# Patient Record
Sex: MALE | Race: WHITE | NOT HISPANIC OR LATINO | Employment: PART TIME | URBAN - METROPOLITAN AREA
[De-identification: names, ages, dates, MRNs, and addresses within clinical notes are randomized per-mention and may not be internally consistent; named-entity substitution may affect disease eponyms.]

---

## 2017-04-24 ENCOUNTER — TRANSCRIBE ORDERS (OUTPATIENT)
Dept: ADMINISTRATIVE | Facility: HOSPITAL | Age: 57
End: 2017-04-24

## 2017-04-24 ENCOUNTER — APPOINTMENT (OUTPATIENT)
Dept: LAB | Facility: HOSPITAL | Age: 57
End: 2017-04-24
Attending: FAMILY MEDICINE
Payer: COMMERCIAL

## 2017-04-24 DIAGNOSIS — F41.9 ANXIETY HYPERVENTILATION: Primary | ICD-10-CM

## 2017-04-24 DIAGNOSIS — F45.8 ANXIETY HYPERVENTILATION: ICD-10-CM

## 2017-04-24 DIAGNOSIS — I25.10 ATHEROSCLEROSIS OF NATIVE CORONARY ARTERY WITHOUT ANGINA PECTORIS, UNSPECIFIED WHETHER NATIVE OR TRANSPLANTED HEART: ICD-10-CM

## 2017-04-24 DIAGNOSIS — F41.9 ANXIETY HYPERVENTILATION: ICD-10-CM

## 2017-04-24 DIAGNOSIS — F45.8 ANXIETY HYPERVENTILATION: Primary | ICD-10-CM

## 2017-04-24 LAB
ALBUMIN SERPL BCP-MCNC: 3.8 G/DL (ref 3.5–5)
ALP SERPL-CCNC: 67 U/L (ref 46–116)
ALT SERPL W P-5'-P-CCNC: 27 U/L (ref 12–78)
ANION GAP SERPL CALCULATED.3IONS-SCNC: 6 MMOL/L (ref 4–13)
AST SERPL W P-5'-P-CCNC: 17 U/L (ref 5–45)
BILIRUB SERPL-MCNC: 0.5 MG/DL (ref 0.2–1)
BUN SERPL-MCNC: 16 MG/DL (ref 5–25)
CALCIUM SERPL-MCNC: 9 MG/DL (ref 8.3–10.1)
CHLORIDE SERPL-SCNC: 106 MMOL/L (ref 100–108)
CHOLEST SERPL-MCNC: 167 MG/DL (ref 50–200)
CO2 SERPL-SCNC: 29 MMOL/L (ref 21–32)
CREAT SERPL-MCNC: 1.01 MG/DL (ref 0.6–1.3)
EST. AVERAGE GLUCOSE BLD GHB EST-MCNC: 111 MG/DL
GFR SERPL CREATININE-BSD FRML MDRD: >60 ML/MIN/1.73SQ M
GLUCOSE P FAST SERPL-MCNC: 98 MG/DL (ref 65–99)
HBA1C MFR BLD: 5.5 % (ref 4.2–6.3)
HDLC SERPL-MCNC: 41 MG/DL (ref 40–60)
LDLC SERPL CALC-MCNC: 104 MG/DL (ref 0–100)
POTASSIUM SERPL-SCNC: 4 MMOL/L (ref 3.5–5.3)
PROT SERPL-MCNC: 6.8 G/DL (ref 6.4–8.2)
SODIUM SERPL-SCNC: 141 MMOL/L (ref 136–145)
TRIGL SERPL-MCNC: 109 MG/DL

## 2017-04-24 PROCEDURE — 80053 COMPREHEN METABOLIC PANEL: CPT

## 2017-04-24 PROCEDURE — 36415 COLL VENOUS BLD VENIPUNCTURE: CPT

## 2017-04-24 PROCEDURE — 80061 LIPID PANEL: CPT

## 2017-04-24 PROCEDURE — 83036 HEMOGLOBIN GLYCOSYLATED A1C: CPT

## 2017-04-25 ENCOUNTER — GENERIC CONVERSION - ENCOUNTER (OUTPATIENT)
Dept: OTHER | Facility: OTHER | Age: 57
End: 2017-04-25

## 2017-05-24 ENCOUNTER — ALLSCRIPTS OFFICE VISIT (OUTPATIENT)
Dept: OTHER | Facility: OTHER | Age: 57
End: 2017-05-24

## 2017-06-02 ENCOUNTER — TRANSCRIBE ORDERS (OUTPATIENT)
Dept: LAB | Facility: CLINIC | Age: 57
End: 2017-06-02

## 2017-06-02 ENCOUNTER — APPOINTMENT (OUTPATIENT)
Dept: LAB | Facility: CLINIC | Age: 57
End: 2017-06-02
Payer: COMMERCIAL

## 2017-06-02 DIAGNOSIS — R06.02 SHORTNESS OF BREATH: ICD-10-CM

## 2017-06-02 LAB
BASOPHILS # BLD AUTO: 0.05 THOUSANDS/ΜL (ref 0–0.1)
BASOPHILS NFR BLD AUTO: 1 % (ref 0–1)
EOSINOPHIL # BLD AUTO: 0.4 THOUSAND/ΜL (ref 0–0.61)
EOSINOPHIL NFR BLD AUTO: 6 % (ref 0–6)
ERYTHROCYTE [DISTWIDTH] IN BLOOD BY AUTOMATED COUNT: 13.2 % (ref 11.6–15.1)
HCT VFR BLD AUTO: 43.6 % (ref 36.5–49.3)
HGB BLD-MCNC: 14.9 G/DL (ref 12–17)
LYMPHOCYTES # BLD AUTO: 1.86 THOUSANDS/ΜL (ref 0.6–4.47)
LYMPHOCYTES NFR BLD AUTO: 26 % (ref 14–44)
MCH RBC QN AUTO: 31.4 PG (ref 26.8–34.3)
MCHC RBC AUTO-ENTMCNC: 34.2 G/DL (ref 31.4–37.4)
MCV RBC AUTO: 92 FL (ref 82–98)
MONOCYTES # BLD AUTO: 0.64 THOUSAND/ΜL (ref 0.17–1.22)
MONOCYTES NFR BLD AUTO: 9 % (ref 4–12)
NEUTROPHILS # BLD AUTO: 4.22 THOUSANDS/ΜL (ref 1.85–7.62)
NEUTS SEG NFR BLD AUTO: 58 % (ref 43–75)
NRBC BLD AUTO-RTO: 0 /100 WBCS
PLATELET # BLD AUTO: 241 THOUSANDS/UL (ref 149–390)
PMV BLD AUTO: 10.1 FL (ref 8.9–12.7)
RBC # BLD AUTO: 4.74 MILLION/UL (ref 3.88–5.62)
WBC # BLD AUTO: 7.2 THOUSAND/UL (ref 4.31–10.16)

## 2017-06-02 PROCEDURE — 36415 COLL VENOUS BLD VENIPUNCTURE: CPT

## 2017-06-02 PROCEDURE — 85025 COMPLETE CBC W/AUTO DIFF WBC: CPT

## 2017-06-04 ENCOUNTER — TELEPHONE (OUTPATIENT)
Dept: SURGERY | Facility: HOSPITAL | Age: 57
End: 2017-06-04

## 2017-06-05 ENCOUNTER — HOSPITAL ENCOUNTER (OUTPATIENT)
Dept: NON INVASIVE DIAGNOSTICS | Facility: HOSPITAL | Age: 57
Discharge: HOME | End: 2017-06-05
Attending: INTERNAL MEDICINE | Admitting: INTERNAL MEDICINE
Payer: COMMERCIAL

## 2017-06-05 VITALS
TEMPERATURE: 98.1 F | DIASTOLIC BLOOD PRESSURE: 81 MMHG | SYSTOLIC BLOOD PRESSURE: 132 MMHG | HEIGHT: 72 IN | HEART RATE: 74 BPM | WEIGHT: 284.39 LBS | RESPIRATION RATE: 18 BRPM | BODY MASS INDEX: 38.52 KG/M2 | OXYGEN SATURATION: 96 %

## 2017-06-05 DIAGNOSIS — I25.10 ARTERIOSCLEROTIC HEART DISEASE: ICD-10-CM

## 2017-06-05 LAB
ANION GAP SERPL CALCULATED.3IONS-SCNC: 5 MMOL/L (ref 4–13)
BUN SERPL-MCNC: 13 MG/DL (ref 5–25)
CALCIUM SERPL-MCNC: 8.7 MG/DL (ref 8.3–10.1)
CHLORIDE SERPL-SCNC: 107 MMOL/L (ref 100–108)
CO2 SERPL-SCNC: 30 MMOL/L (ref 21–32)
CREAT SERPL-MCNC: 1.09 MG/DL (ref 0.6–1.3)
ERYTHROCYTE [DISTWIDTH] IN BLOOD BY AUTOMATED COUNT: 13.4 % (ref 11.6–15.1)
GFR SERPL CREATININE-BSD FRML MDRD: >60 ML/MIN/1.73SQ M
GLUCOSE P FAST SERPL-MCNC: 100 MG/DL (ref 65–99)
GLUCOSE SERPL-MCNC: 100 MG/DL (ref 65–140)
HCT VFR BLD AUTO: 40.1 % (ref 36.5–49.3)
HGB BLD-MCNC: 14 G/DL (ref 12–17)
INR PPP: 1.06 (ref 0.86–1.16)
MCH RBC QN AUTO: 32.2 PG (ref 26.8–34.3)
MCHC RBC AUTO-ENTMCNC: 34.9 G/DL (ref 31.4–37.4)
MCV RBC AUTO: 92 FL (ref 82–98)
PLATELET # BLD AUTO: 202 THOUSANDS/UL (ref 149–390)
PMV BLD AUTO: 9.8 FL (ref 8.9–12.7)
POTASSIUM SERPL-SCNC: 4.1 MMOL/L (ref 3.5–5.3)
PROTHROMBIN TIME: 13.8 SECONDS (ref 12.1–14.4)
RBC # BLD AUTO: 4.35 MILLION/UL (ref 3.88–5.62)
SODIUM SERPL-SCNC: 142 MMOL/L (ref 136–145)
WBC # BLD AUTO: 5.51 THOUSAND/UL (ref 4.31–10.16)

## 2017-06-05 PROCEDURE — 85027 COMPLETE CBC AUTOMATED: CPT | Performed by: INTERNAL MEDICINE

## 2017-06-05 PROCEDURE — 93458 L HRT ARTERY/VENTRICLE ANGIO: CPT | Performed by: INTERNAL MEDICINE

## 2017-06-05 PROCEDURE — 85610 PROTHROMBIN TIME: CPT | Performed by: INTERNAL MEDICINE

## 2017-06-05 PROCEDURE — C1769 GUIDE WIRE: HCPCS | Performed by: INTERNAL MEDICINE

## 2017-06-05 PROCEDURE — 99152 MOD SED SAME PHYS/QHP 5/>YRS: CPT | Performed by: INTERNAL MEDICINE

## 2017-06-05 PROCEDURE — C1894 INTRO/SHEATH, NON-LASER: HCPCS | Performed by: INTERNAL MEDICINE

## 2017-06-05 PROCEDURE — 80048 BASIC METABOLIC PNL TOTAL CA: CPT | Performed by: INTERNAL MEDICINE

## 2017-06-05 PROCEDURE — 99153 MOD SED SAME PHYS/QHP EA: CPT | Performed by: INTERNAL MEDICINE

## 2017-06-05 RX ORDER — SODIUM CHLORIDE 9 MG/ML
100 INJECTION, SOLUTION INTRAVENOUS CONTINUOUS
Status: DISCONTINUED | OUTPATIENT
Start: 2017-06-05 | End: 2017-06-05

## 2017-06-05 RX ORDER — VERAPAMIL HYDROCHLORIDE 2.5 MG/ML
INJECTION, SOLUTION INTRAVENOUS CODE/TRAUMA/SEDATION MEDICATION
Status: COMPLETED | OUTPATIENT
Start: 2017-06-05 | End: 2017-06-05

## 2017-06-05 RX ORDER — ROSUVASTATIN CALCIUM 20 MG/1
20 TABLET, COATED ORAL
Qty: 30 TABLET | Refills: 0 | Status: SHIPPED | OUTPATIENT
Start: 2017-06-05 | End: 2018-04-12 | Stop reason: SDUPTHER

## 2017-06-05 RX ORDER — FENTANYL CITRATE 50 UG/ML
INJECTION, SOLUTION INTRAMUSCULAR; INTRAVENOUS CODE/TRAUMA/SEDATION MEDICATION
Status: COMPLETED | OUTPATIENT
Start: 2017-06-05 | End: 2017-06-05

## 2017-06-05 RX ORDER — MIDAZOLAM HYDROCHLORIDE 1 MG/ML
INJECTION INTRAMUSCULAR; INTRAVENOUS CODE/TRAUMA/SEDATION MEDICATION
Status: COMPLETED | OUTPATIENT
Start: 2017-06-05 | End: 2017-06-05

## 2017-06-05 RX ORDER — SODIUM CHLORIDE 9 MG/ML
125 INJECTION, SOLUTION INTRAVENOUS CONTINUOUS
Status: DISCONTINUED | OUTPATIENT
Start: 2017-06-05 | End: 2017-06-05 | Stop reason: HOSPADM

## 2017-06-05 RX ORDER — ROSUVASTATIN CALCIUM 10 MG/1
20 TABLET, COATED ORAL
Status: DISCONTINUED | OUTPATIENT
Start: 2017-06-05 | End: 2017-06-05 | Stop reason: HOSPADM

## 2017-06-05 RX ORDER — HEPARIN SODIUM 1000 [USP'U]/ML
INJECTION, SOLUTION INTRAVENOUS; SUBCUTANEOUS CODE/TRAUMA/SEDATION MEDICATION
Status: COMPLETED | OUTPATIENT
Start: 2017-06-05 | End: 2017-06-05

## 2017-06-05 RX ORDER — NITROGLYCERIN 20 MG/100ML
INJECTION INTRAVENOUS CODE/TRAUMA/SEDATION MEDICATION
Status: COMPLETED | OUTPATIENT
Start: 2017-06-05 | End: 2017-06-05

## 2017-06-05 RX ORDER — ASPIRIN 81 MG/1
TABLET, CHEWABLE ORAL CODE/TRAUMA/SEDATION MEDICATION
Status: COMPLETED | OUTPATIENT
Start: 2017-06-05 | End: 2017-06-05

## 2017-06-05 RX ORDER — CLOPIDOGREL BISULFATE 75 MG/1
TABLET ORAL CODE/TRAUMA/SEDATION MEDICATION
Status: COMPLETED | OUTPATIENT
Start: 2017-06-05 | End: 2017-06-05

## 2017-06-05 RX ADMIN — SODIUM CHLORIDE 100 ML/HR: 0.9 INJECTION, SOLUTION INTRAVENOUS at 07:59

## 2017-06-05 RX ADMIN — FENTANYL CITRATE 50 MCG: 50 INJECTION, SOLUTION INTRAMUSCULAR; INTRAVENOUS at 08:53

## 2017-06-05 RX ADMIN — CLOPIDOGREL 75 MG: 75 TABLET, FILM COATED ORAL at 07:53

## 2017-06-05 RX ADMIN — FENTANYL CITRATE 50 MCG: 50 INJECTION, SOLUTION INTRAMUSCULAR; INTRAVENOUS at 08:46

## 2017-06-05 RX ADMIN — MIDAZOLAM HYDROCHLORIDE 2 MG: 1 INJECTION, SOLUTION INTRAMUSCULAR; INTRAVENOUS at 08:46

## 2017-06-05 RX ADMIN — IOHEXOL 60 ML: 350 INJECTION, SOLUTION INTRAVENOUS at 09:07

## 2017-06-05 RX ADMIN — FENTANYL CITRATE 50 MCG: 50 INJECTION, SOLUTION INTRAMUSCULAR; INTRAVENOUS at 08:49

## 2017-06-05 RX ADMIN — MIDAZOLAM HYDROCHLORIDE 1 MG: 1 INJECTION, SOLUTION INTRAMUSCULAR; INTRAVENOUS at 08:49

## 2017-06-05 RX ADMIN — ASPIRIN 81 MG: 81 TABLET, CHEWABLE ORAL at 07:53

## 2017-06-05 RX ADMIN — HEPARIN SODIUM 4000 UNITS: 1000 INJECTION INTRAVENOUS; SUBCUTANEOUS at 08:50

## 2017-06-05 RX ADMIN — NITROGLYCERIN 200 MCG: 20 INJECTION INTRAVENOUS at 08:50

## 2017-06-05 RX ADMIN — VERAPAMIL HYDROCHLORIDE 2.5 MG: 2.5 INJECTION, SOLUTION INTRAVENOUS at 08:51

## 2017-06-05 RX ADMIN — MIDAZOLAM HYDROCHLORIDE 1 MG: 1 INJECTION, SOLUTION INTRAMUSCULAR; INTRAVENOUS at 08:54

## 2017-09-07 ENCOUNTER — APPOINTMENT (OUTPATIENT)
Dept: LAB | Facility: CLINIC | Age: 57
End: 2017-09-07
Payer: COMMERCIAL

## 2017-09-07 ENCOUNTER — ALLSCRIPTS OFFICE VISIT (OUTPATIENT)
Dept: OTHER | Facility: OTHER | Age: 57
End: 2017-09-07

## 2017-09-07 DIAGNOSIS — M10.9 GOUT: ICD-10-CM

## 2017-09-07 DIAGNOSIS — M19.90 OSTEOARTHRITIS: ICD-10-CM

## 2017-09-07 LAB — ERYTHROCYTE [SEDIMENTATION RATE] IN BLOOD: 26 MM/HOUR (ref 0–10)

## 2017-09-07 PROCEDURE — 86200 CCP ANTIBODY: CPT

## 2017-09-07 PROCEDURE — 86618 LYME DISEASE ANTIBODY: CPT

## 2017-09-07 PROCEDURE — 86430 RHEUMATOID FACTOR TEST QUAL: CPT

## 2017-09-07 PROCEDURE — 85652 RBC SED RATE AUTOMATED: CPT

## 2017-09-07 PROCEDURE — 36415 COLL VENOUS BLD VENIPUNCTURE: CPT

## 2017-09-08 ENCOUNTER — GENERIC CONVERSION - ENCOUNTER (OUTPATIENT)
Dept: OTHER | Facility: OTHER | Age: 57
End: 2017-09-08

## 2017-09-08 LAB
B BURGDOR IGG SER IA-ACNC: 0.25
B BURGDOR IGM SER IA-ACNC: 0.34
RHEUMATOID FACT SER QL LA: NEGATIVE

## 2017-09-09 LAB — CCP IGA+IGG SERPL IA-ACNC: 3 UNITS (ref 0–19)

## 2017-09-11 ENCOUNTER — GENERIC CONVERSION - ENCOUNTER (OUTPATIENT)
Dept: OTHER | Facility: OTHER | Age: 57
End: 2017-09-11

## 2017-09-18 ENCOUNTER — GENERIC CONVERSION - ENCOUNTER (OUTPATIENT)
Dept: OTHER | Facility: OTHER | Age: 57
End: 2017-09-18

## 2017-11-11 ENCOUNTER — ALLSCRIPTS OFFICE VISIT (OUTPATIENT)
Dept: OTHER | Facility: OTHER | Age: 57
End: 2017-11-11

## 2017-11-11 LAB — S PYO AG THROAT QL: NEGATIVE

## 2017-11-13 ENCOUNTER — HOSPITAL ENCOUNTER (EMERGENCY)
Facility: HOSPITAL | Age: 57
Discharge: HOME | End: 2017-11-13
Admitting: EMERGENCY MEDICINE
Payer: COMMERCIAL

## 2017-11-13 ENCOUNTER — APPOINTMENT (EMERGENCY)
Dept: CT IMAGING | Facility: HOSPITAL | Age: 57
End: 2017-11-13
Payer: COMMERCIAL

## 2017-11-13 VITALS
HEART RATE: 68 BPM | SYSTOLIC BLOOD PRESSURE: 132 MMHG | TEMPERATURE: 97.7 F | DIASTOLIC BLOOD PRESSURE: 80 MMHG | RESPIRATION RATE: 18 BRPM | OXYGEN SATURATION: 97 %

## 2017-11-13 DIAGNOSIS — J03.90 TONSILLITIS: Primary | ICD-10-CM

## 2017-11-13 LAB
ALBUMIN SERPL BCP-MCNC: 3.9 G/DL (ref 3.5–5)
ALP SERPL-CCNC: 85 U/L (ref 46–116)
ALT SERPL W P-5'-P-CCNC: 37 U/L (ref 12–78)
ANION GAP SERPL CALCULATED.3IONS-SCNC: 8 MMOL/L (ref 4–13)
APTT PPP: 33 SECONDS (ref 23–35)
AST SERPL W P-5'-P-CCNC: 18 U/L (ref 5–45)
BASOPHILS # BLD AUTO: 0.02 THOUSANDS/ΜL (ref 0–0.1)
BASOPHILS NFR BLD AUTO: 0 % (ref 0–1)
BILIRUB SERPL-MCNC: 0.5 MG/DL (ref 0.2–1)
BUN SERPL-MCNC: 15 MG/DL (ref 5–25)
CALCIUM SERPL-MCNC: 9.1 MG/DL (ref 8.3–10.1)
CHLORIDE SERPL-SCNC: 103 MMOL/L (ref 100–108)
CO2 SERPL-SCNC: 30 MMOL/L (ref 21–32)
CREAT SERPL-MCNC: 1.23 MG/DL (ref 0.6–1.3)
EOSINOPHIL # BLD AUTO: 0.27 THOUSAND/ΜL (ref 0–0.61)
EOSINOPHIL NFR BLD AUTO: 5 % (ref 0–6)
ERYTHROCYTE [DISTWIDTH] IN BLOOD BY AUTOMATED COUNT: 14.7 % (ref 11.6–15.1)
GFR SERPL CREATININE-BSD FRML MDRD: 66 ML/MIN/1.73SQ M
GLUCOSE SERPL-MCNC: 104 MG/DL (ref 65–140)
HCT VFR BLD AUTO: 42.8 % (ref 36.5–49.3)
HGB BLD-MCNC: 14.2 G/DL (ref 12–17)
INR PPP: 1 (ref 0.86–1.16)
LACTATE SERPL-SCNC: 0.8 MMOL/L (ref 0.5–2)
LYMPHOCYTES # BLD AUTO: 1.37 THOUSANDS/ΜL (ref 0.6–4.47)
LYMPHOCYTES NFR BLD AUTO: 26 % (ref 14–44)
MCH RBC QN AUTO: 29.6 PG (ref 26.8–34.3)
MCHC RBC AUTO-ENTMCNC: 33.2 G/DL (ref 31.4–37.4)
MCV RBC AUTO: 89 FL (ref 82–98)
MONOCYTES # BLD AUTO: 0.5 THOUSAND/ΜL (ref 0.17–1.22)
MONOCYTES NFR BLD AUTO: 10 % (ref 4–12)
NEUTROPHILS # BLD AUTO: 3.11 THOUSANDS/ΜL (ref 1.85–7.62)
NEUTS SEG NFR BLD AUTO: 59 % (ref 43–75)
PLATELET # BLD AUTO: 150 THOUSANDS/UL (ref 149–390)
PMV BLD AUTO: 9.6 FL (ref 8.9–12.7)
POTASSIUM SERPL-SCNC: 4 MMOL/L (ref 3.5–5.3)
PROT SERPL-MCNC: 7.4 G/DL (ref 6.4–8.2)
PROTHROMBIN TIME: 13.5 SECONDS (ref 12.1–14.4)
RBC # BLD AUTO: 4.79 MILLION/UL (ref 3.88–5.62)
SODIUM SERPL-SCNC: 141 MMOL/L (ref 136–145)
WBC # BLD AUTO: 5.27 THOUSAND/UL (ref 4.31–10.16)

## 2017-11-13 PROCEDURE — 85730 THROMBOPLASTIN TIME PARTIAL: CPT | Performed by: PHYSICIAN ASSISTANT

## 2017-11-13 PROCEDURE — 96361 HYDRATE IV INFUSION ADD-ON: CPT

## 2017-11-13 PROCEDURE — 70491 CT SOFT TISSUE NECK W/DYE: CPT

## 2017-11-13 PROCEDURE — 99284 EMERGENCY DEPT VISIT MOD MDM: CPT

## 2017-11-13 PROCEDURE — 96374 THER/PROPH/DIAG INJ IV PUSH: CPT

## 2017-11-13 PROCEDURE — 80053 COMPREHEN METABOLIC PANEL: CPT | Performed by: PHYSICIAN ASSISTANT

## 2017-11-13 PROCEDURE — 36415 COLL VENOUS BLD VENIPUNCTURE: CPT | Performed by: PHYSICIAN ASSISTANT

## 2017-11-13 PROCEDURE — 87040 BLOOD CULTURE FOR BACTERIA: CPT | Performed by: PHYSICIAN ASSISTANT

## 2017-11-13 PROCEDURE — 83605 ASSAY OF LACTIC ACID: CPT | Performed by: PHYSICIAN ASSISTANT

## 2017-11-13 PROCEDURE — 85025 COMPLETE CBC W/AUTO DIFF WBC: CPT | Performed by: PHYSICIAN ASSISTANT

## 2017-11-13 PROCEDURE — 85610 PROTHROMBIN TIME: CPT | Performed by: PHYSICIAN ASSISTANT

## 2017-11-13 RX ORDER — HYDROCODONE BITARTRATE AND ACETAMINOPHEN 5; 325 MG/1; MG/1
1 TABLET ORAL EVERY 6 HOURS PRN
Qty: 6 TABLET | Refills: 0 | Status: SHIPPED | OUTPATIENT
Start: 2017-11-13 | End: 2018-02-08

## 2017-11-13 RX ORDER — KETOROLAC TROMETHAMINE 30 MG/ML
15 INJECTION, SOLUTION INTRAMUSCULAR; INTRAVENOUS ONCE
Status: COMPLETED | OUTPATIENT
Start: 2017-11-13 | End: 2017-11-13

## 2017-11-13 RX ORDER — NAPROXEN 500 MG/1
500 TABLET ORAL 2 TIMES DAILY WITH MEALS
Qty: 10 TABLET | Refills: 0 | Status: SHIPPED | OUTPATIENT
Start: 2017-11-13 | End: 2018-02-08

## 2017-11-13 RX ADMIN — KETOROLAC TROMETHAMINE 15 MG: 30 INJECTION, SOLUTION INTRAMUSCULAR at 11:43

## 2017-11-13 RX ADMIN — SODIUM CHLORIDE 1000 ML: 0.9 INJECTION, SOLUTION INTRAVENOUS at 09:35

## 2017-11-13 RX ADMIN — IOHEXOL 85 ML: 350 INJECTION, SOLUTION INTRAVENOUS at 10:03

## 2017-11-13 NOTE — PROGRESS NOTES
Assessment    1  Acute pharyngitis (462) (J02 9)   2  BMI 37 0-37 9, adult (V85 37) (Z68 37)    Plan  Acute pharyngitis    · Azithromycin 250 MG Oral Tablet; TAKE 2 TABLETS ON DAY 1 THEN TAKE 1TABLET A DAY FOR 4 DAYS   · Follow Up if Not Better Evaluation and Treatment  Follow-up  Status: Complete  Done:11Nov2017   · Drink plenty of fluids ; Status:Complete;   Done: 56WUO9514   · Gargle with warm salt water for 5 minutes every 4 hours ; Status:Complete;   Done:11Nov2017   · Irrigate your nose twice a day ; Status:Complete;   Done: 64YOY3984   · Rapid StrepA- POC; Source:Throat; Status:Complete;   Done: 40LPC0144 09:28AM  BMI 37 0-37 9, adult    · Begin a limited exercise program ; Status:Complete;   Done: 99ZEC0606    Discussion/Summary    Strep negative  Salt water gargles, hot tea with honey and lemon, Ibuprofen as needed for discomfort  Take antibiotics as instructed  Follow up as needed for persistent or worsening symptoms  Possible side effects of new medications were reviewed with the patient/guardian today  The treatment plan was reviewed with the patient/guardian  The patient/guardian understands and agrees with the treatment plan      Chief Complaint  pt c/o sore throat  pt states most of the pain is at the back of his tongue  ac/cma      History of Present Illness  HPI: He had pain in the back of his tongue a few days ago and now his throat hurts  He has white spots in his throat  His has pressure and itching in his ears  Denies fevers, sinus congestion, cough, abdominal discomfort  Notes intermittent, mild headache  He took Advil which does help temporarily  Review of Systems   Constitutional: no fever-- and-- no chills  ENT: sore throat, but-- as noted in HPI  Cardiovascular: no chest pain  Respiratory: no shortness of breath,-- no cough-- and-- no wheezing  Gastrointestinal: no abdominal pain,-- no nausea,-- no vomiting-- and-- no diarrhea    Musculoskeletal: no arthralgias-- and-- no myalgias  Neurological: headache  Active Problems  1  Abdominal bruit (785 9) (R09 89)   2  Abdominal pain (789 00) (R10 9)   3  Abnormal CT scan, esophagus (793 4) (R93 3)   4  Abnormal CT scan, sigmoid colon (793 4) (R93 3)   5  Abnormal CT scan, stomach (793 4) (R93 3)   6  Anxiety (300 00) (F41 9)   7  Arteriosclerotic heart disease (414 00) (I25 10)   8  Arthritis (716 90) (M19 90)   9  BMI 37 0-37 9, adult (V85 37) (Z68 37)   10  Encounter for screening colonoscopy (V76 51) (Z12 11)   11  Erectile dysfunction (607 84) (N52 9)   12  Exertional shortness of breath (786 05) (R06 02)   13  Gout (274 9) (M10 9)   14  Major depression (296 20) (F32 9)   15  Obesity, Class II, BMI 35-39 9 (278 00) (E66 9)   16  Poor dentition (525 9) (K08 9)   17  Pruritus (698 9) (L29 9)   18  Right upper quadrant rebound abdominal tenderness (789 61) (R10 821)   19  S/P drug eluting coronary stent placement (V45 82) (Z95 5)   20  S/P right coronary artery (RCA) stent placement (V45 82) (Z95 5)   21  STEMI (ST elevation myocardial infarction) (410 90) (I21 3)   22  Well adult on routine health check (V70 0) (Z00 00)    Past Medical History    1  History of _ (781 1)   2  History of _ (272 1)   3  History of CAD (coronary artery disease) (414 00) (I25 10)   4  History of Cough (786 2) (R05)   5  History of Dermoid cyst of face (216 3) (D23 30)   6  History of acute bronchitis (V12 69) (Z87 09)   7  History of acute bronchitis (V12 69) (Z87 09)   8  History of fatigue (V13 89) (Z87 898)   9  History of heart attack (412) (I25 2)   10  History of Hospital discharge follow-up (V67 59) (Z09)   11  History of Otitis externa, right   12  History of Otitis media, left (382 9) (H66 92)   13  History of Presenile dementia, depressed type (290 13) (F03 90)   14  History of Puncture wound of foot, left (892 0) (S91 332A)   15  History of Tired (780 79) (R53 83)   16  History of Tooth pain (525 9) (K08 89)   17   History of Upper back pain on left side (724 5) (M54 9)  Active Problems And Past Medical History Reviewed: The active problems and past medical history were reviewed and updated today  Family History  Mother    1  Denied: Family history of Colon cancer   2  Denied: Family history of Crohn's disease without complication, unspecified gastrointestinal tract location   3  Family history of arthritis (V17 7) (Z82 61)   4  Family history of hypertension (V17 49) (Z82 49)   5  Denied: Family history of liver disease  Father    10  Family history of CAD (coronary artery disease)   7  Denied: Family history of Colon cancer   8  Denied: Family history of Crohn's disease without complication, unspecified gastrointestinal tract location   9  Family history of hypertension (V17 49) (Z82 49)   10  Denied: Family history of liver disease    Social History   · Alcohol use (V49 89) (Z78 9)   · Former smoker (V15 82) (R71 046)   · Lack of exercise (V69 0) (Z72 3)   ·    · Pets/Animals: Dog   · Sleeps 8 - 10 hours a day   · Uses marijuana (305 20) (F12 90)  The social history was reviewed and is unchanged  Surgical History    1  History of Appendectomy   2  History of Colonoscopy (Fiberoptic) Screening    Current Meds   1  Aspir-81 81 MG Oral Tablet Delayed Release; 1 every day Recorded   2  BuPROPion HCl ER (XL) 300 MG Oral Tablet Extended Release 24 Hour; TAKE 1 TABLET DAILY; Therapy: 46Wyd5459 to (Evaluate:48Uqc0491)  Requested for: 21Jun2017; Last OL:63ZJI8156 Ordered   3  Colchicine 0 6 MG Oral Tablet; TAKE TWO TABLETS BY MOUTH FOR 1 DOSE, THEN TAKE 1 TABLET 1 HOUR LATERAS DIRECTED; Therapy: 07Sep2017 to (Evaluate:05Oct2017)  Requested for: 07Sep2017; Last Rx:83Qht8871 Ordered   4  Ketorolac Tromethamine 10 MG Oral Tablet; TAKE 1 TABLET 3 TIMES DAILY AS NEEDED FOR PAIN; Therapy: 07Sep2017 to (Evaluate:10Sep2017)  Requested for: 07Sep2017; Last WZ:71TKZ9013 Ordered   5   Levitra 20 MG Oral Tablet; one pill 1 hr prior to intercourse; Therapy: 77Cod0255 to (Last Rx:37Ryi4488)  Requested for: 95Kdi1715 Ordered   6  Multivitamins TABS; TAKE 1 TABLET DAILY; Therapy: (Recorded:10May2016) to Recorded   7  Nitrostat 0 4 MG Sublingual Tablet Sublingual; TAKE AS DIRECTED; Therapy: (Recorded:10May2016) to Recorded   8  Rosuvastatin Calcium 20 MG Oral Tablet; Take 1 tablet daily; Therapy: 28EAY6617 to (Evaluate:66Ybx2690)  Requested for: 10Aug2017; Last Rx:10Aug2017 Ordered    The medication list was reviewed and updated today  Allergies  1  AMOXICILLIN    Vitals   Recorded: 42CHA5668 09:13AM   Temperature 95 F   Heart Rate 82   Respiration 16   Systolic 649   Diastolic 82   Height 6 ft    Weight 273 lb    BMI Calculated 37 03   BSA Calculated 2 43       Physical Exam   Constitutional  General appearance: No acute distress, well appearing and well nourished  Eyes  Conjunctiva and lids: No swelling, erythema, or discharge  Ears, Nose, Mouth, and Throat  Otoscopic examination: Tympanic membrance translucent with normal light reflex  Canals patent without erythema  Nasal mucosa, septum, and turbinates: Normal without edema or erythema  Oropharynx: Abnormal   The posterior pharynx was erythematous  There was erythema of the right tonsil exudate  Pulmonary  Respiratory effort: No increased work of breathing or signs of respiratory distress  Auscultation of lungs: Clear to auscultation, equal breath sounds bilaterally, no wheezes, no rales, no rhonci  Cardiovascular  Auscultation of heart: Normal rate and rhythm, normal S1 and S2, without murmurs  Examination of extremities for edema and/or varicosities: Normal    Lymphatic  Palpation of lymph nodes in neck: No lymphadenopathy     Psychiatric  Mood and affect: Normal          Results/Data  Rapid StrepA- POC 52NZX3270 09:28AM 7digital     Test Name Result Flag Reference   Rapid Strep Negative           Attending Note  Collaborating Physician Note: Collaborating Note: I agree with the Advanced Practitioner note        Signatures   Electronically signed by : Mikayla Arrington; Nov 11 2017  9:30AM EST                       (Author)    Electronically signed by : Ovidio Garcia DO; Nov 13 2017 12:14AM EST                       (Author)

## 2017-11-13 NOTE — DISCHARGE INSTRUCTIONS
Tonsillitis   WHAT YOU NEED TO KNOW:   Tonsillitis is inflammation of your tonsils  Tonsils are the lumps of tissue on both sides of the back of your throat  Tonsils are part of your immune system  They help you fight infections  Recurrent tonsillitis is when you have tonsillitis many times in 1 year  Chronic tonsillitis is when you have a sore throat that lasts 3 months or longer  DISCHARGE INSTRUCTIONS:   Medicines: You may need any of the following:  · Acetaminophen  decreases pain and fever  It is available without a doctor's order  Ask how much to take and how often to take it  Follow directions  Acetaminophen can cause liver damage if not taken correctly  · NSAIDs , such as ibuprofen, help decrease swelling, pain, and fever  This medicine is available with or without a doctor's order  NSAIDs can cause stomach bleeding or kidney problems in certain people  If you take blood thinner medicine, always ask your healthcare provider if NSAIDs are safe for you  Always read the medicine label and follow directions  · Antibiotics  help treat a bacterial infection  · Take your medicine as directed  Contact your healthcare provider if you think your medicine is not helping or if you have side effects  Tell him or her if you are allergic to any medicine  Keep a list of the medicines, vitamins, and herbs you take  Include the amounts, and when and why you take them  Bring the list or the pill bottles to follow-up visits  Carry your medicine list with you in case of an emergency  Call 911 for the following:   · You have trouble breathing because your tonsils are swollen  Contact your healthcare provider if:   · You have a fever  · Your pain gets worse or does not get better after you take pain medicine  · Your sore throat is not better after you have finished antibiotic treatment  · You have trouble sleeping and wake up trying to catch your breath      · You have questions or concerns about your condition or care  Rest  when you feel it is needed  Slowly start to do more each day  Return to your daily activities as directed  Drink liquids as directed: You may need to drink more liquid than usual to help prevent dehydration  Ask how much liquid to drink each day and which liquids are best for you  Gargle with warm salt water: This may help decrease throat pain  Mix 1 teaspoon of salt in 8 ounces of warm water  Ask how often you should do this  Prevent the spread of germs:  Wash your hands often  Do not share food or drinks with anyone  You may be able to return to work when you feel better and your fever is gone for at least 24 hours  Follow up with your healthcare provider as directed:  Write down your questions so you remember to ask them during your visits  © 2017 [employer]  Information is for End User's use only and may not be sold, redistributed or otherwise used for commercial purposes  All illustrations and images included in CareNotes® are the copyrighted property of [employer]  or [address]  The above information is an  only  It is not intended as medical advice for individual conditions or treatments  Talk to your doctor, nurse or pharmacist before following any medical regimen to see if it is safe and effective for you

## 2017-11-13 NOTE — ED NOTES
Pt states he only took 2doses of Guadlupe Clunes, he wasn't feeling any better so he stopped, he was looking for a quick fix and thought he should feel better already; his dr also told him the Guadlupe Clunes may not help him so he assumed it would not     Gaurav Montemayor RN  11/13/17 0162

## 2017-11-13 NOTE — ED PROVIDER NOTES
History  Chief Complaint   Patient presents with    Sore Throat     PT WAS SEEN ON SATURDAY AM FOR SORE THROAT  PT C/O MOUTH PAIN AND SORE THROAT, PAIN WITH SWALLOWING  PT WAS PRESCRIBED ZITHOMAX WITHOUT RELIEF  DENIES TROUBLE BREATHING       History provided by:  Patient  Sore Throat   Location:  Right  Quality:  Aching  Onset quality:  Gradual  Duration:  4 days  Timing:  Constant  Progression:  Worsening  Chronicity:  New  Relieved by:  Nothing  Worsened by:  Drinking, eating and swallowing  Ineffective treatments:  Acetaminophen  Associated symptoms: adenopathy    Associated symptoms: no abdominal pain, no chest pain, no chills, no cough, no drooling, no ear discharge, no ear pain, no epistaxis, no eye discharge, no fever, no headaches, no neck stiffness, no night sweats, no plugged ear sensation, no postnasal drip, no rash, no rhinorrhea, no shortness of breath, no sinus congestion, no stridor, no trouble swallowing and no voice change    Risk factors: no exposure to strep, no sick contacts, no recent dental procedure, no recent endoscopy and no recent ENT procedure        Prior to Admission Medications   Prescriptions Last Dose Informant Patient Reported?  Taking?   aspirin 81 mg chewable tablet  Spouse/Significant Other Yes No   Sig: Chew 81 mg daily   buPROPion (WELLBUTRIN XL) 300 mg 24 hr tablet   Yes No   Sig: Take 300 mg by mouth daily   clopidogrel (PLAVIX) 75 mg tablet  Spouse/Significant Other Yes No   Sig: Take 75 mg by mouth daily   rosuvastatin (CRESTOR) 20 MG tablet   No No   Sig: Take 1 tablet by mouth daily with dinner      Facility-Administered Medications: None       Past Medical History:   Diagnosis Date    Coronary artery disease     Depression     Hyperlipidemia     Myocardial infarction 04/15/2016    99% RCA block    Psychiatric disorder     depression       Past Surgical History:   Procedure Laterality Date    APPENDECTOMY      COLONOSCOPY N/A 12/14/2016    Procedure: COLONOSCOPY; Surgeon: Alfredo Sherman MD;  Location: [facility] GI LAB; Service:     CORONARY ANGIOPLASTY WITH STENT PLACEMENT Right 04/15/2016    ESOPHAGOGASTRODUODENOSCOPY N/A 12/14/2016    Procedure: ESOPHAGOGASTRODUODENOSCOPY (EGD); Surgeon: Alfredo Sherman MD;  Location: [facility] GI LAB; Service:        Family History   Problem Relation Age of Onset    Arthritis Mother     Hypertension Father     Heart disease Father     Hypertension Brother     Cancer Maternal Grandfather      I have reviewed and agree with the history as documented  Social History   Substance Use Topics    Smoking status: Former Smoker     Packs/day: 0 50     Years: 15 00     Types: Cigarettes     Quit date: 4/15/2016    Smokeless tobacco: Never Used    Alcohol use Yes      Comment: 2-3 beers every cpl days        Review of Systems   Constitutional: Positive for activity change, appetite change and fatigue  Negative for chills, diaphoresis, fever and night sweats  HENT: Positive for sore throat  Negative for congestion, drooling, ear discharge, ear pain, mouth sores, nosebleeds, postnasal drip, rhinorrhea, sinus pressure, trouble swallowing and voice change  Eyes: Negative for discharge, redness and itching  Respiratory: Negative for cough, chest tightness, shortness of breath and stridor  Cardiovascular: Negative for chest pain and palpitations  Gastrointestinal: Negative for abdominal pain, diarrhea, nausea and vomiting  Musculoskeletal: Negative for myalgias and neck stiffness  Skin: Negative for color change and rash  Neurological: Negative for headaches  Hematological: Positive for adenopathy  Psychiatric/Behavioral: Negative for confusion  All other systems reviewed and are negative        Physical Exam  ED Triage Vitals [11/13/17 0844]   Temperature Pulse Respirations Blood Pressure SpO2   97 7 °F (36 5 °C) 76 18 131/83 100 %      Temp Source Heart Rate Source Patient Position - Orthostatic VS BP Location FiO2 (%) Oral Monitor Sitting Right arm --      Pain Score       9           Orthostatic Vital Signs  Vitals:    11/13/17 0844 11/13/17 1122 11/13/17 1149   BP: 131/83 129/82 132/80   Pulse: 76 66 68   Patient Position - Orthostatic VS: Sitting Lying Sitting       Physical Exam   Constitutional: He is oriented to person, place, and time  He appears well-developed and well-nourished  No distress  HENT:   Head: Normocephalic  Right Ear: External ear normal    Left Ear: External ear normal    Nose: Nose normal    Positive hot potato voice, positive trismus  There is a erythematous area with exudates present on the right tonsil  The right tonsil is enlarged  I question a small abscess  There is mild uvular shift to the left  The airway is patent  Patient is able to handle his own secretions  Eyes: Pupils are equal, round, and reactive to light  Right eye exhibits no discharge  Left eye exhibits no discharge  Neck: Neck supple  Cardiovascular: Normal rate, regular rhythm and normal heart sounds  Exam reveals no friction rub  No murmur heard  Pulmonary/Chest: Effort normal and breath sounds normal  No respiratory distress  He has no wheezes  He has no rales  Abdominal: Soft  There is no tenderness  There is no rebound and no guarding  Musculoskeletal: He exhibits no edema  Lymphadenopathy:     He has cervical adenopathy  Neurological: He is alert and oriented to person, place, and time  Skin: Skin is warm  Capillary refill takes less than 2 seconds  He is not diaphoretic  Psychiatric: He has a normal mood and affect  His behavior is normal  Judgment and thought content normal    Nursing note and vitals reviewed        ED Medications  Medications   sodium chloride 0 9 % bolus 1,000 mL (0 mL Intravenous Stopped 11/13/17 1141)   iohexol (OMNIPAQUE) 350 MG/ML injection (MULTI-DOSE) 100 mL (85 mL Intravenous Given 11/13/17 1003)   ketorolac (TORADOL) 30 mg/mL injection 15 mg (15 mg Intravenous Given 11/13/17 1143)       Diagnostic Studies  Results Reviewed     Procedure Component Value Units Date/Time    Lactic acid, plasma [[id number]]  (Normal) Collected:  11/13/17 0918    Lab Status:  Final result Specimen:  Blood from Arm, Right Updated:  11/13/17 0958     LACTIC ACID 0 8 mmol/L     Narrative:         Result may be elevated if tourniquet was used during collection  Comprehensive metabolic panel [[id number]] Collected:  11/13/17 0918    Lab Status:  Final result Specimen:  Blood from Arm, Right Updated:  11/13/17 0947     Sodium 141 mmol/L      Potassium 4 0 mmol/L      Chloride 103 mmol/L      CO2 30 mmol/L      Anion Gap 8 mmol/L      BUN 15 mg/dL      Creatinine 1 23 mg/dL      Glucose 104 mg/dL      Calcium 9 1 mg/dL      AST 18 U/L      ALT 37 U/L      Alkaline Phosphatase 85 U/L      Total Protein 7 4 g/dL      Albumin 3 9 g/dL      Total Bilirubin 0 50 mg/dL      eGFR 66 ml/min/1 73sq m     Narrative:         National Kidney Disease Education Program recommendations are as follows:  GFR calculation is accurate only with a steady state creatinine  Chronic Kidney disease less than 60 ml/min/1 73 sq  meters  Kidney failure less than 15 ml/min/1 73 sq  meters  Neda Whittaker [[id number]]  (Normal) Collected:  11/13/17 0918    Lab Status:  Final result Specimen:  Blood from Arm, Right Updated:  11/13/17 0942     Protime 13 5 seconds      INR 1 00    APTT [[id number]]  (Normal) Collected:  11/13/17 0918    Lab Status:  Final result Specimen:  Blood from Arm, Right Updated:  11/13/17 0942     PTT 33 seconds     Narrative: Therapeutic Heparin Range = 60-90 seconds    Blood culture #1 [[id number]] Collected:  11/13/17 0938    Lab Status:   In process Specimen:  Blood from Arm, Left Updated:  11/13/17 0942    CBC and differential [[id number]]  (Normal) Collected:  11/13/17 0918    Lab Status:  Final result Specimen:  Blood from Arm, Right Updated:  11/13/17 0927     WBC 5 27 Thousand/uL      RBC 4 79 Million/uL Hemoglobin 14 2 g/dL      Hematocrit 42 8 %      MCV 89 fL      MCH 29 6 pg      MCHC 33 2 g/dL      RDW 14 7 %      MPV 9 6 fL      Platelets 296 Thousands/uL      Neutrophils Relative 59 %      Lymphocytes Relative 26 %      Monocytes Relative 10 %      Eosinophils Relative 5 %      Basophils Relative 0 %      Neutrophils Absolute 3 11 Thousands/µL      Lymphocytes Absolute 1 37 Thousands/µL      Monocytes Absolute 0 50 Thousand/µL      Eosinophils Absolute 0 27 Thousand/µL      Basophils Absolute 0 02 Thousands/µL     Blood culture #2 [[id number]] Collected:  11/13/17 0918    Lab Status: In process Specimen:  Blood from Arm, Right Updated:  11/13/17 0924                 CT soft tissue neck with contrast   ED Interpretation by Nadja Camarillo PA-C (11/13 1112)   Mild tonsillitis, pharyngitis and laryngitis  No evidence of peritonsillar abscess      Final Result by Junior Zavala DO (11/13 1024)   Mild tonsillitis, pharyngitis and laryngitis  No evidence of peritonsillar abscess  Workstation performed: [id number]                    Procedures  Procedures       Phone Contacts  ED Phone Contact    ED Course  ED Course                                MDM  Number of Diagnoses or Management Options  Tonsillitis: new and requires workup     Amount and/or Complexity of Data Reviewed  Clinical lab tests: ordered and reviewed  Tests in the radiology section of CPT®: ordered and reviewed  Tests in the medicine section of CPT®: ordered and reviewed    Risk of Complications, Morbidity, and/or Mortality  Presenting problems: high  Diagnostic procedures: high  Management options: high  General comments: Patient presented to the emergency room complaining of a sore throat  He had a PET hyper 2 voice and swell as trismus  Was imaged to rule out a peritonsillar abscess  There is no peritonsillar abscess identified  His labs were reviewed he was treated with Solu-Medrol  He had been taking Zithromax for 2 days  I have instructed him to continue to finish the Zithromax  He is to orally hydrate himself  He was follow up with his doctor for repeat evaluation in 2 days  If his symptoms worsen will see him back in the emergency room  Patient Progress  Patient progress: stable    CritCare Time    Disposition  Final diagnoses: Tonsillitis     Time reflects when diagnosis was documented in both MDM as applicable and the Disposition within this note     Time User Action Codes Description Comment    11/13/2017  5:32 PM Minor Neigh Add [J03 90] Tonsillitis       ED Disposition     ED Disposition Condition Comment    Discharge  Estefany Tovar II discharge to home/self care  Condition at discharge: Good        Follow-up Information     Follow up With Specialties Details Why [address], DO Family Medicine In 2 days  [facility]  [phone]          Discharge Medication List as of 11/13/2017 11:18 AM      START taking these medications    Details   HYDROcodone-acetaminophen (NORCO) 5-325 mg per tablet Take 1 tablet by mouth every 6 (six) hours as needed for pain for up to 6 doses Max Daily Amount: 4 tablets, Starting Mon 11/13/2017, Print      naproxen (NAPROSYN) 500 mg tablet Take 1 tablet by mouth 2 (two) times a day with meals for 10 days, Starting Mon 11/13/2017, Until Thu 11/23/2017, Normal         CONTINUE these medications which have NOT CHANGED    Details   aspirin 81 mg chewable tablet Chew 81 mg daily, Until Discontinued, Historical Med      buPROPion (WELLBUTRIN XL) 300 mg 24 hr tablet Take 300 mg by mouth daily, Until Discontinued, Historical Med      clopidogrel (PLAVIX) 75 mg tablet Take 75 mg by mouth daily, Until Discontinued, Historical Med      rosuvastatin (CRESTOR) 20 MG tablet Take 1 tablet by mouth daily with dinner, Starting 6/5/2017, Until Discontinued, Print           No discharge procedures on file      ED Provider  Electronically Signed by           Florentin Quinn PA-C  11/13/17 RIN Baumann  11/13/17 RIN Baumann  11/13/17 [address] [facility] RIN  11/13/17 1265

## 2017-11-18 LAB
BACTERIA BLD CULT: NORMAL
BACTERIA BLD CULT: NORMAL

## 2018-01-10 NOTE — MISCELLANEOUS
Provider Comments  Provider Comments:   LVM regarding no show appt on 7/25/2016      Signatures   Electronically signed by : Jenna Reddy DO; Jul 25 2016  1:18PM EST                       (Author)

## 2018-01-11 NOTE — RESULT NOTES
Verified Results  (1) CBC/PLT/DIFF [id number] 07:45AM Iza Noriega     Test Name Result Flag Reference   WBC COUNT 5 10 Thousand/uL  4 80-10 80   WBC ADJUSTED 5 10 Thousand/uL  4 80-10 80   RBC COUNT 4 60 Million/uL L 4 70-6 10   HEMOGLOBIN 14 8 g/dL  14 0-18 0   HEMATOCRIT 42 5 %  42 0-52 0   MCV 92 fL  82-98   MCH 32 1 pg H 27 0-31 0   MCHC 34 8 g/dL  31 4-37 4   RDW 13 8 %  11 6-15 1   MPV 7 6 fL L 8 9-12 7   PLATELET COUNT 824 Thousands/uL  130-400   nRBC AUTOMATED 0 /100 WBCs     NEUTROPHILS RELATIVE PERCENT 61 %  43-75   LYMPHOCYTES RELATIVE PERCENT 25 %  14-44   MONOCYTES RELATIVE PERCENT 9 %  4-12   EOSINOPHILS RELATIVE PERCENT 6 %  0-6   BASOPHILS RELATIVE PERCENT 1 %  0-1   NEUTROPHILS ABSOLUTE COUNT 3 10 Thousands/?L  1 85-7 62   LYMPHOCYTES ABSOLUTE COUNT 1 30 Thousands/?L  0 60-4 47   MONOCYTES ABSOLUTE COUNT 0 40 Thousand/?L  0 17-1 22   EOSINOPHILS ABSOLUTE COUNT 0 30 Thousand/?L  0 00-0 61   BASOPHILS ABSOLUTE COUNT 0 00 Thousands/?L  0 00-0 10     (1) COMPREHENSIVE METABOLIC PANEL [id number] 89:01ON Iza Noriega     Test Name Result Flag Reference   GLUCOSE,RANDM 118 mg/dL     If the patient is fasting, the ADA then defines impaired fasting glucose as > 100 mg/dL and diabetes as > or equal to 123 mg/dL     SODIUM 142 mmol/L  136-145   POTASSIUM 4 5 mmol/L  3 5-5 3   CHLORIDE 105 mmol/L  100-108   CARBON DIOXIDE 30 mmol/L  21-32   ANION GAP (CALC) 7 mmol/L  4-13   BLOOD UREA NITROGEN 9 mg/dL  5-25   CREATININE 1 02 mg/dL  0 60-1 30   Standardized to IDMS reference method   CALCIUM 8 6 mg/dL  8 3-10 1   BILI, TOTAL 0 40 mg/dL  0 20-1 00   ALK PHOSPHATAS 73 U/L     ALT (SGPT) 31 U/L  12-78   AST(SGOT) 10 U/L  5-45   ALBUMIN 3 5 g/dL  3 5-5 0   TOTAL PROTEIN 6 7 g/dL  6 4-8 2   eGFR Non-African American      >60 0 ml/min/1 73sq m   [employer] Disease Education Program recommendations are as follows:  GFR calculation is accurate only with a steady state creatinine  Chronic Kidney disease less than 60 ml/min/1 73 sq  meters  Kidney failure less than 15 ml/min/1 73 sq  meters  (1) LIPID PANEL, FASTING [id number] 07:45AM Comfort Slider     Test Name Result Flag Reference   CHOLESTEROL 152 mg/dL     HDL,DIRECT 33 mg/dL L 40-60   Specimen collection should occur prior to Metamizole administration due to the potential for falsely depressed results  LDL CHOLESTEROL CALCULATED 72 mg/dL  0-100   Triglyceride:         Normal              <150 mg/dl       Borderline High    150-199 mg/dl       High               200-499 mg/dl       Very High          >499 mg/dl  Cholesterol:         Desirable        <200 mg/dl      Borderline High  200-239 mg/dl      High             >239 mg/dl  HDL Cholesterol:        High    >59 mg/dL      Low     <41 mg/dL  LDL CALCULATED:    This screening LDL is a calculated result  It does not have the accuracy of the Direct Measured LDL in the monitoring of patients with hyperlipidemia and/or statin therapy  Direct Measure LDL (QGH453) must be ordered separately in these patients  TRIGLYCERIDES 233 mg/dL H <=150   Specimen collection should occur prior to N-Acetylcysteine or Metamizole administration due to the potential for falsely depressed results  (1) TSH [id number] 07:45AM Comfort Slider     Test Name Result Flag Reference   TSH 1 937 uIU/mL  0 358-3 740   Patients undergoing fluorescein dye angiography may retain small amounts of fluorescein in the body for 48-72 hours post procedure  Samples containing fluorescein can produce falsely depressed TSH values  If the patient had this procedure,a specimen should be resubmitted post fluorescein clearance         Discussion/Summary   Will discuss labs at follow up appt

## 2018-01-11 NOTE — RESULT NOTES
Discussion/Summary   labs acceptable     Verified Results  (1) COMPREHENSIVE METABOLIC PANEL [id number] 73:29DR Madai Sage     Test Name Result Flag Reference   SODIUM 141 mmol/L  136-145   POTASSIUM 4 0 mmol/L  3 5-5 3   CHLORIDE 106 mmol/L  100-108   CARBON DIOXIDE 29 mmol/L  21-32   ANION GAP (CALC) 6 mmol/L  4-13   BLOOD UREA NITROGEN 16 mg/dL  5-25   CREATININE 1 01 mg/dL  0 60-1 30   Standardized to IDMS reference method   CALCIUM 9 0 mg/dL  8 3-10 1   BILI, TOTAL 0 50 mg/dL  0 20-1 00   ALK PHOSPHATAS 67 U/L     ALT (SGPT) 27 U/L  12-78   AST(SGOT) 17 U/L  5-45   ALBUMIN 3 8 g/dL  3 5-5 0   TOTAL PROTEIN 6 8 g/dL  6 4-8 2   eGFR Non-African American      >60 0 ml/min/1 73sq m   [facility] [employer] Disease Education Program recommendations are as follows:  GFR calculation is accurate only with a steady state creatinine  Chronic Kidney disease less than 60 ml/min/1 73 sq  meters  Kidney failure less than 15 ml/min/1 73 sq  meters  GLUCOSE FASTING 98 mg/dL  65-99     (1) LIPID PANEL, FASTING 24Apr2017 10:23AM Madai Sage     Test Name Result Flag Reference   CHOLESTEROL 167 mg/dL     HDL,DIRECT 41 mg/dL  40-60   Specimen collection should occur prior to Metamizole administration due to the potential for falsely depressed results  LDL CHOLESTEROL CALCULATED 104 mg/dL H 0-100   This is a fasting blood test  Water,black tea or black  coffee only after 9:00pm the night before test  Drink 2 glasses of water the morning of test         Triglyceride:         Normal              <150 mg/dl       Borderline High    150-199 mg/dl       High               200-499 mg/dl       Very High          >499 mg/dl  Cholesterol:         Desirable        <200 mg/dl      Borderline High  200-239 mg/dl      High             >239 mg/dl  HDL Cholesterol:        High    >59 mg/dL      Low     <41 mg/dL  LDL CALCULATED:    This screening LDL is a calculated result    It does not have the accuracy of the Direct Measured LDL in the monitoring of patients with hyperlipidemia and/or statin therapy  Direct Measure LDL (JDU549) must be ordered separately in these patients  TRIGLYCERIDES 109 mg/dL  <=150   Specimen collection should occur prior to N-Acetylcysteine or Metamizole administration due to the potential for falsely depressed results  (1) HEMOGLOBIN A1C 24Apr2017 10:23AM Madai [place]     Test Name Result Flag Reference   HEMOGLOBIN A1C 5 5 %  4 2-6 3   EST  AVG   GLUCOSE 111 mg/dl

## 2018-01-12 VITALS
RESPIRATION RATE: 16 BRPM | WEIGHT: 273 LBS | HEART RATE: 72 BPM | TEMPERATURE: 96.1 F | BODY MASS INDEX: 36.98 KG/M2 | DIASTOLIC BLOOD PRESSURE: 60 MMHG | HEIGHT: 72 IN | SYSTOLIC BLOOD PRESSURE: 112 MMHG

## 2018-01-12 NOTE — RESULT NOTES
Discussion/Summary   discuss results with pt advised on sed rate and what that means  lyme and rheum factor negative     Verified Results  (1) RHEUMATOID FACTOR SCREEN 07Sep2017 11:17AM Bibiana Brain Order Number: [id number]     Test Name Result Flag Reference   RHEUMATOID FACTOR Negative  Negative     (1) SED RATE 07Sep2017 11:17AM LucasVitaldent Brain Order Number: [id number]     Test Name Result Flag Reference   SED RATE 26 mm/hour H 0-10     (1) LYME ANTIBODY PROFILE [facility] TO WESTERN BLOT 07Sep2017 11:17AM LucasVitaldent Brain Order Number: [id number]     Test Name Result Flag Reference   LYME IGG 0 25  0 00-0 79   NEGATIVE(0 00-0 79)-Absence of detectable Borrelia IgG Antibodies  A negative result does not exclude the possibility of Borrelia infection  If early Lyme disease is suspected,a second sample should be collected & tested 4 weeks after initial testing  LYME IGM 0 34  0 00-0 79   NEGATIVE (0 00-0 79)-Absence of detectable Borrelia IgM antibodies  A negative result does not exclude the possibility of Borrelia infection  If early lyme disease is suspected, a second sample should be collected & tested 4 weeks after initial testing

## 2018-01-13 VITALS
HEART RATE: 82 BPM | TEMPERATURE: 95 F | SYSTOLIC BLOOD PRESSURE: 132 MMHG | DIASTOLIC BLOOD PRESSURE: 82 MMHG | RESPIRATION RATE: 16 BRPM | BODY MASS INDEX: 36.98 KG/M2 | HEIGHT: 72 IN | WEIGHT: 273 LBS

## 2018-01-14 VITALS
BODY MASS INDEX: 37.55 KG/M2 | DIASTOLIC BLOOD PRESSURE: 80 MMHG | SYSTOLIC BLOOD PRESSURE: 138 MMHG | WEIGHT: 277.25 LBS | HEIGHT: 72 IN | OXYGEN SATURATION: 96 % | HEART RATE: 68 BPM

## 2018-01-14 NOTE — RESULT NOTES
Discussion/Summary   rheumatoid factors are all negative     Verified Results  (1) CYCLIC CITRULLINATED PEPTIDE [id number] 11:17AM Jose [place] Order Number: [id number]     Test Name Result Flag Reference   CYCLIC CITRULLINATED PEPTIDE 3 units  0 - 19   Negative               <20                            Weak positive      20 - 39                            Moderate positive  40 - 59                            Strong positive        >59    Performed at:  [address]  [id number]  : Caitie Zhou MD, Phone:  [phone]

## 2018-01-15 NOTE — RESULT NOTES
Verified Results  CARDIAC CATHETERIZATION 2016 03:32PM Mary Nip     Test Name Result Flag Reference   CARDIAC CATHETERIZATION (Report)     [address]   [phone]     [facility]     Invasive Cardiovascular Lab Complete Report     Patient: Magy Luu   MR number: [id number]   Account number: [de-identified]   Study date: 04/15/2016   Gender: Male   : 1960   Height: 72 in   Weight: 271 5 lb   BSA: 2 43 m squared     Allergies: AMOXICILLIN     Diagnostic Cardiologist: Damian Knox MD   Interventional Cardiologist: Damian Knox MD   Primary Physician: Enis Riddles Lombardi, DO SUMMARY     CORONARY CIRCULATION:   Left main: The vessel was normal sized  Angiography showed mild   atherosclerosis  LAD: The vessel was normal sized  Angiography showed mild atherosclerosis  Circumflex: The vessel was normal sized  Angiography showed mild   atherosclerosis  Ramus intermedius: The vessel was large sized  Angiography   showed no evidence of disease  RCA: The vessel was normal sized and dominant,   giving rise to the PDA and a posterolateral branch  There was a 99% stenosis in   mid vessel  This was the culprit for the patient's STEMI  CARDIAC STRUCTURES:   The inferior wall was severely hypokinetic  Global LV function was preserved  The EF was estimated at 55 %  (Left heart catheterization was performed after   PCI )     HEMODYNAMICS:   Hemodynamic assessment demonstrated mildly elevated LVEDP (12 mmHg, increasing   to 20 mmHg after atrial systole)  1ST LESION INTERVENTIONS:   Following pre-dilation and under GuideLiner support, a Xience Alpine Rx 3 5 x   23mm drug-eluting stent was placed across the 99% lesion in the mid RCA and   deployed at a maximum inflation pressure of 14 lili   At the completion of the   procedure, there was no residual stenosis, and distal runoff was normal      REPORT ELEMENT SELECTION: Right radial access was employed  INDICATIONS:   -- Possible CAD: myocardial infarction with ST elevation (STEMI)  PROCEDURES PERFORMED     -- Left heart catheterization with ventriculography  -- Left coronary angiography  -- Right coronary angiography  -- Acute Myocardial Infarct  -- Coronary Catheterization (w/ LHC)  -- AMI PCI (KETURAH, PTCRA, PTCA) Single  -- Intervention on mid RCA: balloon angioplasty  PROCEDURE: The risks and alternatives of the procedures and conscious sedation   were explained to the patient and informed consent was obtained  The patient   was brought to the cath lab and placed on the table  The planned puncture sites   were prepped and draped in the usual sterile fashion  -- Right radial artery access  After performing an Vitaliy's test to verify   adequate ulnar artery supply to the hand, the radial site was prepped  The   puncture site was infiltrated with local anesthetic  The vessel was accessed   using the modified Seldinger technique, a wire was advanced into the vessel,   and a sheath was advanced over the wire into the vessel  -- Left heart catheterization with ventriculography  A catheter was advanced   over a guidewire into the ascending aorta  After recording ascending aortic   pressure, the catheter was advanced across the aortic valve and left   ventricular pressure was recorded  Ventriculography was performed  The catheter   was pulled back across the aortic valve and into the ascending aorta and   pullback pressures were obtained  -- Left coronary artery angiography  A catheter was advanced over a guidewire   into the aorta and positioned in the left coronary artery ostium under   fluoroscopic guidance  Angiography was performed  -- Right coronary artery angiography  A catheter was advanced over a guidewire   into the aorta and positioned in the right coronary artery ostium under   fluoroscopic guidance  Angiography was performed       -- Acute Myocardial Infarct  -- Coronary Catheterization (/ [facility])  LESION INTERVENTION: A balloon angioplasty procedure was performed on the   lesion in the mid RCA  -- Vessel setup was performed  A 6Fr  Runway FR 4 0 guiding catheter was used   to cannulate the vessel  -- Vessel setup was performed  A Forte Flop J 300cm wire was used to cross the   lesion  -- Balloon angioplasty was performed, using a Trek Rx 2 5 x 15mm balloon, with   3 inflations and a maximum inflation pressure of 15 lili  -- Following pre-dilation and under GuideLiner support, a Xience Alpine Rx 3 5   x 23mm drug-eluting stent was placed across the 99% lesion in the mid RCA and   deployed at a maximum inflation pressure of 14 lili  At the completion of the   procedure, there was no residual stenosis, and distal runoff was normal      -- Balloon angioplasty was performed, using a NC Trek Rx 3 5 x 20mm balloon,   with 1 inflations and a maximum inflation pressure of 16 lili  INTERVENTIONS:   -- AMI PCI (KETURAH, PTCRA, PTCA) Single  PROCEDURE COMPLETION: The patient tolerated the procedure well and was   discharged from the cath lab  TIMING: Test started at 15:38  Test concluded at   16:26  HEMOSTASIS: The sheath was removed  The site was compressed with a   Hemoband device  Hemostasis was obtained  MEDICATIONS GIVEN: Verapamil   (Isoptin, Calan, Covera), 1 25 mg, IA, at 15:46  Fentanyl (1OOmcg/2 ml), 50   mcg, IV, at 15:40  Versed (2mg/2ml), 2 mg, IV, at 15:40  1% Lidocaine, 0 5 ml,   subcutaneously, at 15:44  Nitroglycerin (200mcg/ml), 200 mcg, at 15:46  Heparin   1000 units/ml, 4,000 units, IV, at 15:47  Angiomax Bolus(250mg/50ml), 19 ml,   IV, at 15:52  Fentanyl (1OOmcg/2 ml), 25 mcg, IV, at 15:53  Versed (2mg/2ml), 1   mg, IV, at 15:53  Angiomax Drip (250mg/50ml), infusion rate of 44 ml/hr, IV, at   16:16  CONTRAST GIVEN: 32 ml Omnipaque (350mg I/ml)  130 ml Omnipaque (350 mg I   /ml)   RADIATION EXPOSURE: Fluoroscopy time: 12 48 min  HEMODYNAMICS: Hemodynamic assessment demonstrated mildly elevated LVEDP (12   mmHg, increasing to 20 mmHg after atrial systole)  VENTRICLES:  -- The inferior wall was severely hypokinetic  Global LV   function was preserved  The EF was estimated at 55 %  (Left heart   catheterization was performed after PCI )     CORONARY VESSELS:  -- Left main: The vessel was normal sized  Angiography   showed mild atherosclerosis  -- LAD: The vessel was normal sized  Angiography showed mild atherosclerosis  -- Circumflex: The vessel was normal sized  Angiography showed mild   atherosclerosis  -- Ramus intermedius: The vessel was large sized  Angiography   showed no evidence of disease  -- RCA: The vessel was normal sized and   dominant, giving rise to the PDA and a posterolateral branch  There was a 99%   stenosis in mid vessel  This was the culprit for the patient's STEMI  -- Mid RCA:     NOTE: Right radial access was employed  Right radial access was employed  Prepared and signed by     Poncho Levin MD   Signed 04/15/2016 16:57:46     Study diagram     Intervention results   Native coronary lesions:   · balloon angioplasty of mid RCA  Stent: Xience Alpine Rx 3 5 x 23mm   drug-eluting       Hemodynamic tables     Pressures: NO PHASE   Pressures: - HR: 70   Pressures: - Rhythm:   Pressures: -- Aortic Pressure (S/D/M): 137/63/79   Pressures: -- Left Ventricle (s/edp): 135/22/--     Outputs: NO PHASE   Outputs: -- CALCULATIONS: [age]   Outputs: -- CALCULATIONS: Body Surface Area: 2 43   Outputs: -- CALCULATIONS: Height in cm: 183 00   Outputs: -- CALCULATIONS: Sex: Male   Outputs: -- CALCULATIONS: Weight in k 40     (1) TROPONIN I 2016 02:41PM EPIC, Provider   Test ordered by: Tano Leos   Troponin I values > 0 04 indicate myocardial necrosis and risk of adverse clinical cardiac event stratified according to [facility] as follows:    0 05-0 09 ng/ml Cardiac risk not established  0 1-0 6 ng/ml Increased risk for adverse clinical event within the next 48 hours to 14 day  0 6-1 5 ng/ml Possible AMI (consistant with WHO criteria for AMI)  > 1 5 ng/ml Probable AMI (consistant with WHO criteria for AMI)     Test Name Result Flag Reference   TROPONIN I <0 02 ng/mL  <0 04     (1) COMPREHENSIVE METABOLIC PANEL [id number] 39:43UI [facility], Provider   Test ordered by: Heidy Santos Kidney Disease Education Program recommendations are as follows:  GFR calculation is accurate only with a steady state creatinine  Chronic Kidney disease less than 60 ml/min/1 73 sq  meters  Kidney failure less than 15 ml/min/1 73 sq  meters  Test Name Result Flag Reference   GLUCOSE,RANDM 125 mg/dL     If the patient is fasting, the ADA then defines impaired fasting glucose as > 100 mg/dL and diabetes as > or equal to 123 mg/dL     SODIUM 142 mmol/L  136-145   POTASSIUM 3 6 mmol/L  3 5-5 3   CHLORIDE 103 mmol/L  100-108   CARBON DIOXIDE 28 mmol/L  21-32   ANION GAP (CALC) 11 mmol/L  4-13   BLOOD UREA NITROGEN 14 mg/dL  5-25   CREATININE 1 10 mg/dL  0 60-1 30   Standardized to IDMS reference method   CALCIUM 9 2 mg/dL  8 3-10 1   BILI, TOTAL 0 70 mg/dL  0 20-1 00   ALK PHOSPHATAS 84 U/L     ALT (SGPT) 34 U/L  12-78   AST(SGOT) 24 U/L  5-45   ALBUMIN 4 4 g/dL  3 5-5 0   TOTAL PROTEIN 7 4 g/dL  6 4-8 2   eGFR Non-African American      >60 0 ml/min/1 73sq m     (1) PT WITH INR [id number] 02:41PM [facility], Provider   Test ordered by: Zilliant   for patients on Warfarin therapy     Test Name Result Flag Reference   INR 0 99  0 86-1 16   PT 10 5 seconds  9 4-11 7     (1) APTT [id number] 02:41PM [facility], Provider   Test ordered by: Zilliant     Test Name Result Flag Reference   PARTIAL THROMBOPLASTIN TIME 24 seconds  24-33   Therapeutic Heparin Range =  60-90 seconds

## 2018-01-16 NOTE — RESULT NOTES
Message   Spoke to patient and his wife about the CT scan findings and to schedule for both diagnostic EGD and colonoscopy on Plavix  We should not stop the Plavix  Verified Results  * CT ABDOMEN PELVIS W CONTRAST [id number] 09:02AM Jennifer Doctor     Test Name Result Flag Reference   CT ABDOMEN PELVIS W CONTRAST (Report)     CT ABDOMEN AND PELVIS WITH IV CONTRAST     INDICATION: Right flank pain  Right upper quadrant pain  COMPARISON: None  TECHNIQUE: CT examination of the abdomen and pelvis was performed after the administration of intravenous contrast  This examination, like all CT scans performed in the [address] [place] [address], was performed utilizing techniques to minimize    radiation dose exposure, including the use of iterative reconstruction and automated exposure control  Axial, sagittal, and coronal reformatted images were submitted for interpretation  100 cc of intravenous Omnipaque 350 was administered for this    examination  Enteric contrast was given  FINDINGS:     ABDOMEN     LOWER CHEST: Mild thickening of the walls of the distal esophagus  LIVER/BILIARY TREE: Unremarkable  GALLBLADDER: No calcified gallstones  No pericholecystic inflammatory change  SPLEEN: Unremarkable  PANCREAS: Unremarkable  ADRENAL GLANDS: Unremarkable  KIDNEYS/URETERS: Unremarkable  No hydronephrosis  1 cm exophytic cyst arising from the posterior aspect of the upper pole of the left kidney  STOMACH AND BOWEL: Mild thickening of the walls of the stomach  This is likely related to underdistention  Extensive diverticulosis coli  Thickening of the walls of the upper sigmoid which could be related to chronic diverticular disease  APPENDIX: No findings to suggest appendicitis  ABDOMINOPELVIC CAVITY: No ascites or free intraperitoneal air  No lymphadenopathy  VESSELS: Unremarkable for patient's age  PELVIS     REPRODUCTIVE ORGANS: Unremarkable for patient's age  URINARY BLADDER: Unremarkable  ABDOMINAL WALL/INGUINAL REGIONS: Unremarkable  OSSEOUS STRUCTURES: No acute fracture or destructive osseous lesion  IMPRESSION:       1  Extensive diverticulosis coli  Thickening of the walls of the sigmoid colon could be related to chronic diverticular disease, colonoscopy suggested  2  Thickening of the walls of the stomach  This likely is related to underdistention  This can be further evaluated with an upper GI or upper endoscopy  3   Thickening of the walls of the distal esophagus concerning for esophagitis  Workstation performed: [id number]     Signed by:   Leilani Astorga MD   10/11/16       Plan  Abdominal pain, Abnormal CT scan, esophagus, Abnormal CT scan, stomach    · EGD; Status:Hold For - Scheduling; Requested for:13Oct2016; Abnormal CT scan, sigmoid colon    · Suprep Bowel Prep Oral Solution; USE AS DIRECTED   · COLONOSCOPY (GI, SURG);  Status:Hold For - Scheduling; Requested [id number];

## 2018-01-22 VITALS
DIASTOLIC BLOOD PRESSURE: 66 MMHG | TEMPERATURE: 96.5 F | SYSTOLIC BLOOD PRESSURE: 120 MMHG | RESPIRATION RATE: 16 BRPM | HEIGHT: 72 IN | WEIGHT: 272 LBS | BODY MASS INDEX: 36.84 KG/M2 | HEART RATE: 72 BPM

## 2018-01-23 NOTE — MISCELLANEOUS
Assessment   1  STEMI (ST elevation myocardial infarction) (410 90) (I21 3)1   2  S/P drug eluting coronary stent placement (V45 82) (Z95 5)1   3  S/P right coronary artery (RCA) stent placement (V45 82) (Z95 5)1   4  CAD (coronary artery disease) (414 00) (I25 10)1   5  Presenile dementia, depressed type (290 13) (F03 90)2   6  BMI 37 0-37 9, adult (V85 37) (Z68 37)3   7  Obesity, Class II, BMI 35-39 9 (278 01) (E66 01)3      1 Amended By: Cecilia Edwards ; Apr 19 2016 2:28 PM EST   2 Amended By: Cecilia Edwards ; Apr 19 2016 2:36 PM EST   3 Amended By: Cecilia Edwards ; Apr 19 2016 2:54 PM EST    Plan  CAD (coronary artery disease), Presenile dementia, depressed type, S/P drug eluting  coronary stent placement, S/P right coronary artery (RCA) stent placement, STEMI (ST  elevation myocardial infarction)    · (1) CBC/PLT/DIFF; Status:Active; Requested for:19May2016; 1   Perform:[facility]; [facility]; For:CAD (coronary artery   disease), Presenile dementia, depressed type, S/P drug eluting coronary   stent placement, S/P right coronary artery (RCA) stent placement, STEMI   (ST elevation myocardial infarction); Ordered By:Lombardi, Frank T1    · (1) COMPREHENSIVE METABOLIC PANEL; Status:Active; Requested for:19May2016; 1   Perform:[facility]; [facility]; For:CAD (coronary artery   disease), Presenile dementia, depressed type, S/P drug eluting coronary   stent placement, S/P right coronary artery (RCA) stent placement, STEMI   (ST elevation myocardial infarction); Ordered By:Lombardi, Frank T1    · (1) LIPID PANEL, FASTING; Status:Active; Requested for:19May2016; 1   Perform:[facility]; [facility]; For:CAD (coronary artery   disease), Presenile dementia, depressed type, S/P drug eluting coronary   stent placement, S/P right coronary artery (RCA) stent placement, STEMI   (ST elevation myocardial infarction);  Ordered By:Lombardi, Frank T1    · (1) TSH; Status:Active; Requested for:36Uqi5559; 1   Perform:[facility]; [facility]; For:CAD (coronary artery   disease), Presenile dementia, depressed type, S/P drug eluting coronary   stent placement, S/P right coronary artery (RCA) stent placement, STEMI   (ST elevation myocardial infarction); Ordered By:Lombardi, Frank T1    · Follow-up visit in 5 weeks Evaluation and Treatment  Follow-up  Status: Hold For -  Scheduling  Requested for: [id number]   Ordered; For: CAD (coronary artery disease), Presenile dementia, depressed type, S/P   drug eluting coronary stent placement, S/P right coronary artery (RCA) stent placement,   STEMI (ST elevation myocardial infarction); Ordered By: Ashley Montana    Performed:   Due: [id number]   CAD (coronary artery disease), S/P drug eluting coronary stent placement, S/P right  coronary artery (RCA) stent placement, STEMI (ST elevation myocardial infarction)    · *1 - [facility] Physician Referral  Consult  Status: Hold For -  Scheduling  Requested for: [id number]   Ordered; For: CAD (coronary artery disease), S/P drug eluting coronary stent placement,   S/P right coronary artery (RCA) stent placement, STEMI (ST elevation myocardial   infarction); Ordered By: Ashley Montana  Performed:   Due: [id number]   () Care Summary provided  : Vanesa Ambriz   Obesity, Class II, BMI 35-39 9    · Begin a limited exercise program ; Status:Complete;   Done: 35IFZ6947 02:55PM3   Ordered; For:Obesity, Class II, BMI 35-39 9; Ordered By:Lombardi, Lonita Smart T3    · Begin or continue regular aerobic exercise  Gradually work up to at least 3 sessions of  30 minutes of exercise a week ; Status:Complete;   Done: 51PUW2908 02:55PM3   Ordered; For:Obesity, Class II, BMI 35-39 9; Ordered By:Lombardi, Lonita Smart T3    · Eat a low fat and low cholesterol diet ; Status:Complete;   Done: 73PJT8891 02:55PM3   Ordered; For:Obesity, Class II, BMI 35-39 9;  Ordered By:Lombardi, Lonita Smart T3 · Keep a diary of when and what you eat ; Status:Complete;   Done: 90HNZ3040 02:55PM3   Ordered; For:Obesity, Class II, BMI 35-39 9; Ordered By:Lombardi, Benita Huger    · Shared Decision Making Aid given; Status:Complete;   Done: 28UHU7420 02:55PM3   Ordered; For:Obesity, Class II, BMI 35-39 9; Ordered By:Lombardi, Caron Savers T3    · Some eating tips that can help you lose weight ; Status:Complete;   Done: 15ZYA3907  02:55PM3   Ordered; For:Obesity, Class II, BMI 35-39 9; Ordered By:Lombardi, Caron Savers T3    · Stretch and warm up your muscles during the first 10 minutes , then cool down your  muscles for the last 10 minutes of exercise ; Status:Complete;   Done: 99RJH8939  02:55PM3   Ordered; For:Obesity, Class II, BMI 35-39 9; Ordered By:Lombardi, Caron Savebrooke T3    · There are many exercise options for seniors ; Status:Complete;   Done: 06VEG0519  02:55PM3   Ordered; For:Obesity, Class II, BMI 35-39 9; Ordered By:Lombardi, Caron Savers T3    · We encourage all of our patients to exercise regularly  30 minutes of exercise or physical  activity five or more days a week is recommended for children and adults ;  Status:Complete;   Done: 41FGT4546 02:55PM3   Ordered; For:Obesity, Class II, BMI 35-39 9; Ordered By:Lombardi, Caron Savers T3    · We recommend that you bring your body mass index down to 26 ; Status:Complete;    Done: 22CJE7108 02:55PM3   Ordered; For:Obesity, Class II, BMI 35-39 9; Ordered By:Lombardi, Caron Savers T3    · We recommend you modify your diet to achieve and maintain a healthy weight  Being  overweight may increase your risk for developing health problems such as diabetes,  heart disease, and cancer  Avoid high fat foods and eat a balanced diet rich  in fruits and vegetables  The combination of a reduced-calorie diet and increased  physical activity is recommended    Please let us know if you would like to  learn more about your nutrition and calorie needs, and additional options including  weight loss programs that can help you achieve your goals ; Status:Complete;   Done:  73VHK0726 02:55PM3   Ordered; For:Obesity, Class II, BMI 35-39 9; Ordered By:Lombardi, Kara Goldmann T3    · We want you to follow the Therapeutic Lifestyle Changes (TLC) diet ; Status:Complete;    Done: 36QCU4297 02:55PM3   Ordered; For:Obesity, Class II, BMI 35-39 9; Ordered By:Lombardi, Kara Goldmann T3    · Call [phone] if: You are considering suicide ; Status:Complete;   Done:  61AGC1949 02:55PM3   Ordered; For:Obesity, Class II, BMI 35-39 9; Ordered By:Lombardi, Kara Goldmann T3    · Call [phone] if: You are having difficulty sleeping (insomnia) ; Status:Complete;    Done: 25HWN0480 02:55PM3   Ordered; For:Obesity, Class II, BMI 35-39 9; Ordered By:Lombardi, Kara Goldmann T3    · Call [phone] if: You are urinating too frequently ; Status:Complete;   Done:  85LES0993 02:55PM3   Ordered; For:Obesity, Class II, BMI 35-39 9; Ordered By:Lombardi, Kara Goldmann T3    · Call [phone] if: You feel thirsty most of the time ; Status:Complete;   Done:  14Jqm0275 02:55PM3   Ordered; For:Obesity, Class II, BMI 35-39 9; Ordered By:Lombardi, Kara Goldmann T3    · Call [phone] if: You feel your heart is beating very fast or skipping beats ;  Status:Complete;   Done: 54YYO4754 02:55PM3   Ordered; For:Obesity, Class II, BMI 35-39 9; Ordered By:Lombardi, Kara Goldmann T3    · Call [phone] if: You have feelings of extreme sadness and feelings of  hopelessness ; Status:Complete;   Done: 83GPE7424 02:55PM3   Ordered; For:Obesity, Class II, BMI 35-39 9; Ordered By:Lombardi, Kara Goldmann T3    · Call [phone] if: You have pain in your abdomen ; Status:Complete;   Done:  05ASB8952 02:55PM3   Ordered; For:Obesity, Class II, BMI 35-39 9; Ordered By:Lombardi, Kara Goldmann T3    · Call [phone] if: You have symptoms of sleep apnea ; Status:Complete;   Done:  79QBM9352 02:55PM3   Ordered; For:Obesity, Class II, BMI 35-39 9;  Ordered By:Lombardi, Kara Goldmann T3    · Call [phone] if: You have sudden or severe chest pain with shortness of breath, rapid  breathing, or cough ; Status:Complete;   Done: 20KAP0951 02:55PM3   Ordered; For:Obesity, Class II, BMI 35-39 9; Ordered By:Lombardi, George Lima T3    · Seek Immediate Medical Attention if: You experience a new kind of chest pain (angina) or  pressure ; Status:Complete;   Done: 98ZUC7377 02:55PM3   Ordered; For:Obesity, Class II, BMI 35-39 9; Ordered By:Lombardi, George Lima T3      1 Amended By: Jose Ortiz ; Apr 19 2016 2:46 PM EST   2 Amended By: Jose Ortiz ; Apr 19 2016 2:52 PM EST   3 Amended By: Jose Ortiz ; Apr 19 2016 2:55 PM EST    Discussion/Summary  Discussion Summary:   Spoke with pt discussed MI and going forward  DIscussed secondary preventipn  BP is quite low  I will decrease the BB and will follow his pressure1        1 Amended By: Jose Ortiz ; Apr 19 2016 2:56 PM EST    Chief Complaint  I spoke with Kee's wife on 4/18/16 after his hospital discharge to home on 4/16/16  He is currently outside in their yard doing "a little pruning" and she is watching him closely to ensure that he doesn't over do it  She said he is feeling well and denies chest pain or dyspnea  He has a follow up appt set up with his cardiologist Dr Caridad Torres on 4/28/16  He has a drug eluding stent in place in the RCA as per Mrs Contreras Every  She is aware that they can call our office at any time 24/7 with any questions or concerns and if any episodes of chest pain, dizziness, weakness, palpitations, dyspnea, etc he needs to go to the ER [facility]   Chief Complaint Free Text Note Form: pt present for a TCM  hg1        1 Amended By: Hillary Anne; Apr 19 2016 2:08 PM EST    History of Present Illness  TCM Communication [facility]: The patient is being contacted for follow-up after hospitalization  Hospital records were reviewed  He was hospitalized at [facility]  The date of discharge: 4/17/16  Diagnosis: ST elevation MI- Inferior  He was discharged to home       He scheduled a follow up appointment  Follow-up appointments with other specialists: Dr Juani Tyler 4/28/16  Counseling was provided to patient's family  I spoke with his wife  Topics counseled included instructions for management, risk factor reductions, patient and family education, activities of daily living and importance of compliance with treatment  Communication performed and completed by 4/18/16 Jos   HPI: nurses TCM note appreciated    Pt states he was having chest pain - was not feeling right  pt developed sweats  pt had pain in his rt shoulder  pt ended up going to the ed  pt ended up getting diagnosed with a STEMI - treated with a stent    Pt has a follow up appt wt DR Juani Tyler on the 28th     pt has not ad any chets pain since discharge, no SOB is a little achy  pt generally sweats a lot so its hard for him to know if that is part of cardiac symptoms  pt states he has been getting dizzy with getting up from a seated position, also if he bends  Pt states he quit smoking with his MI and he is eating better    1        1 Amended By: Rylie Dunaway ; Apr 19 2016 2:30 PM EST    Review of Systems  Complete-Male:   Constitutional:1  No fever or chills, feels well, no tiredness, no recent weight gain or weight loss1   Eyes:1  No complaints of eye pain, no red eyes, no discharge from eyes, no itchy eyes1   ENT:1  no complaints of earache, no hearing loss, no nosebleeds, no nasal discharge, no sore throat, no hoarseness1   Cardiovascular: 1  No complaints of slow heart rate, no fast heart rate, no chest pain, no palpitations, no leg claudication, no lower extremity1   Respiratory:1  No complaints of shortness of breath, no wheezing, no cough, no SOB on exertion, no orthopnea or PND1   Gastrointestinal:1  No complaints of abdominal pain, no constipation, no nausea or vomiting, no diarrhea or bloody stools1      Psychiatric:1  Is not suicidal, no sleep disturbances, no anxiety or depression, no change in personality, no emotional problems1   Endocrine:1  No complaints of proptosis, no hot flashes, no muscle weakness, no erectile dysfunction, no deepening of the voice, no feelings of weakness1   Hematologic/Lymphatic:1  No complaints of swollen glands, no swollen glands in the neck, does not bleed easily, no easy bruising1         1 Amended By: Jey Rodas ; Apr 19 2016 2:53 PM EST    Active Problems   1  Abdominal bruit (785 9) (R09 89)  2  Dermoid cyst of face (216 3) (D23 30)  3  Gout (274 9) (M10 9)  4  Poor dentition (525 9) (K08 8)  5  Presenile dementia, depressed type (290 13) (F03 90)  6  Tooth pain (525 9) (K08 8)  7  Well adult on routine health check (V70 0) (Z00 00)    Past Medical History   1  History of _ (272 1)  2  History of _ (781 1)  3  History of Cough (786 2) (R05)  4  History of acute bronchitis (V12 69) (Z87 09)  5  History of fatigue (V13 89) (Z87 898)  6  History of Otitis externa, right  7  History of Puncture wound of foot, left (892 0) (S91 332A)  8  History of Upper back pain on left side (724 5) (M54 9)    Surgical History  Surgical History Reviewed: The surgical history was reviewed and updated today  1        1 Amended By: Jey Rodas ; Apr 19 2016 2:33 PM EST    Family History   1  Family history of hypertension (V17 49) (Z82 49)  Family History Reviewed: The family history was reviewed and updated today  1        1 Amended By: Jey Rodas ; Apr 19 2016 2:33 PM EST    Social History    · Former smoker (Q13 42) (Z57 265)    Social History Reviewed: The social history was reviewed and updated today  1        1 Amended By: Jey Rodas ; Apr 19 2016 2:33 PM EST    Current Meds  1  Aspirin 325 MG Oral Tablet; TAKE 1 TABLET DAILY; Therapy: (Recorded:65Zrc5946) to Recorded  2  Lipitor 80 MG Oral Tablet; TAKE 1 TABLET DAILY; Therapy: (Recorded:93Bip6151) to Recorded  3  Metoprolol Tartrate 25 MG Oral Tablet; 1 TABLET TWICE DAILY;    Therapy: (Recorded:20Cnj4056) to Recorded  4  Nitrostat 0 4 MG Sublingual Tablet Sublingual; TAKE AS DIRECTED; Therapy: (Recorded:18Apr2016) to Recorded  5  Plavix 75 MG Oral Tablet; TAKE 1 TABLET DAILY; Therapy: (Recorded:18Apr2016) to Recorded  6  Viibryd 40 MG Oral Tablet; 1 DAILY; Therapy: (Recorded:18Apr2016) to Recorded  7  Viibryd 40 MG Oral Tablet; 1 DAILY; Therapy: 84TGF1089 to (Last Rx:04Mar2016)  Requested for: 97CYB4027 Ordered  8  Zestril 10 MG Oral Tablet; 1 every day; Therapy: (Recorded:48Hut8300) to Recorded  Medication List Reviewed: The medication list was reviewed and updated today  Allergies   1  AMOXICILLIN    Physical Exam    Constitutional1    General appearance: Abnormal  1  Overweight1   Eyes1    Conjunctiva and lids: No swelling, erythema, or discharge  1    Pupils and irises: Equal, round and reactive to light  1    Ears, Nose, Mouth, and Throat1    External inspection of ears and nose: Normal 1    Otoscopic examination: Tympanic membrance translucent with normal light reflex  Canals patent without erythema  1    Pulmonary1    Auscultation of lungs: Clear to auscultation, equal breath sounds bilaterally, no wheezes, no rales, no rhonci  1    Cardiovascular1    Auscultation of heart: Normal rate and rhythm, normal S1 and S2, without murmurs  1    Abdomen1    Abdomen: Non-tender, no masses  1    Musculoskeletal1    Gait and station: Normal 1    Digits and nails: Normal without clubbing or cyanosis  1    Skin1    Skin and subcutaneous tissue: Normal without rashes or lesions  1    Neurologic1    Cranial nerves: Cranial nerves 2-12 intact  1    Psychiatric1    Orientation to person, place and time: Normal 1          1 Amended By: Hao Michael ; Apr 19 2016 2:54 PM EST    Results/Data  Results   CARDIAC CATHETERIZATION1 15Apr2016 03:32PM1 [facility]     Test Name Result Flag Reference   CARDIAC CATHETERIZATION1 [address]   [address] [place] [address] [phone]     [facility]     Invasive Cardiovascular Lab Complete Report     Patient: Fadia Mai   MR number: [id number]   Account number: [de-identified]   Study date: 04/15/2016   Gender: Male   : 1960   Height: 72 in   Weight: 271 5 lb   BSA: 2 43 m squared     Allergies: AMOXICILLIN     Diagnostic Cardiologist: Sonia Lake MD   Interventional Cardiologist: Sonia Lake MD   Primary Physician: Lynett Ranger Lombardi, DO SUMMARY     CORONARY CIRCULATION:   Left main: The vessel was normal sized  Angiography showed mild   atherosclerosis  LAD: The vessel was normal sized  Angiography showed mild atherosclerosis  Circumflex: The vessel was normal sized  Angiography showed mild   atherosclerosis  Ramus intermedius: The vessel was large sized  Angiography   showed no evidence of disease  RCA: The vessel was normal sized and dominant,   giving rise to the PDA and a posterolateral branch  There was a 99% stenosis in   mid vessel  This was the culprit for the patient's STEMI  CARDIAC STRUCTURES:   The inferior wall was severely hypokinetic  Global LV function was preserved  The EF was estimated at 55 %  (Left heart catheterization was performed after   PCI )     HEMODYNAMICS:   Hemodynamic assessment demonstrated mildly elevated LVEDP (12 mmHg, increasing   to 20 mmHg after atrial systole)  1ST LESION INTERVENTIONS:   Following pre-dilation and under GuideLiner support, a Xience Alpine Rx 3 5 x   23mm drug-eluting stent was placed across the 99% lesion in the mid RCA and   deployed at a maximum inflation pressure of 14 lili  At the completion of the   procedure, there was no residual stenosis, and distal runoff was normal      REPORT ELEMENT SELECTION:   Right radial access was employed  INDICATIONS:   -- Possible CAD: myocardial infarction with ST elevation (STEMI)  PROCEDURES PERFORMED     -- Left heart catheterization with ventriculography  -- Left coronary angiography  -- Right coronary angiography  -- Acute Myocardial Infarct  -- Coronary Catheterization (w/ LHC)  -- AMI PCI (KETURAH, PTCRA, PTCA) Single  -- Intervention on mid RCA: balloon angioplasty  PROCEDURE: The risks and alternatives of the procedures and conscious sedation   were explained to the patient and informed consent was obtained  The patient   was brought to the cath lab and placed on the table  The planned puncture sites   were prepped and draped in the usual sterile fashion  -- Right radial artery access  After performing an Vitaliy's test to verify   adequate ulnar artery supply to the hand, the radial site was prepped  The   puncture site was infiltrated with local anesthetic  The vessel was accessed   using the modified Seldinger technique, a wire was advanced into the vessel,   and a sheath was advanced over the wire into the vessel  -- Left heart catheterization with ventriculography  A catheter was advanced   over a guidewire into the ascending aorta  After recording ascending aortic   pressure, the catheter was advanced across the aortic valve and left   ventricular pressure was recorded  Ventriculography was performed  The catheter   was pulled back across the aortic valve and into the ascending aorta and   pullback pressures were obtained  -- Left coronary artery angiography  A catheter was advanced over a guidewire   into the aorta and positioned in the left coronary artery ostium under   fluoroscopic guidance  Angiography was performed  -- Right coronary artery angiography  A catheter was advanced over a guidewire   into the aorta and positioned in the right coronary artery ostium under   fluoroscopic guidance  Angiography was performed  -- Acute Myocardial Infarct  -- Coronary Catheterization (w/ LHC)  LESION INTERVENTION: A balloon angioplasty procedure was performed on the   lesion in the mid RCA  -- Vessel setup was performed  A 6Fr   Runway FR 4 0 guiding catheter was used   to cannulate the vessel  -- Vessel setup was performed  A Forte Flop J 300cm wire was used to cross the   lesion  -- Balloon angioplasty was performed, using a Trek Rx 2 5 x 15mm balloon, with   3 inflations and a maximum inflation pressure of 15 lili  -- Following pre-dilation and under GuideLiner support, a Xience Alpine Rx 3 5   x 23mm drug-eluting stent was placed across the 99% lesion in the mid RCA and   deployed at a maximum inflation pressure of 14 lili  At the completion of the   procedure, there was no residual stenosis, and distal runoff was normal      -- Balloon angioplasty was performed, using a NC Trek Rx 3 5 x 20mm balloon,   with 1 inflations and a maximum inflation pressure of 16 lili  INTERVENTIONS:   -- AMI PCI (KETURAH, PTCRA, PTCA) Single  PROCEDURE COMPLETION: The patient tolerated the procedure well and was   discharged from the cath lab  TIMING: Test started at 15:38  Test concluded at   16:26  HEMOSTASIS: The sheath was removed  The site was compressed with a   Hemoband device  Hemostasis was obtained  MEDICATIONS GIVEN: Verapamil   (Isoptin, Calan, Covera), 1 25 mg, IA, at 15:46  Fentanyl (1OOmcg/2 ml), 50   mcg, IV, at 15:40  Versed (2mg/2ml), 2 mg, IV, at 15:40  1% Lidocaine, 0 5 ml,   subcutaneously, at 15:44  Nitroglycerin (200mcg/ml), 200 mcg, at 15:46  Heparin   1000 units/ml, 4,000 units, IV, at 15:47  Angiomax Bolus(250mg/50ml), 19 ml,   IV, at 15:52  Fentanyl (1OOmcg/2 ml), 25 mcg, IV, at 15:53  Versed (2mg/2ml), 1   mg, IV, at 15:53  Angiomax Drip (250mg/50ml), infusion rate of 44 ml/hr, IV, at   16:16  CONTRAST GIVEN: 32 ml Omnipaque (350mg I/ml)  130 ml Omnipaque (350 mg I   /ml)  RADIATION EXPOSURE: Fluoroscopy time: 12 48 min  HEMODYNAMICS: Hemodynamic assessment demonstrated mildly elevated LVEDP (12   mmHg, increasing to 20 mmHg after atrial systole)  VENTRICLES:  -- The inferior wall was severely hypokinetic   Global LV   function was preserved  The EF was estimated at 55 %  (Left heart   catheterization was performed after PCI )     CORONARY VESSELS:  -- Left main: The vessel was normal sized  Angiography   showed mild atherosclerosis  -- LAD: The vessel was normal sized  Angiography showed mild atherosclerosis  -- Circumflex: The vessel was normal sized  Angiography showed mild   atherosclerosis  -- Ramus intermedius: The vessel was large sized  Angiography   showed no evidence of disease  -- RCA: The vessel was normal sized and   dominant, giving rise to the PDA and a posterolateral branch  There was a 99%   stenosis in mid vessel  This was the culprit for the patient's STEMI  -- Mid RCA:     NOTE: Right radial access was employed  Right radial access was employed  Prepared and signed by     Padmini Gallo MD   Signed 04/15/2016 16:57:46     Study diagram     Intervention results   Native coronary lesions:   · balloon angioplasty of mid RCA  Stent: Xience Alpine Rx 3 5 x 23mm   drug-eluting  Hemodynamic tables     Pressures: NO PHASE   Pressures: - HR: 70   Pressures: - Rhythm:   Pressures: -- Aortic Pressure (S/D/M): 137/63/79   Pressures: -- Left Ventricle (s/edp): 135/22/--     Outputs: NO PHASE   Outputs: -- CALCULATIONS[age]   Outputs: -- CALCULATIONS: Body Surface Area: 2 43   Outputs: -- CALCULATIONS: Height in cm: 183 00   Outputs: -- CALCULATIONS: Sex: Male   Outputs: -- CALCULATIONS: Weight in k 40          1  Amended By: Marcus Chakraborty ; 2016 2:34 PM EST Message   Recorded as Task   Date: 2016 06:30 AM, Created By: System   Task Name: Rojas Rice   Assigned To:  Harish Aranda   Regarding Patient: Tasia Auguste, Status: Active   Comment:   System - 2016 6:30 AM    Patient discharged from hospital   Patient Name: Ham Mary  Patient YOB: 1960  Discharge Date: 2016  Facility: [facility] 2016 8:32 AM    TASK REASSIGNED: Previously Assigned To Everett Haq - 18 Apr 2016 9:46 AM    TASK EDITED  LM to [facility] & NSG LPN   Daija Springer - 18 Apr 2016 1:51 PM    TASK EDITED  Pt 's wife Cheryl Jada) called to return your call  Please call Holger Christopher back at [phone]  Future Appointments    Date/Time Provider Specialty Site   04/28/2016 04:00 PM Leonarda Cuello DO Cardiology [address]   04/19/2016 02:15 PM Daria Hester 33     Signatures   Electronically signed by : Vimal Tejada, ; Apr 18 2016  2:38PM EST                       (Co-author)    Electronically signed by : Yessica Humphrey DO; Apr 18 2016  3:06PM EST                       (Author)    Electronically signed by : Yessica Humphrey DO;  Apr 19 2016  2:56PM EST                       (Author)

## 2018-02-08 ENCOUNTER — OFFICE VISIT (OUTPATIENT)
Dept: SURGERY | Facility: CLINIC | Age: 58
End: 2018-02-08
Payer: COMMERCIAL

## 2018-02-08 VITALS
SYSTOLIC BLOOD PRESSURE: 110 MMHG | HEART RATE: 101 BPM | WEIGHT: 277.6 LBS | DIASTOLIC BLOOD PRESSURE: 60 MMHG | BODY MASS INDEX: 37.6 KG/M2 | HEIGHT: 72 IN | TEMPERATURE: 97.7 F

## 2018-02-08 DIAGNOSIS — D48.5 NEOPLASM OF UNCERTAIN BEHAVIOR OF SKIN OF HAND: ICD-10-CM

## 2018-02-08 DIAGNOSIS — D48.5 NEOPLASM OF UNCERTAIN BEHAVIOR OF SKIN OF BACK: Primary | ICD-10-CM

## 2018-02-08 DIAGNOSIS — D48.5 NEOPLASM OF UNCERTAIN BEHAVIOR OF SKIN OF WRIST: ICD-10-CM

## 2018-02-08 DIAGNOSIS — D48.7 NEOPLASM OF UNCERTAIN BEHAVIOR OF FACE: ICD-10-CM

## 2018-02-08 PROCEDURE — 99204 OFFICE O/P NEW MOD 45 MIN: CPT | Performed by: SPECIALIST

## 2018-02-08 RX ORDER — MULTIVIT-MINERALS/FA/LYCOPENE 0.4 MG-6
1 TABLET ORAL DAILY
COMMUNITY

## 2018-02-08 RX ORDER — VARDENAFIL HYDROCHLORIDE 20 MG/1
TABLET ORAL
COMMUNITY
Start: 2017-09-07 | End: 2018-02-12 | Stop reason: RX

## 2018-02-08 RX ORDER — NITROGLYCERIN 0.4 MG/1
TABLET SUBLINGUAL
COMMUNITY

## 2018-02-08 NOTE — PROGRESS NOTES
History and Physical    Nate Ledesma II 56 y o  male MRN: [id number]  Unit/Bed#:  Encounter: [id number]    History of Present Illness     HPI:  Jami Dunaway is a 56 y o  male who presents with abnormal lesions of his left hand and back and right temple  The right temple is been present for approximately three years  And the left hand is been there for years  In the back has been there for years as well  His wife referred him here today is concerned because these are dark on the back and he has a family history of melanoma  He wants to have excision  The one on the back has been changing color  The other wounds are stable in size  There has been no drainage  Review of Systems   All other systems reviewed and are negative  Historical Information   Past Medical History:   Diagnosis Date    Coronary artery disease     Depression     Hyperlipidemia     Myocardial infarction 04/15/2016    99% RCA block    Psychiatric disorder     depression     Past Surgical History:   Procedure Laterality Date    APPENDECTOMY      COLONOSCOPY N/A 12/14/2016    Procedure: COLONOSCOPY;  Surgeon: Ansley Jordan MD;  Location: [place] [address] GI LAB; Service:     CORONARY ANGIOPLASTY WITH STENT PLACEMENT Right 04/15/2016    ESOPHAGOGASTRODUODENOSCOPY N/A 12/14/2016    Procedure: ESOPHAGOGASTRODUODENOSCOPY (EGD); Surgeon: Ansley Jordan MD;  Location: [facility] GI LAB; Service:      Social History   History   Alcohol Use    Yes     Comment: 2-3 beers every cpl days     History   Drug Use    Frequency: 2 0 times per week    Types: Marijuana     Comment: last time this a m       History   Smoking Status    Former Smoker    Packs/day: 0 50    Years: 15 00    Types: Cigarettes    Quit date: 4/15/2016   Smokeless Tobacco    Never Used     Family History:   Family History   Problem Relation Age of Onset    Arthritis Mother     Hypertension Father     Heart disease Father     Hypertension Brother     Cancer Maternal Grandfather        Meds/Allergies   all current active meds have been reviewed, current meds: No current facility-administered medications for this visit  and PTA meds:    (Not in a hospital admission)  Allergies   Allergen Reactions    Amoxicillin Itching    Lipitor [Atorvastatin]        Objective   First Vitals:       Current Vitals:   Blood Pressure: 110/60 (02/08/18 1255)  Pulse: 101 (02/08/18 1255)  Temperature: 97 7 °F (36 5 °C) (02/08/18 1255)  Temp Source: Oral (02/08/18 1255)  Height: 6' (182 9 cm) (02/08/18 1255)  Weight - Scale: 126 kg (277 lb 9 6 oz) (02/08/18 1255)    [unfilled]    Invasive Devices          No matching active lines, drains, or airways          Physical Exam   Constitutional: He is oriented to person, place, and time  He appears well-developed and well-nourished  No distress  HENT:   Head: Normocephalic and atraumatic  Right Ear: External ear normal    Left Ear: External ear normal    Nose: Nose normal    Mouth/Throat: Oropharynx is clear and moist  No oropharyngeal exudate  Eyes: Conjunctivae and EOM are normal  Pupils are equal, round, and reactive to light  Right eye exhibits no discharge  Left eye exhibits no discharge  No scleral icterus  Neck: Normal range of motion  Neck supple  No JVD present  No tracheal deviation present  No thyromegaly present  Cardiovascular: Normal rate, regular rhythm and intact distal pulses  Exam reveals no gallop and no friction rub  No murmur heard  Pulmonary/Chest: Effort normal and breath sounds normal  No stridor  No respiratory distress  He has no wheezes  He has no rales  He exhibits no tenderness  Abdominal: Soft  Bowel sounds are normal  He exhibits no distension and no mass  There is no tenderness  There is no rebound and no guarding  Musculoskeletal: Normal range of motion  He exhibits no edema, tenderness or deformity  Lymphadenopathy:     He has no cervical adenopathy     Neurological: He is alert and oriented to person, place, and time  He has normal reflexes  No cranial nerve deficit  He exhibits normal muscle tone  Coordination normal    Skin: Skin is warm  No rash noted  He is not diaphoretic  No erythema  No pallor  Scabbing ulcerated lesion of left hand measuring 1 x 1 cm  Black lesion of back measuring 1 x 1 cm  And red lesion of right cheek   Psychiatric: He has a normal mood and affect  His behavior is normal  Judgment and thought content normal        Lab Results: I have personally reviewed pertinent lab results  , CBC: Lab Results   Component Value Date    WBC 5 27 11/13/2017    HGB 14 2 11/13/2017    HCT 42 8 11/13/2017    MCV 89 11/13/2017     11/13/2017    ADJUSTEDWBC 6 60 09/21/2016    MCH 29 6 11/13/2017    MCHC 33 2 11/13/2017    RDW 14 7 11/13/2017    MPV 9 6 11/13/2017    NRBC 0 06/02/2017   , CMP: Lab Results   Component Value Date     11/13/2017     11/13/2017    CO2 30 11/13/2017    ANIONGAP 8 11/13/2017    BUN 15 11/13/2017    CREATININE 1 23 11/13/2017    GLUCOSE 104 11/13/2017    CALCIUM 9 1 11/13/2017    AST 18 11/13/2017    ALT 37 11/13/2017    ALKPHOS 85 11/13/2017    PROT 7 4 11/13/2017    BILITOT 0 50 11/13/2017    EGFR 66 11/13/2017   , Coagulation:   Lab Results   Component Value Date    INR 1 00 11/13/2017   , Urinalysis: No results found for: Hettie Krishnamurthy, SPECGRAV, PHUR, LEUKOCYTESUR, NITRITE, PROTEINUA, GLUCOSEU, KETONESU, BILIRUBINUR, BLOODU, Amylase: No results found for: AMYLASE, Lipase: No results found for: LIPASE  Imaging: No results found  EKG, Pathology, and Other Studies: I have personally reviewed pertinent reports  Assessment/Plan     Assessment:  Rubber comes today for evaluation of abnormal lesions of his left hand, back, and right cheek  These been present many years but have been changing  The back is become dark  And hand has become ulcerated  He has a family history of melanoma    His his wife is sent here for evaluation to have these lesions excised  Plan:    Excision of lesions under local anesthesia  The risks of bleeding infection recurrence  Counseling / Coordination of Care  Total floor / unit time spent today 45minutes  Greater than 50% of total time was spent with the patient and / or family counseling and / or coordination of care  A description of the counseling / coordination of care: 20

## 2018-02-08 NOTE — H&P
History and Physical    Jami Wilson II 56 y o  male MRN: [id number]  Unit/Bed#:  Encounter: [id number]    History of Present Illness     HPI:  Kaylin Merlos is a 56 y o  male who presents with abnormal lesions of his left hand and back and right temple  The right temple is been present for approximately three years  And the left hand is been there for years  In the back has been there for years as well  His wife referred him here today is concerned because these are dark on the back and he has a family history of melanoma  He wants to have excision  The one on the back has been changing color  The other wounds are stable in size  There has been no drainage  Review of Systems   All other systems reviewed and are negative  Historical Information   Past Medical History:   Diagnosis Date    Coronary artery disease     Depression     Hyperlipidemia     Myocardial infarction 04/15/2016    99% RCA block    Psychiatric disorder     depression     Past Surgical History:   Procedure Laterality Date    APPENDECTOMY      COLONOSCOPY N/A 12/14/2016    Procedure: COLONOSCOPY;  Surgeon: Liv Amato MD;  Location: [facility] GI LAB; Service:     CORONARY ANGIOPLASTY WITH STENT PLACEMENT Right 04/15/2016    ESOPHAGOGASTRODUODENOSCOPY N/A 12/14/2016    Procedure: ESOPHAGOGASTRODUODENOSCOPY (EGD); Surgeon: Liv Amato MD;  Location: [facility] GI LAB; Service:      Social History   History   Alcohol Use    Yes     Comment: 2-3 beers every cpl days     History   Drug Use    Frequency: 2 0 times per week    Types: Marijuana     Comment: last time this a m       History   Smoking Status    Former Smoker    Packs/day: 0 50    Years: 15 00    Types: Cigarettes    Quit date: 4/15/2016   Smokeless Tobacco    Never Used     Family History:   Family History   Problem Relation Age of Onset    Arthritis Mother     Hypertension Father     Heart disease Father     Hypertension Brother     Cancer Maternal Grandfather        Meds/Allergies   all current active meds have been reviewed, current meds: No current facility-administered medications for this visit  and PTA meds:    (Not in a hospital admission)  Allergies   Allergen Reactions    Amoxicillin Itching    Lipitor [Atorvastatin]        Objective   First Vitals:       Current Vitals:   Blood Pressure: 110/60 (02/08/18 1255)  Pulse: 101 (02/08/18 1255)  Temperature: 97 7 °F (36 5 °C) (02/08/18 1255)  Temp Source: Oral (02/08/18 1255)  Height: 6' (182 9 cm) (02/08/18 1255)  Weight - Scale: 126 kg (277 lb 9 6 oz) (02/08/18 1255)    [unfilled]    Invasive Devices          No matching active lines, drains, or airways          Physical Exam   Constitutional: He is oriented to person, place, and time  He appears well-developed and well-nourished  No distress  HENT:   Head: Normocephalic and atraumatic  Right Ear: External ear normal    Left Ear: External ear normal    Nose: Nose normal    Mouth/Throat: Oropharynx is clear and moist  No oropharyngeal exudate  Eyes: Conjunctivae and EOM are normal  Pupils are equal, round, and reactive to light  Right eye exhibits no discharge  Left eye exhibits no discharge  No scleral icterus  Neck: Normal range of motion  Neck supple  No JVD present  No tracheal deviation present  No thyromegaly present  Cardiovascular: Normal rate, regular rhythm and intact distal pulses  Exam reveals no gallop and no friction rub  No murmur heard  Pulmonary/Chest: Effort normal and breath sounds normal  No stridor  No respiratory distress  He has no wheezes  He has no rales  He exhibits no tenderness  Abdominal: Soft  Bowel sounds are normal  He exhibits no distension and no mass  There is no tenderness  There is no rebound and no guarding  Musculoskeletal: Normal range of motion  He exhibits no edema, tenderness or deformity  Lymphadenopathy:     He has no cervical adenopathy     Neurological: He is alert and oriented to person, place, and time  He has normal reflexes  No cranial nerve deficit  He exhibits normal muscle tone  Coordination normal    Skin: Skin is warm  No rash noted  He is not diaphoretic  No erythema  No pallor  Scabbing ulcerated lesion of left hand measuring 1 x 1 cm  Black lesion of back measuring 1 x 1 cm  And red lesion of right cheek   Psychiatric: He has a normal mood and affect  His behavior is normal  Judgment and thought content normal        Lab Results: I have personally reviewed pertinent lab results  , CBC: Lab Results   Component Value Date    WBC 5 27 11/13/2017    HGB 14 2 11/13/2017    HCT 42 8 11/13/2017    MCV 89 11/13/2017     11/13/2017    ADJUSTEDWBC 6 60 09/21/2016    MCH 29 6 11/13/2017    MCHC 33 2 11/13/2017    RDW 14 7 11/13/2017    MPV 9 6 11/13/2017    NRBC 0 06/02/2017   , CMP: Lab Results   Component Value Date     11/13/2017     11/13/2017    CO2 30 11/13/2017    ANIONGAP 8 11/13/2017    BUN 15 11/13/2017    CREATININE 1 23 11/13/2017    GLUCOSE 104 11/13/2017    CALCIUM 9 1 11/13/2017    AST 18 11/13/2017    ALT 37 11/13/2017    ALKPHOS 85 11/13/2017    PROT 7 4 11/13/2017    BILITOT 0 50 11/13/2017    EGFR 66 11/13/2017   , Coagulation:   Lab Results   Component Value Date    INR 1 00 11/13/2017   , Urinalysis: No results found for: Tracie Cheese, SPECGRAV, PHUR, LEUKOCYTESUR, NITRITE, PROTEINUA, GLUCOSEU, KETONESU, BILIRUBINUR, BLOODU, Amylase: No results found for: AMYLASE, Lipase: No results found for: LIPASE  Imaging: No results found  EKG, Pathology, and Other Studies: I have personally reviewed pertinent reports  Assessment/Plan     Assessment:  Rubber comes today for evaluation of abnormal lesions of his left hand, back, and right cheek  These been present many years but have been changing  The back is become dark  And hand has become ulcerated  He has a family history of melanoma    His his wife is sent here for evaluation to have these lesions excised  Plan:    Excision of lesions under local anesthesia  The risks of bleeding infection recurrence  Counseling / Coordination of Care  Total floor / unit time spent today 45minutes  Greater than 50% of total time was spent with the patient and / or family counseling and / or coordination of care  A description of the counseling / coordination of care: 20

## 2018-02-12 ENCOUNTER — TELEPHONE (OUTPATIENT)
Dept: CARDIOLOGY CLINIC | Facility: CLINIC | Age: 58
End: 2018-02-12

## 2018-02-13 NOTE — TELEPHONE ENCOUNTER
Left v/m with patient to contact office if Dr Moisés Beal needs a written note to be sent to them about aspirin hold

## 2018-02-15 ENCOUNTER — TELEPHONE (OUTPATIENT)
Dept: CARDIOLOGY CLINIC | Facility: CLINIC | Age: 58
End: 2018-02-15

## 2018-02-15 NOTE — TELEPHONE ENCOUNTER
Spoke with Candace Tobias - [facility] nurse - Dr Dash Ortega would like at least 3-4 day of holding asa and for our office to contact the patient with instructions  Pt will resume asa as soon as possible after procedure

## 2018-03-01 ENCOUNTER — HOSPITAL ENCOUNTER (OUTPATIENT)
Facility: AMBULARY SURGERY CENTER | Age: 58
Setting detail: OUTPATIENT SURGERY
Discharge: HOME | End: 2018-03-01
Attending: SPECIALIST | Admitting: SPECIALIST
Payer: COMMERCIAL

## 2018-03-01 VITALS
TEMPERATURE: 98.4 F | SYSTOLIC BLOOD PRESSURE: 139 MMHG | OXYGEN SATURATION: 99 % | HEART RATE: 60 BPM | RESPIRATION RATE: 18 BRPM | DIASTOLIC BLOOD PRESSURE: 81 MMHG

## 2018-03-01 DIAGNOSIS — D48.5 NEOPLASM OF UNCERTAIN BEHAVIOR OF SKIN OF BACK: ICD-10-CM

## 2018-03-01 DIAGNOSIS — D48.5 NEOPLASM OF UNCERTAIN BEHAVIOR OF SKIN OF WRIST: ICD-10-CM

## 2018-03-01 PROCEDURE — 12041 INTMD RPR N-HF/GENIT 2.5CM/<: CPT | Performed by: SPECIALIST

## 2018-03-01 PROCEDURE — 11402 EXC TR-EXT B9+MARG 1.1-2 CM: CPT | Performed by: SPECIALIST

## 2018-03-01 PROCEDURE — 88305 TISSUE EXAM BY PATHOLOGIST: CPT | Performed by: SPECIALIST

## 2018-03-01 PROCEDURE — 11421 EXC H-F-NK-SP B9+MARG 0.6-1: CPT | Performed by: SPECIALIST

## 2018-03-01 PROCEDURE — 88305 TISSUE EXAM BY PATHOLOGIST: CPT | Performed by: PATHOLOGY

## 2018-03-01 PROCEDURE — 12031 INTMD RPR S/A/T/EXT 2.5 CM/<: CPT | Performed by: SPECIALIST

## 2018-03-01 RX ORDER — LIDOCAINE HYDROCHLORIDE AND EPINEPHRINE 10; 10 MG/ML; UG/ML
INJECTION, SOLUTION INFILTRATION; PERINEURAL AS NEEDED
Status: DISCONTINUED | OUTPATIENT
Start: 2018-03-01 | End: 2018-03-01 | Stop reason: HOSPADM

## 2018-03-01 RX ORDER — SODIUM BICARBONATE 42 MG/ML
INJECTION, SOLUTION INTRAVENOUS AS NEEDED
Status: DISCONTINUED | OUTPATIENT
Start: 2018-03-01 | End: 2018-03-01 | Stop reason: HOSPADM

## 2018-03-01 NOTE — DISCHARGE INSTRUCTIONS
MINOR SURGERY DISCHARGE INSTRUCTIONS    Armani Kessler II    Please follow carefully all instructions checked below  Procedure: Excision    1  Activity:  · Resume normal activity  2  Dressing:  · Remove dressing tomorrow  3  Bathing:  · May shower  4  Medications:  · May take Tylenol or Ibuprofen (Ceci Cheng) for pain  (Use only as instructed on the bottle)    5  Call your doctor for any of the following:  · Any persistent dizziness  6  Bathing:  · May shower after 2 days  7  Follow-up Care:  · Make an appointment to see your doctor in 7 days        Brittanie Bowman MD  03/01/18  9:38 AM

## 2018-03-01 NOTE — H&P (VIEW-ONLY)
History and Physical    Elie Viveros II 56 y o  male MRN: [id number]  Unit/Bed#:  Encounter: [id number]    History of Present Illness     HPI:  Keven Dakin is a 56 y o  male who presents with abnormal lesions of his left hand and back and right temple  The right temple is been present for approximately three years  And the left hand is been there for years  In the back has been there for years as well  His wife referred him here today is concerned because these are dark on the back and he has a family history of melanoma  He wants to have excision  The one on the back has been changing color  The other wounds are stable in size  There has been no drainage  Review of Systems   All other systems reviewed and are negative  Historical Information   Past Medical History:   Diagnosis Date    Coronary artery disease     Depression     Hyperlipidemia     Myocardial infarction 04/15/2016    99% RCA block    Psychiatric disorder     depression     Past Surgical History:   Procedure Laterality Date    APPENDECTOMY      COLONOSCOPY N/A 12/14/2016    Procedure: COLONOSCOPY;  Surgeon: Yvonne Cole MD;  Location: [facility] GI LAB; Service:     CORONARY ANGIOPLASTY WITH STENT PLACEMENT Right 04/15/2016    ESOPHAGOGASTRODUODENOSCOPY N/A 12/14/2016    Procedure: ESOPHAGOGASTRODUODENOSCOPY (EGD); Surgeon: Yvonne Cole MD;  Location: [facility] GI LAB; Service:      Social History   History   Alcohol Use    Yes     Comment: 2-3 beers every cpl days     History   Drug Use    Frequency: 2 0 times per week    Types: Marijuana     Comment: last time this a m       History   Smoking Status    Former Smoker    Packs/day: 0 50    Years: 15 00    Types: Cigarettes    Quit date: 4/15/2016   Smokeless Tobacco    Never Used     Family History:   Family History   Problem Relation Age of Onset    Arthritis Mother     Hypertension Father     Heart disease Father     Hypertension Brother     Cancer Maternal Grandfather        Meds/Allergies   all current active meds have been reviewed, current meds: No current facility-administered medications for this visit  and PTA meds:    (Not in a hospital admission)  Allergies   Allergen Reactions    Amoxicillin Itching    Lipitor [Atorvastatin]        Objective   First Vitals:       Current Vitals:   Blood Pressure: 110/60 (02/08/18 1255)  Pulse: 101 (02/08/18 1255)  Temperature: 97 7 °F (36 5 °C) (02/08/18 1255)  Temp Source: Oral (02/08/18 1255)  Height: 6' (182 9 cm) (02/08/18 1255)  Weight - Scale: 126 kg (277 lb 9 6 oz) (02/08/18 1255)    [unfilled]    Invasive Devices          No matching active lines, drains, or airways          Physical Exam   Constitutional: He is oriented to person, place, and time  He appears well-developed and well-nourished  No distress  HENT:   Head: Normocephalic and atraumatic  Right Ear: External ear normal    Left Ear: External ear normal    Nose: Nose normal    Mouth/Throat: Oropharynx is clear and moist  No oropharyngeal exudate  Eyes: Conjunctivae and EOM are normal  Pupils are equal, round, and reactive to light  Right eye exhibits no discharge  Left eye exhibits no discharge  No scleral icterus  Neck: Normal range of motion  Neck supple  No JVD present  No tracheal deviation present  No thyromegaly present  Cardiovascular: Normal rate, regular rhythm and intact distal pulses  Exam reveals no gallop and no friction rub  No murmur heard  Pulmonary/Chest: Effort normal and breath sounds normal  No stridor  No respiratory distress  He has no wheezes  He has no rales  He exhibits no tenderness  Abdominal: Soft  Bowel sounds are normal  He exhibits no distension and no mass  There is no tenderness  There is no rebound and no guarding  Musculoskeletal: Normal range of motion  He exhibits no edema, tenderness or deformity  Lymphadenopathy:     He has no cervical adenopathy     Neurological: He is alert and oriented to person, place, and time  He has normal reflexes  No cranial nerve deficit  He exhibits normal muscle tone  Coordination normal    Skin: Skin is warm  No rash noted  He is not diaphoretic  No erythema  No pallor  Scabbing ulcerated lesion of left hand measuring 1 x 1 cm  Black lesion of back measuring 1 x 1 cm  And red lesion of right cheek   Psychiatric: He has a normal mood and affect  His behavior is normal  Judgment and thought content normal        Lab Results: I have personally reviewed pertinent lab results  , CBC: Lab Results   Component Value Date    WBC 5 27 11/13/2017    HGB 14 2 11/13/2017    HCT 42 8 11/13/2017    MCV 89 11/13/2017     11/13/2017    ADJUSTEDWBC 6 60 09/21/2016    MCH 29 6 11/13/2017    MCHC 33 2 11/13/2017    RDW 14 7 11/13/2017    MPV 9 6 11/13/2017    NRBC 0 06/02/2017   , CMP: Lab Results   Component Value Date     11/13/2017     11/13/2017    CO2 30 11/13/2017    ANIONGAP 8 11/13/2017    BUN 15 11/13/2017    CREATININE 1 23 11/13/2017    GLUCOSE 104 11/13/2017    CALCIUM 9 1 11/13/2017    AST 18 11/13/2017    ALT 37 11/13/2017    ALKPHOS 85 11/13/2017    PROT 7 4 11/13/2017    BILITOT 0 50 11/13/2017    EGFR 66 11/13/2017   , Coagulation:   Lab Results   Component Value Date    INR 1 00 11/13/2017   , Urinalysis: No results found for: Dafne Stain, SPECGRAV, PHUR, LEUKOCYTESUR, NITRITE, PROTEINUA, GLUCOSEU, KETONESU, BILIRUBINUR, BLOODU, Amylase: No results found for: AMYLASE, Lipase: No results found for: LIPASE  Imaging: No results found  EKG, Pathology, and Other Studies: I have personally reviewed pertinent reports  Assessment/Plan     Assessment:  Rubber comes today for evaluation of abnormal lesions of his left hand, back, and right cheek  These been present many years but have been changing  The back is become dark  And hand has become ulcerated  He has a family history of melanoma    His his wife is sent here for evaluation to have these lesions excised  Plan:    Excision of lesions under local anesthesia  The risks of bleeding infection recurrence  Counseling / Coordination of Care  Total floor / unit time spent today 45minutes  Greater than 50% of total time was spent with the patient and / or family counseling and / or coordination of care  A description of the counseling / coordination of care: 20

## 2018-03-01 NOTE — OP NOTE
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 [D48 5]  Neoplasm of uncertain behavior of skin of wrist [D48 5]    Post-Op Diagnosis Codes: * 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 [D48 5]     * Neoplasm of uncertain behavior of skin of wrist [D48 5]    Procedure(s) (LRB):  EXCISION LESIONS LEFT HAND AND BACK (Left)    Specimen(s):  ID Type Source Tests Collected by Time Destination   1 : lesion left back Tissue Lesion TISSUE EXAM Raisa Rodriguez MD 3/1/2018  9:06 AM    2 : lesion left hand Tissue Lesion TISSUE EXAM Raisa Rodriguez MD 3/1/2018  9:12 AM        Estimated Blood Loss:   Minimal    Drains:       Anesthesia Type:   Local    Operative Indications:  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 [D48 5]  Neoplasm of uncertain behavior of skin of wrist [D48 5]      Operative Findings:  Neoplasm of uncertain behavior of left back and left hand    Complications:   None    Procedure and Technique:  The patient was taken to the operating room at the [address] [place] [address] on this day where a time-out was called identifying Sylvia Day for excision of neoplasm of uncertain behavior of the left back and the left hand  Time-out included the patient's name, date of birth, procedure to be done, site of procedure, area marked, prepped and a dry  This was agreed to by the scrub and circulating nurses  And myself  The back was 1st attended to this measured 2 x 1 2 cm as the pre excision margin and the lesion itself measured on 2 x 1 cm  A sepsis was obtained with ChloraPrep  After waiting 3 minutes 1% lidocaine with epinephrine was infiltrated  Elliptical incision was made carried through the skin and subcutaneous tissue  Bleeders were clamped and coagulated    This one deepened to the subcutaneous tissue  The deep subcutaneous tissue was closed with layered repair techniques of interrupted sutures of 3-0 chromic  After the layered repair was finished the skin was closed with a continuous suture of four 0 nylon  Attention was then placed to the left hand  A black lesion was present  This measured 1 x 1 0 5 cm is the pre excision margin  A sepsis was obtained with ChloraPrep  After waiting 3 minutes 1% lidocaine with epinephrine was infiltrated  An elliptical incision was made carried through the skin and subcutaneous tissue  Bleeders were clamped and coagulated  The subcutaneous tissue was closed with layered repair techniques of interrupted sutures of 3-0 chromic    The skin was closed with a continuous suture of 4-0 nylon  I was present for the entire procedure    Patient Disposition:  PACU     SIGNATURE: Veronika Ricci MD  DATE: March 1, 2018  TIME: 9:29 AM

## 2018-03-08 ENCOUNTER — HOSPITAL ENCOUNTER (OUTPATIENT)
Facility: AMBULARY SURGERY CENTER | Age: 58
Setting detail: OUTPATIENT SURGERY
Discharge: HOME | End: 2018-03-08
Attending: SPECIALIST | Admitting: SPECIALIST
Payer: COMMERCIAL

## 2018-03-08 VITALS
TEMPERATURE: 96.9 F | OXYGEN SATURATION: 97 % | SYSTOLIC BLOOD PRESSURE: 151 MMHG | DIASTOLIC BLOOD PRESSURE: 80 MMHG | HEART RATE: 67 BPM | RESPIRATION RATE: 18 BRPM

## 2018-03-08 DIAGNOSIS — D48.7 NEOPLASM OF UNCERTAIN BEHAVIOR OF FACE: ICD-10-CM

## 2018-03-08 PROCEDURE — 88305 TISSUE EXAM BY PATHOLOGIST: CPT | Performed by: PATHOLOGY

## 2018-03-08 PROCEDURE — 88342 IMHCHEM/IMCYTCHM 1ST ANTB: CPT | Performed by: PATHOLOGY

## 2018-03-08 PROCEDURE — 11442 EXC FACE-MM B9+MARG 1.1-2 CM: CPT | Performed by: SPECIALIST

## 2018-03-08 PROCEDURE — 12041 INTMD RPR N-HF/GENIT 2.5CM/<: CPT | Performed by: SPECIALIST

## 2018-03-08 PROCEDURE — 12051 INTMD RPR FACE/MM 2.5 CM/<: CPT | Performed by: SPECIALIST

## 2018-03-08 PROCEDURE — 11622 EXC S/N/H/F/G MAL+MRG 1.1-2: CPT | Performed by: SPECIALIST

## 2018-03-08 RX ORDER — LIDOCAINE HYDROCHLORIDE AND EPINEPHRINE 10; 10 MG/ML; UG/ML
INJECTION, SOLUTION INFILTRATION; PERINEURAL AS NEEDED
Status: DISCONTINUED | OUTPATIENT
Start: 2018-03-08 | End: 2018-03-08 | Stop reason: HOSPADM

## 2018-03-08 NOTE — OP NOTE
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]    Procedure(s) (LRB):  EXCISION LESION, RIGHT CHEEK AND LEFT HAND (Right)    Specimen(s):  ID Type Source Tests Collected by Time Destination   1 : right cheek lesion undertermined origin Tissue Lesion TISSUE EXAM Chris Hernandez MD 3/8/2018 10:06 AM    2 : left hand lesion undertermined origin marked at 12 o clock Tissue Lesion TISSUE EXAM Chris Hernandez MD 3/8/2018 10:15 AM        Estimated Blood Loss:   Minimal    Drains:       Anesthesia Type:   Local    Operative Indications:  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]      Operative Findings:  Neoplasm of uncertain behavior of right face and left hand  Left hand lesion ulcerated    Complications:   None    Procedure and Technique:  The patient was taken to the operating room at [facility] on this day where a time-out was called identifying Nannette Gunter II for excision of neoplasm of uncertain behavior of the right cheek and left hand  The left hand was ulcerated  The time-out included the patient's name, date of birth, procedure to be done, site of procedure, area marked, prepped done and dry, allergies to amoxicillin Lipitor  Consent signed and obtained  This was agreed to by the scrub and circulating nurses and myself  Both areas were prepped with ChloraPrep  After waiting 3 minutes attention was placed to the right cheek  1% lidocaine with epinephrine was infiltrated  After waiting for local anesthesia to take effect and waiting 3 minutes for the ChloraPrep incision was made carried through the skin and deepened to subcutaneous tissue  Bleeders were clamped and coagulated    The deep subcutaneous tissue was closed with layered repair techniques of interrupted sutures of 4-0 chromic  The skin was closed with a continuous suture of 6-0 nylon  The specimen was sent to pathology for evaluation in formalin  The pre excision margin was 2 x 1 cm  Attention was placed to the left hand  When ulcerated lesion was present  The area was prepped with ChloraPrep  After waiting 3 minutes 1% lidocaine with epinephrine was infiltrated  An elliptical incision was made carried through the skin and deepened to the subcutaneous tissue  Bleeders were clamped and coagulated  The specimen was marked at the 12 o'clock position which was oriented toward the patient's wrist   The 6 o'clock position was oriented toward the patient's fingers  Hemostasis was achieved with the cautery  The deep subcutaneous tissue was closed with layered repair techniques of interrupted sutures of 3-0 chromic  The skin was closed with   4-0 nylon  In a continuous fashion  The specimen was sent to pathology for evaluation formalin  Bacitracin was applied sterile dressings were applied sponge and count were correct  Patient tolerated procedure well returned to PACU  The pre excision margin of the hand lesion was 2 x 2 cm    The margin occupied nearly the entire area   I was present for the entire procedure    Patient Disposition:  PACU     SIGNATURE: Zarina Leslie MD  DATE: March 8, 2018  TIME: 10:22 AM

## 2018-03-08 NOTE — DISCHARGE INSTRUCTIONS
MINOR SURGERY DISCHARGE INSTRUCTIONS    Bean Latif II    Please follow carefully all instructions checked below  Procedure: Excision    1  Activity:  · Resume normal activity  2  Dressing:  · Remove dressing tomorrow  3  Bathing:  · May shower  4  Medications:  · May take Tylenol or Ibuprofen (Advil, [employer]) for pain  (Use only as instructed on the bottle)    5  Call your doctor for any of the following:  · Any persistent dizziness  6  Bathing:  · May shower after 2 days  7  Follow-up Care:  · Make an appointment to see your doctor in 7 days        Perla Richardson MD  03/08/18  10:27 AM

## 2018-03-15 ENCOUNTER — OFFICE VISIT (OUTPATIENT)
Dept: SURGERY | Facility: CLINIC | Age: 58
End: 2018-03-15

## 2018-03-15 VITALS
BODY MASS INDEX: 37.64 KG/M2 | SYSTOLIC BLOOD PRESSURE: 112 MMHG | HEIGHT: 72 IN | TEMPERATURE: 98.4 F | WEIGHT: 277.9 LBS | DIASTOLIC BLOOD PRESSURE: 78 MMHG | HEART RATE: 63 BPM

## 2018-03-15 DIAGNOSIS — Z85.89 H/O SQUAMOUS CELL CARCINOMA: ICD-10-CM

## 2018-03-15 DIAGNOSIS — Z87.2 H/O SEBORRHEIC KERATOSIS: ICD-10-CM

## 2018-03-15 DIAGNOSIS — Z48.02 ENCOUNTER FOR REMOVAL OF SUTURES: Primary | ICD-10-CM

## 2018-03-15 PROCEDURE — 99024 POSTOP FOLLOW-UP VISIT: CPT | Performed by: SPECIALIST

## 2018-03-15 NOTE — PROGRESS NOTES
Assessment/Plan:  Serene Spatz comes today For suture removal   He is doing well  All wounds are clean and well healed  He had four lesions excised  Three benign the last one of his left hand was ulcerated which I was concerned about  This is a squamous cell carcinoma in situ  All margins are clear  I will give him a copy of the pathology report  I will check him in three months  He will call sooner for any problems  Diagnoses and all orders for this visit:    Encounter for removal of sutures    H/O seborrheic keratosis    H/O squamous cell carcinoma          Subjective:     Patient ID: Eddie Ace is a 56 y o  male  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   His last super Re surgeries were for a excision of a lesion of the left hand and right face  The right face is actinic keratosis pigmented and proliferative  Negative for malignancy  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 with adnexal extension arising in a predominant hypertrophic and proliferative actinic keratosis with severe atypia  Peripheral and deep margins uninvolved by inside carcinoma  The other lesion of his hand was a blue nevus and lesion of his back was benign  He comes today for suture removal of three of these lesions  There have been no problems no complaints  Suture / Staple Removal         Review of Systems  status post excision of two lesions of the left hand one of his back and one of his right cheek  The last one from his left hand squamous cell carcinoma in situ  All margins were clear  Objective:     Physical Exam   Skin:   Lesions of left hand, back, and right cheek well-healed wound clean no evidence of infection  All sutures removed with hemostats 11  Blade

## 2018-04-12 ENCOUNTER — OFFICE VISIT (OUTPATIENT)
Dept: FAMILY MEDICINE CLINIC | Facility: CLINIC | Age: 58
End: 2018-04-12
Payer: COMMERCIAL

## 2018-04-12 VITALS
SYSTOLIC BLOOD PRESSURE: 132 MMHG | RESPIRATION RATE: 18 BRPM | HEIGHT: 72 IN | WEIGHT: 272 LBS | DIASTOLIC BLOOD PRESSURE: 80 MMHG | BODY MASS INDEX: 36.84 KG/M2 | HEART RATE: 66 BPM | TEMPERATURE: 97.2 F

## 2018-04-12 DIAGNOSIS — E66.01 CLASS 2 SEVERE OBESITY DUE TO EXCESS CALORIES WITH SERIOUS COMORBIDITY AND BODY MASS INDEX (BMI) OF 36.0 TO 36.9 IN ADULT (HCC): ICD-10-CM

## 2018-04-12 DIAGNOSIS — Z13.6 SCREENING FOR CARDIOVASCULAR CONDITION: ICD-10-CM

## 2018-04-12 DIAGNOSIS — Z00.00 WELL ADULT EXAM: Primary | ICD-10-CM

## 2018-04-12 DIAGNOSIS — I21.11 ST ELEVATION (STEMI) MYOCARDIAL INFARCTION INVOLVING RIGHT CORONARY ARTERY (HCC): ICD-10-CM

## 2018-04-12 DIAGNOSIS — I25.10 ARTERIOSCLEROTIC HEART DISEASE: ICD-10-CM

## 2018-04-12 DIAGNOSIS — N52.03 COMBINED ARTERIAL INSUFFICIENCY AND CORPORO-VENOUS OCCLUSIVE ERECTILE DYSFUNCTION: ICD-10-CM

## 2018-04-12 DIAGNOSIS — F41.9 ANXIETY: ICD-10-CM

## 2018-04-12 DIAGNOSIS — F33.0 MILD EPISODE OF RECURRENT MAJOR DEPRESSIVE DISORDER (HCC): ICD-10-CM

## 2018-04-12 DIAGNOSIS — Z12.5 SCREENING FOR PROSTATE CANCER: ICD-10-CM

## 2018-04-12 PROBLEM — N52.9 ERECTILE DYSFUNCTION: Status: ACTIVE | Noted: 2017-09-07

## 2018-04-12 PROCEDURE — 99396 PREV VISIT EST AGE 40-64: CPT | Performed by: FAMILY MEDICINE

## 2018-04-12 RX ORDER — ROSUVASTATIN CALCIUM 20 MG/1
20 TABLET, COATED ORAL
Qty: 30 TABLET | Refills: 0
Start: 2018-04-12 | End: 2018-09-03 | Stop reason: SDUPTHER

## 2018-04-12 RX ORDER — BUPROPION HYDROCHLORIDE 300 MG/1
300 TABLET ORAL DAILY
Qty: 90 TABLET | Refills: 1
Start: 2018-04-12 | End: 2018-08-06 | Stop reason: SDUPTHER

## 2018-04-12 RX ORDER — SILDENAFIL 100 MG/1
100 TABLET, FILM COATED ORAL DAILY PRN
Qty: 10 TABLET | Refills: 0 | Status: SHIPPED | OUTPATIENT
Start: 2018-04-12 | End: 2018-08-14

## 2018-04-12 NOTE — PROGRESS NOTES
FAMILY PRACTICE HEALTH MAINTENANCE OFFICE VISIT  [facility]    NAME: Hoda Romero II  AGE: 56 y o   SEX: male  : 1960     DATE: 2018    Assessment and Plan     Problem List Items Addressed This Visit     ST elevation (STEMI) myocardial infarction involving right coronary artery (HCC)    Relevant Medications    rosuvastatin (CRESTOR) 20 MG tablet    sildenafil (VIAGRA) 100 mg tablet    Other Relevant Orders    CBC and differential    Arteriosclerotic heart disease    Relevant Medications    rosuvastatin (CRESTOR) 20 MG tablet    sildenafil (VIAGRA) 100 mg tablet    Other Relevant Orders    CBC and differential    Anxiety    Relevant Orders    CBC and differential    Combined arterial insufficiency and corporo-venous occlusive erectile dysfunction    Relevant Medications    sildenafil (VIAGRA) 100 mg tablet    Other Relevant Orders    CBC and differential    Mild episode of recurrent major depressive disorder (HCC)    Relevant Medications    buPROPion (WELLBUTRIN XL) 300 mg 24 hr tablet    Other Relevant Orders    CBC and differential      Other Visit Diagnoses     Well adult exam    -  Primary    Relevant Orders    CBC and differential    Screening for cardiovascular condition        Relevant Orders    Lipid Panel with Direct LDL reflex    Comprehensive metabolic panel    CBC and differential    Screening for prostate cancer        Relevant Orders    PSA, ultrasensitive    CBC and differential    BMI 36 0-36 9,adult        Relevant Orders    CBC and differential    Class 2 severe obesity due to excess calories with serious comorbidity and body mass index (BMI) of 36 0 to 36 9 in adult [facility])        Relevant Orders    CBC and differential            · Patient Counseling:   · Nutrition: Stressed importance of a well balanced diet, moderation of sodium/saturated fat, caloric balance and sufficient intake of fiber  · Exercise: Stressed the importance of regular exercise with a goal of 150 minutes per week  · Dental Health: Discussed daily flossing and brushing and regular dental visits   · Sexuality: Discussed sexually transmitted infections, use of condoms and prevention of unintended pregnancy  · Alcohol Use:  Recommended moderation of alcohol intake  · Injury Prevention: Discussed Safety Belts, Safety Helmets, and Smoke Detectors    · Immunizations reviewed  yes  · Discussed benefits of screening yes   · Discussed the patient's BMI with him  The BMI is above average; BMI management plan is completed     No Follow-up on file  Chief Complaint     Chief Complaint   Patient presents with    Physical Exam     [id number]       History of Present Illness     Pt here for full physical will need labs    Pt states while he is here he would like viagra        Well Adult Physical   Patient here for a comprehensive physical exam       Diet and Physical Activity  Diet: well balanced diet  Weight concerns: Patient has class 2 obesity (BMI 35 0-39  9)  Exercise: frequently      Depression Screen  PHQ-9 Depression Screening    PHQ-9:    Frequency of the following problems over the past two weeks:       Little interest or pleasure in doing things:  1 - several days  Feeling down, depressed, or hopeless:  1 - several days  Trouble falling or staying asleep, or sleeping too much:  0 - not at all  Feeling tired or having little energy:  1 - several days  Poor appetite or overeatin - several days  Feeling bad about yourself - or that you are a failure or have let yourself or your family down:  1 - several days  Trouble concentrating on things, such as reading the newspaper or watching television:  0 - not at all  Moving or speaking so slowly that other people could have noticed   Or the opposite - being so fidgety or restless that you have been moving around a lot more than usual:  0 - not at all  Thoughts that you would be better off dead, or of hurting yourself in some way:  0 - not at all  PHQ-2 Score:  2  PHQ-9 Score:  5          General Health  Hearing: Normal:  bilateral  Vision: no vision problems  Dental: no dental visits for >1 year    Reproductive Health        The following portions of the patient's history were reviewed and updated as appropriate: allergies, current medications, past family history, past medical history, past social history, past surgical history and problem list     Review of Systems     Review of Systems   Constitutional: Negative for activity change, appetite change, chills, diaphoresis, fatigue, fever and unexpected weight change  HENT: Negative for congestion, dental problem, ear pain, mouth sores, sinus pain, sinus pressure, sore throat and trouble swallowing  Eyes: Negative for photophobia, discharge and itching  Respiratory: Negative for apnea, chest tightness and shortness of breath  Cardiovascular: Negative for chest pain, palpitations and leg swelling  Gastrointestinal: Negative for abdominal distention, abdominal pain, blood in stool, nausea and vomiting  Endocrine: Negative for cold intolerance, heat intolerance, polydipsia, polyphagia and polyuria  Genitourinary: Negative for difficulty urinating  Musculoskeletal: Negative for arthralgias  Skin: Negative for color change and wound  Neurological: Negative for dizziness, syncope, speech difficulty and headaches  Hematological: Negative for adenopathy  Psychiatric/Behavioral: Negative for agitation and behavioral problems         Past Medical History     Past Medical History:   Diagnosis Date    Coronary artery disease     Depression     Dermoid cyst of face     resolved 04/19/2016    Hyperlipidemia     Myocardial infarction ([facility] [address] ) 04/15/2016    99% RCA block    Presenile dementia, depressed type     onst 11/03/2009, resolved 07/21/2016    Psychiatric disorder     depression       Past Surgical History     Past Surgical History:   Procedure Laterality Date    APPENDECTOMY  COLONOSCOPY N/A 12/14/2016    Procedure: COLONOSCOPY;  Surgeon: Kurtis Cruz MD;  Location: [facility] GI LAB; Service:     CORONARY ANGIOPLASTY WITH STENT PLACEMENT Right 04/15/2016    ESOPHAGOGASTRODUODENOSCOPY N/A 12/14/2016    Procedure: ESOPHAGOGASTRODUODENOSCOPY (EGD); Surgeon: Kurtis Cruz MD;  Location: [facility] GI LAB; Service:        Social History     Social History     Social History    Marital status: /Civil Union     Spouse name: N/A    Number of children: N/A    Years of education: N/A     Social History Main Topics    Smoking status: Former Smoker     Packs/day: 0 50     Years: 15 00     Types: Cigarettes     Quit date: 4/15/2016    Smokeless tobacco: Never Used    Alcohol use Yes      Comment: 2-3 beers every cpl days    Drug use: Yes     Frequency: 2 0 times per week     Types: Marijuana      Comment: last time this a m      Sexual activity: Not Asked     Other Topics Concern    None     Social History Narrative    Lack of exercise    Pets/animals: Dog    Sleeps 8-10 hours a day                   Family History     Family History   Problem Relation Age of Onset    Arthritis Mother     Hypertension Mother     Hypertension Father     Heart disease Father     Coronary artery disease Father     Depression Father     Hypertension Brother     Cancer Maternal Grandfather        Current Medications       Current Outpatient Prescriptions:     aspirin 81 mg chewable tablet, Chew 81 mg daily, Disp: , Rfl:     buPROPion (WELLBUTRIN XL) 300 mg 24 hr tablet, Take 1 tablet (300 mg total) by mouth daily, Disp: 90 tablet, Rfl: 1    Multiple Vitamins-Minerals (PX MENS MULTIVITAMINS) TABS, Take 1 tablet by mouth daily, Disp: , Rfl:     nitroglycerin (NITROSTAT) 0 4 mg SL tablet, Place under the tongue, Disp: , Rfl:     rosuvastatin (CRESTOR) 20 MG tablet, Take 1 tablet (20 mg total) by mouth daily with dinner, Disp: 30 tablet, Rfl: 0    sildenafil (VIAGRA) 100 mg tablet, Take 1 tablet (100 mg total) by mouth daily as needed for erectile dysfunction, Disp: 10 tablet, Rfl: 0     Allergies     Allergies   Allergen Reactions    Amoxicillin Itching    Lipitor [Atorvastatin]        Objective     /80   Pulse 66   Temp (!) 97 2 °F (36 2 °C)   Resp 18   Ht 6' (1 829 m)   Wt 123 kg (272 lb)   BMI 36 89 kg/m²      Physical Exam   Constitutional: He appears well-developed and well-nourished  No distress  HENT:   Head: Normocephalic and atraumatic  Right Ear: External ear normal    Left Ear: External ear normal    Nose: Nose normal    Mouth/Throat: Oropharynx is clear and moist  No oropharyngeal exudate  Eyes: EOM are normal  Pupils are equal, round, and reactive to light  Right eye exhibits no discharge  Left eye exhibits no discharge  No scleral icterus  Neck: No thyromegaly present  Cardiovascular: Normal rate and normal heart sounds  No murmur heard  Pulmonary/Chest: Effort normal and breath sounds normal  No respiratory distress  He has no wheezes  Abdominal: Soft  Bowel sounds are normal  He exhibits no distension and no mass  There is no tenderness  There is no rebound and no guarding  Genitourinary: Prostate normal and penis normal  Rectal exam shows guaiac negative stool  Musculoskeletal: Normal range of motion  Neurological: He is alert  He displays normal reflexes  Coordination normal    Skin: Skin is warm and dry  No rash noted  He is not diaphoretic  No erythema  Psychiatric: He has a normal mood and affect  His behavior is normal    Nursing note and vitals reviewed           Visual Acuity Screening    Right eye Left eye Both eyes   Without correction: 20/30 20/20 20/20   With correction:          Health Maintenance     Health Maintenance   Topic Date Due    HIV SCREENING  1960    Hepatitis C Screening  1960    Depression Screening PHQ-9  04/18/1974    INFLUENZA VACCINE  09/01/2017    DTaP,Tdap,and Td Vaccines (2 - Td) 08/25/2024    COLONOSCOPY  12/14/2026     Immunization History   Administered Date(s) Administered    Tdap 08/25/2014       Yasmany Carias, [address]

## 2018-07-24 ENCOUNTER — APPOINTMENT (OUTPATIENT)
Dept: LAB | Facility: CLINIC | Age: 58
End: 2018-07-24
Payer: COMMERCIAL

## 2018-07-24 ENCOUNTER — TRANSCRIBE ORDERS (OUTPATIENT)
Dept: LAB | Facility: CLINIC | Age: 58
End: 2018-07-24

## 2018-07-24 DIAGNOSIS — F41.9 ANXIETY: ICD-10-CM

## 2018-07-24 DIAGNOSIS — Z00.00 WELL ADULT EXAM: ICD-10-CM

## 2018-07-24 DIAGNOSIS — Z13.6 SCREENING FOR CARDIOVASCULAR CONDITION: ICD-10-CM

## 2018-07-24 DIAGNOSIS — I21.11 ST ELEVATION (STEMI) MYOCARDIAL INFARCTION INVOLVING RIGHT CORONARY ARTERY (HCC): ICD-10-CM

## 2018-07-24 DIAGNOSIS — Z00.8 ENCOUNTER FOR OTHER GENERAL EXAMINATION: Primary | ICD-10-CM

## 2018-07-24 DIAGNOSIS — F33.0 MILD EPISODE OF RECURRENT MAJOR DEPRESSIVE DISORDER (HCC): ICD-10-CM

## 2018-07-24 DIAGNOSIS — E66.01 CLASS 2 SEVERE OBESITY DUE TO EXCESS CALORIES WITH SERIOUS COMORBIDITY AND BODY MASS INDEX (BMI) OF 36.0 TO 36.9 IN ADULT (HCC): ICD-10-CM

## 2018-07-24 DIAGNOSIS — I25.10 ARTERIOSCLEROTIC HEART DISEASE: ICD-10-CM

## 2018-07-24 DIAGNOSIS — N52.03 COMBINED ARTERIAL INSUFFICIENCY AND CORPORO-VENOUS OCCLUSIVE ERECTILE DYSFUNCTION: ICD-10-CM

## 2018-07-24 DIAGNOSIS — Z12.5 SCREENING FOR PROSTATE CANCER: ICD-10-CM

## 2018-07-24 LAB
ALBUMIN SERPL BCP-MCNC: 3.7 G/DL (ref 3.5–5)
ALP SERPL-CCNC: 68 U/L (ref 46–116)
ALT SERPL W P-5'-P-CCNC: 27 U/L (ref 12–78)
ANION GAP SERPL CALCULATED.3IONS-SCNC: 7 MMOL/L (ref 4–13)
AST SERPL W P-5'-P-CCNC: 14 U/L (ref 5–45)
BASOPHILS # BLD AUTO: 0.04 THOUSANDS/ΜL (ref 0–0.1)
BASOPHILS NFR BLD AUTO: 1 % (ref 0–1)
BILIRUB SERPL-MCNC: 0.48 MG/DL (ref 0.2–1)
BUN SERPL-MCNC: 13 MG/DL (ref 5–25)
CALCIUM SERPL-MCNC: 8.9 MG/DL (ref 8.3–10.1)
CHLORIDE SERPL-SCNC: 108 MMOL/L (ref 100–108)
CHOLEST SERPL-MCNC: 94 MG/DL (ref 50–200)
CO2 SERPL-SCNC: 27 MMOL/L (ref 21–32)
CREAT SERPL-MCNC: 0.91 MG/DL (ref 0.6–1.3)
EOSINOPHIL # BLD AUTO: 0.31 THOUSAND/ΜL (ref 0–0.61)
EOSINOPHIL NFR BLD AUTO: 5 % (ref 0–6)
ERYTHROCYTE [DISTWIDTH] IN BLOOD BY AUTOMATED COUNT: 13 % (ref 11.6–15.1)
EST. AVERAGE GLUCOSE BLD GHB EST-MCNC: 108 MG/DL
GFR SERPL CREATININE-BSD FRML MDRD: 94 ML/MIN/1.73SQ M
GLUCOSE P FAST SERPL-MCNC: 97 MG/DL (ref 65–99)
HBA1C MFR BLD: 5.4 % (ref 4.2–6.3)
HCT VFR BLD AUTO: 42.2 % (ref 36.5–49.3)
HDLC SERPL-MCNC: 37 MG/DL (ref 40–60)
HGB BLD-MCNC: 13.8 G/DL (ref 12–17)
IMM GRANULOCYTES # BLD AUTO: 0.01 THOUSAND/UL (ref 0–0.2)
IMM GRANULOCYTES NFR BLD AUTO: 0 % (ref 0–2)
LDLC SERPL CALC-MCNC: 39 MG/DL (ref 0–100)
LYMPHOCYTES # BLD AUTO: 1.74 THOUSANDS/ΜL (ref 0.6–4.47)
LYMPHOCYTES NFR BLD AUTO: 26 % (ref 14–44)
MCH RBC QN AUTO: 30.9 PG (ref 26.8–34.3)
MCHC RBC AUTO-ENTMCNC: 32.7 G/DL (ref 31.4–37.4)
MCV RBC AUTO: 94 FL (ref 82–98)
MONOCYTES # BLD AUTO: 0.51 THOUSAND/ΜL (ref 0.17–1.22)
MONOCYTES NFR BLD AUTO: 8 % (ref 4–12)
NEUTROPHILS # BLD AUTO: 3.97 THOUSANDS/ΜL (ref 1.85–7.62)
NEUTS SEG NFR BLD AUTO: 60 % (ref 43–75)
NRBC BLD AUTO-RTO: 0 /100 WBCS
PLATELET # BLD AUTO: 223 THOUSANDS/UL (ref 149–390)
PMV BLD AUTO: 10.1 FL (ref 8.9–12.7)
POTASSIUM SERPL-SCNC: 4 MMOL/L (ref 3.5–5.3)
PROT SERPL-MCNC: 6.8 G/DL (ref 6.4–8.2)
RBC # BLD AUTO: 4.47 MILLION/UL (ref 3.88–5.62)
SODIUM SERPL-SCNC: 142 MMOL/L (ref 136–145)
TRIGL SERPL-MCNC: 91 MG/DL
WBC # BLD AUTO: 6.58 THOUSAND/UL (ref 4.31–10.16)

## 2018-07-24 PROCEDURE — 80061 LIPID PANEL: CPT

## 2018-07-24 PROCEDURE — 85025 COMPLETE CBC W/AUTO DIFF WBC: CPT

## 2018-07-24 PROCEDURE — 84153 ASSAY OF PSA TOTAL: CPT

## 2018-07-24 PROCEDURE — 83036 HEMOGLOBIN GLYCOSYLATED A1C: CPT

## 2018-07-24 PROCEDURE — 80053 COMPREHEN METABOLIC PANEL: CPT

## 2018-07-24 PROCEDURE — 36415 COLL VENOUS BLD VENIPUNCTURE: CPT

## 2018-07-25 LAB — PSA SERPL DL<=0.01 NG/ML-MCNC: 0.26 NG/ML (ref 0–4)

## 2018-08-06 DIAGNOSIS — F33.0 MILD EPISODE OF RECURRENT MAJOR DEPRESSIVE DISORDER (HCC): ICD-10-CM

## 2018-08-06 RX ORDER — BUPROPION HYDROCHLORIDE 300 MG/1
300 TABLET ORAL DAILY
Qty: 90 TABLET | Refills: 0
Start: 2018-08-06 | End: 2018-08-11 | Stop reason: SDUPTHER

## 2018-08-11 DIAGNOSIS — F33.0 MILD EPISODE OF RECURRENT MAJOR DEPRESSIVE DISORDER (HCC): ICD-10-CM

## 2018-08-11 RX ORDER — BUPROPION HYDROCHLORIDE 300 MG/1
300 TABLET ORAL DAILY
Qty: 90 TABLET | Refills: 0 | Status: SHIPPED | OUTPATIENT
Start: 2018-08-11 | End: 2018-11-19 | Stop reason: SDUPTHER

## 2018-08-11 NOTE — TELEPHONE ENCOUNTER
Can we make sure Wellburtrin went through?   Mail order saying they never received this prescription

## 2018-08-14 ENCOUNTER — OFFICE VISIT (OUTPATIENT)
Dept: CARDIOLOGY CLINIC | Facility: CLINIC | Age: 58
End: 2018-08-14
Payer: COMMERCIAL

## 2018-08-14 VITALS
BODY MASS INDEX: 36.03 KG/M2 | DIASTOLIC BLOOD PRESSURE: 78 MMHG | HEART RATE: 70 BPM | HEIGHT: 72 IN | OXYGEN SATURATION: 98 % | WEIGHT: 266 LBS | SYSTOLIC BLOOD PRESSURE: 122 MMHG

## 2018-08-14 DIAGNOSIS — I25.10 ARTERIOSCLEROTIC HEART DISEASE: ICD-10-CM

## 2018-08-14 DIAGNOSIS — R07.9 CHEST PAIN, UNSPECIFIED TYPE: Primary | ICD-10-CM

## 2018-08-14 DIAGNOSIS — E78.5 DYSLIPIDEMIA: ICD-10-CM

## 2018-08-14 PROCEDURE — 99214 OFFICE O/P EST MOD 30 MIN: CPT | Performed by: INTERNAL MEDICINE

## 2018-08-14 PROCEDURE — 93000 ELECTROCARDIOGRAM COMPLETE: CPT | Performed by: INTERNAL MEDICINE

## 2018-08-14 NOTE — PROGRESS NOTES
Maru Mcgarry II  1960  [id number]  [address] [facility] [address] [address]  [phone]    Interval History:  Maru Mcgarry II is a 66 y o  male who presents for routine coronary artery disease follow-up  In April of 2016, he underwent PCI to a 99% mRCA lesion after a STEMI  He was driving and felt tightness in the center part of his chest associated with diaphoresis  PCI done from right radial approach  Current symptoms: chest pain describes as a twitching feeling in the left side of chest after eating  Does not occur with exertion or stress    Aggravating factors: eating  Alleviating factors: none  Cardiac risk factors include advanced age (older than 54 for men, 72 for women), dyslipidemia, hypertension, male gender and obesity (BMI >= 30 kg/m2)  Past Medical History:   Diagnosis Date    Coronary artery disease     Depression     Dermoid cyst of face     resolved 04/19/2016    Hyperlipidemia     Myocardial infarction ([facility] [address] ) 04/15/2016    99% RCA block    Presenile dementia, depressed type     onst 11/03/2009, resolved 07/21/2016    Psychiatric disorder     depression     Past Surgical History:   Procedure Laterality Date    APPENDECTOMY      COLONOSCOPY N/A 12/14/2016    Procedure: COLONOSCOPY;  Surgeon: Kurtis Cruz MD;  Location: [place] [address] GI LAB; Service:     CORONARY ANGIOPLASTY WITH STENT PLACEMENT Right 04/15/2016    ESOPHAGOGASTRODUODENOSCOPY N/A 12/14/2016    Procedure: ESOPHAGOGASTRODUODENOSCOPY (EGD); Surgeon: Kurtis Cruz MD;  Location: [facility] GI LAB; Service:      Social History     Social History    Marital status: /Civil Union     Spouse name: N/A    Number of children: N/A    Years of education: N/A     Occupational History    Not on file       Social History Main Topics    Smoking status: Former Smoker     Packs/day: 0 50     Years: 15 00     Types: Cigarettes     Quit date: 4/15/2016    Smokeless tobacco: Never Used    Alcohol use Yes      Comment: 2-3 beers every cpl days    Drug use: Yes     Frequency: 2 0 times per week     Types: Marijuana      Comment: last time this a m   Sexual activity: Not on file     Other Topics Concern    Not on file     Social History Narrative    Lack of exercise    Pets/animals: Dog    Sleeps 8-10 hours a day                 Family History   Problem Relation Age of Onset    Arthritis Mother     Hypertension Mother     Hypertension Father     Heart disease Father     Coronary artery disease Father     Depression Father     Hypertension Brother     Cancer Maternal Grandfather        Current Outpatient Prescriptions:     aspirin 81 mg chewable tablet, Chew 81 mg daily, Disp: , Rfl:     buPROPion (WELLBUTRIN XL) 300 mg 24 hr tablet, Take 1 tablet (300 mg total) by mouth daily, Disp: 90 tablet, Rfl: 0    Multiple Vitamins-Minerals (PX MENS MULTIVITAMINS) TABS, Take 1 tablet by mouth daily, Disp: , Rfl:     nitroglycerin (NITROSTAT) 0 4 mg SL tablet, Place under the tongue, Disp: , Rfl:     rosuvastatin (CRESTOR) 20 MG tablet, Take 1 tablet (20 mg total) by mouth daily with dinner, Disp: 30 tablet, Rfl: 0  The following portions of the patient's history were reviewed and updated as appropriate: allergies, current medications, past family history, past medical history, past social history, past surgical history and problem list     Review of Systems:  Review of Systems   Constitutional: Negative for chills, fatigue and fever  HENT: Negative for congestion, nosebleeds and postnasal drip  Respiratory: Negative for cough, chest tightness and shortness of breath  Cardiovascular: Positive for chest pain  Negative for palpitations and leg swelling  Gastrointestinal: Negative for abdominal distention, abdominal pain, diarrhea, nausea and vomiting  Endocrine: Negative for polydipsia, polyphagia and polyuria     Musculoskeletal: Negative for gait problem and myalgias  Skin: Negative for color change, pallor and rash  Allergic/Immunologic: Negative for environmental allergies, food allergies and immunocompromised state  Neurological: Negative for dizziness, seizures, syncope and light-headedness  Hematological: Negative for adenopathy  Does not bruise/bleed easily  Psychiatric/Behavioral: Negative for dysphoric mood  The patient is not nervous/anxious  Physical Exam:  /78 (BP Location: Left arm, Patient Position: Sitting, Cuff Size: Standard)   Pulse 70   Ht 6' (1 829 m)   Wt 121 kg (266 lb)   SpO2 98%   BMI 36 08 kg/m²     Physical Exam   Constitutional: He is oriented to person, place, and time  He appears well-developed  No distress  HENT:   Head: Normocephalic and atraumatic  Eyes: Conjunctivae and EOM are normal  Pupils are equal, round, and reactive to light  Neck: Neck supple  No JVD present  No thyromegaly present  Cardiovascular: Normal rate, regular rhythm, normal heart sounds and intact distal pulses  Exam reveals no gallop and no friction rub  No murmur heard  Pulmonary/Chest: Effort normal and breath sounds normal    Abdominal: Soft  He exhibits no distension  There is no tenderness  Musculoskeletal: He exhibits no edema  Neurological: He is alert and oriented to person, place, and time  No cranial nerve deficit  Skin: Skin is warm and dry  No rash noted  He is not diaphoretic  No erythema  Psychiatric: He has a normal mood and affect  His behavior is normal  Judgment and thought content normal        Cardiographics  ECG: normal sinus rhythm  LV Ejection Fraction: LV ejection fraction >= 40%  Imaging:No results found      Lab Review  Lab Results   Component Value Date    CHOL 94 07/24/2018    CHOL 167 04/24/2017    CHOL 152 07/11/2016    CHOL 190 08/28/2015    CHOL 174 08/27/2014    TRIG 91 07/24/2018    TRIG 109 04/24/2017    TRIG 233 (H) 07/11/2016    TRIG 153 08/28/2015 TRIG 142 08/27/2014    HDL 37 (L) 07/24/2018    HDL 41 04/24/2017    HDL 33 (L) 07/11/2016    HDL 32 08/28/2015    HDL 39 08/27/2014     Lab Results   Component Value Date     07/24/2018    K 4 0 07/24/2018     07/24/2018    CO2 27 07/24/2018    BUN 13 07/24/2018    CREATININE 0 91 07/24/2018    GLUCOSE 104 11/13/2017    CALCIUM 8 9 07/24/2018     Lab Results   Component Value Date    WBC 6 58 07/24/2018    HGB 13 8 07/24/2018    HCT 42 2 07/24/2018    MCV 94 07/24/2018     07/24/2018     Lab Results   Component Value Date    CHOL 94 07/24/2018    CHOL 190 08/28/2015    TRIG 91 07/24/2018    TRIG 153 08/28/2015    HDL 37 (L) 07/24/2018    HDL 32 08/28/2015     Assessment/Plan     1  Chest pain, unspecified type    2  Arteriosclerotic heart disease    3  Dyslipidemia        Coronary artery disease, well controlled  Lab results and schedule of future lab studies reviewed with patient  Reviewed diet, exercise and weight control  Cardiovascular risk and specific lipid/LDL goals reviewed  Reviewed medications and side effects in detail  Use of aspirin to prevent MI and TIA's discussed    Given chest pain and anxiety to exercise more, will obtain treadmill stress test

## 2018-08-30 ENCOUNTER — HOSPITAL ENCOUNTER (OUTPATIENT)
Dept: NON INVASIVE DIAGNOSTICS | Facility: HOSPITAL | Age: 58
Discharge: HOME | End: 2018-08-30
Attending: INTERNAL MEDICINE
Payer: COMMERCIAL

## 2018-08-30 DIAGNOSIS — R07.9 CHEST PAIN, UNSPECIFIED TYPE: ICD-10-CM

## 2018-08-30 LAB
CHEST PAIN STATEMENT: NORMAL
MAX DIASTOLIC BP: 80 MMHG
MAX HEART RATE: 129 BPM
MAX PREDICTED HEART RATE: 164 BPM
MAX. SYSTOLIC BP: 180 MMHG
PROTOCOL NAME: NORMAL
TARGET HR FORMULA: NORMAL
TEST INDICATION: NORMAL
TIME IN EXERCISE PHASE: NORMAL

## 2018-08-30 PROCEDURE — 93017 CV STRESS TEST TRACING ONLY: CPT

## 2018-08-31 PROCEDURE — 93016 CV STRESS TEST SUPVJ ONLY: CPT | Performed by: INTERNAL MEDICINE

## 2018-08-31 PROCEDURE — 93018 CV STRESS TEST I&R ONLY: CPT | Performed by: INTERNAL MEDICINE

## 2018-09-03 DIAGNOSIS — I25.10 ARTERIOSCLEROTIC HEART DISEASE: ICD-10-CM

## 2018-09-03 DIAGNOSIS — I21.11 ST ELEVATION (STEMI) MYOCARDIAL INFARCTION INVOLVING RIGHT CORONARY ARTERY (HCC): ICD-10-CM

## 2018-09-03 RX ORDER — ROSUVASTATIN CALCIUM 20 MG/1
TABLET, COATED ORAL
Qty: 90 TABLET | Refills: 2 | Status: SHIPPED | OUTPATIENT
Start: 2018-09-03 | End: 2018-09-05 | Stop reason: SDUPTHER

## 2018-09-05 ENCOUNTER — TELEPHONE (OUTPATIENT)
Dept: CARDIOLOGY CLINIC | Facility: CLINIC | Age: 58
End: 2018-09-05

## 2018-09-05 DIAGNOSIS — I25.10 ARTERIOSCLEROTIC HEART DISEASE: ICD-10-CM

## 2018-09-05 DIAGNOSIS — I21.11 ST ELEVATION (STEMI) MYOCARDIAL INFARCTION INVOLVING RIGHT CORONARY ARTERY (HCC): ICD-10-CM

## 2018-09-05 RX ORDER — ROSUVASTATIN CALCIUM 20 MG/1
20 TABLET, COATED ORAL DAILY
Qty: 90 TABLET | Refills: 3 | Status: SHIPPED | OUTPATIENT
Start: 2018-09-05 | End: 2019-09-29 | Stop reason: SDUPTHER

## 2018-09-06 ENCOUNTER — OFFICE VISIT (OUTPATIENT)
Dept: FAMILY MEDICINE CLINIC | Facility: CLINIC | Age: 58
End: 2018-09-06
Payer: COMMERCIAL

## 2018-09-06 VITALS
RESPIRATION RATE: 20 BRPM | DIASTOLIC BLOOD PRESSURE: 78 MMHG | BODY MASS INDEX: 35.97 KG/M2 | HEART RATE: 56 BPM | HEIGHT: 72 IN | TEMPERATURE: 97.7 F | WEIGHT: 265.6 LBS | SYSTOLIC BLOOD PRESSURE: 128 MMHG

## 2018-09-06 DIAGNOSIS — H61.23 BILATERAL IMPACTED CERUMEN: ICD-10-CM

## 2018-09-06 DIAGNOSIS — I25.10 ARTERIOSCLEROTIC HEART DISEASE: ICD-10-CM

## 2018-09-06 DIAGNOSIS — H60.332 ACUTE SWIMMER'S EAR OF LEFT SIDE: Primary | ICD-10-CM

## 2018-09-06 DIAGNOSIS — E78.5 DYSLIPIDEMIA: ICD-10-CM

## 2018-09-06 PROBLEM — R07.9 CHEST PAIN: Status: RESOLVED | Noted: 2018-08-14 | Resolved: 2018-09-06

## 2018-09-06 PROCEDURE — 99214 OFFICE O/P EST MOD 30 MIN: CPT | Performed by: FAMILY MEDICINE

## 2018-09-06 PROCEDURE — 69209 REMOVE IMPACTED EAR WAX UNI: CPT | Performed by: FAMILY MEDICINE

## 2018-09-06 RX ORDER — OFLOXACIN 3 MG/ML
5 SOLUTION AURICULAR (OTIC) 2 TIMES DAILY
Qty: 5 ML | Refills: 0 | Status: SHIPPED | OUTPATIENT
Start: 2018-09-06 | End: 2018-09-13

## 2018-09-06 NOTE — PROGRESS NOTES
Assessment/Plan:    No problem-specific Assessment & Plan notes found for this encounter  B/l flush done and tolerated well  Left ear hearing improved but canal was red and swollen  CAD stable  Use of statins for the purposes of CVD/CVA risks reduction was advised according to USPSTF guidelines along with appropriate continued liver monitoring  Chol stable, LDL 39  OE new  Prevention of wax discussed, could also flush early summer yearly to prevent water trapping in future     Diagnoses and all orders for this visit:    Acute swimmer's ear of left side  -     ofloxacin (FLOXIN) 0 3 % otic solution; Administer 5 drops into the left ear 2 (two) times a day for 7 days    Bilateral impacted cerumen  -     Ear cerumen removal    Arteriosclerotic heart disease    Dyslipidemia              No Follow-up on file  Subjective:      Patient ID: Lucy Hinds is a 66 y o  male  Chief Complaint   Patient presents with    fluid in left  ear      for about a week and can not hear, pt states feels as if it is working into his right  ear and feels off balance  af/rma        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, gradually  mucinex w/o help  Pain at times  Sharp in nature  Episodic  No triggers  Tried a qtip  No f/c/uri sx  Hx of ear wax issues, no home tx tried    CAD stable, hx stent, no cp  No myalgias on statin, FLP good    The following portions of the patient's history were reviewed and updated as appropriate: allergies, current medications, past family history, past medical history, past social history, past surgical history and problem list     Review of Systems   Constitutional: Negative for chills and fever  Cardiovascular: Negative for chest pain  Neurological: Negative for dizziness           Current Outpatient Prescriptions   Medication Sig Dispense Refill    aspirin 81 mg chewable tablet Chew 81 mg daily      buPROPion (WELLBUTRIN XL) 300 mg 24 hr tablet Take 1 tablet (300 mg total) by mouth daily 90 tablet 0  Multiple Vitamins-Minerals (PX MENS MULTIVITAMINS) TABS Take 1 tablet by mouth daily      nitroglycerin (NITROSTAT) 0 4 mg SL tablet Place under the tongue      rosuvastatin (CRESTOR) 20 MG tablet Take 1 tablet (20 mg total) by mouth daily 90 tablet 3    ofloxacin (FLOXIN) 0 3 % otic solution Administer 5 drops into the left ear 2 (two) times a day for 7 days 5 mL 0     No current facility-administered medications for this visit  Objective:    /78   Pulse 56   Temp 97 7 °F (36 5 °C)   Resp 20   Ht 6' (1 829 m)   Wt 120 kg (265 lb 9 6 oz)   BMI 36 02 kg/m²        Physical Exam   Constitutional: He appears well-developed  HENT:   Head: Normocephalic  Mouth/Throat: No oropharyngeal exudate  Left sided impaction , right sided accumulation of cermen, left canal red/swollen   Eyes: Conjunctivae are normal  Right eye exhibits no discharge  Left eye exhibits no discharge  No scleral icterus  Neck: Neck supple  Cardiovascular: Normal rate and intact distal pulses  Pulmonary/Chest: Effort normal  No respiratory distress  He has no wheezes  Abdominal: Soft  He exhibits no mass  There is no rebound  Musculoskeletal: He exhibits no edema or deformity  Lymphadenopathy:     He has no cervical adenopathy  Neurological: He is alert  Skin: Skin is warm and dry  No rash noted  No pallor  Psychiatric: His behavior is normal  Thought content normal    Nursing note and vitals reviewed        Ear cerumen removal  Date/Time: 9/6/2018 11:56 AM  Performed by: Leandro Christian by: Vince Strange     Patient location:  Clinic  Other Assisting Provider: No    Consent:     Consent obtained:  Verbal    Consent given by:  Patient    Risks discussed:  Bleeding, infection, pain, incomplete removal and dizziness    Alternatives discussed:  No treatment and delayed treatment  Universal protocol:     Procedure explained and questions answered to patient or proxy's satisfaction: yes Patient identity confirmed:  Verbally with patient  Procedure details:     Local anesthetic:  None    Location:  L ear and R ear    Procedure type: irrigation      Approach:  External  Post-procedure details:     Complication:  None    Hearing quality:  Improved    Patient tolerance of procedure:   Tolerated well, no immediate complications           Emily Limon DO

## 2018-09-10 ENCOUNTER — TELEPHONE (OUTPATIENT)
Dept: CARDIOLOGY CLINIC | Facility: CLINIC | Age: 58
End: 2018-09-10

## 2018-09-10 NOTE — TELEPHONE ENCOUNTER
----- Message from Kuldip Zamora DO sent at 9/7/2018  8:18 PM EDT -----  Can you please let the patient know test was normal and they should follow up in 6 months?

## 2018-09-18 ENCOUNTER — HOSPITAL ENCOUNTER (OUTPATIENT)
Dept: RADIOLOGY | Facility: HOSPITAL | Age: 58
Discharge: HOME | End: 2018-09-18
Attending: FAMILY MEDICINE
Payer: COMMERCIAL

## 2018-09-18 ENCOUNTER — OFFICE VISIT (OUTPATIENT)
Dept: FAMILY MEDICINE CLINIC | Facility: CLINIC | Age: 58
End: 2018-09-18
Payer: COMMERCIAL

## 2018-09-18 VITALS
RESPIRATION RATE: 16 BRPM | BODY MASS INDEX: 36.3 KG/M2 | SYSTOLIC BLOOD PRESSURE: 122 MMHG | HEIGHT: 72 IN | DIASTOLIC BLOOD PRESSURE: 80 MMHG | TEMPERATURE: 96.5 F | HEART RATE: 68 BPM | WEIGHT: 268 LBS

## 2018-09-18 DIAGNOSIS — R59.1 LYMPHADENOPATHY: ICD-10-CM

## 2018-09-18 DIAGNOSIS — H60.332 ACUTE SWIMMER'S EAR OF LEFT SIDE: ICD-10-CM

## 2018-09-18 DIAGNOSIS — H60.332 ACUTE SWIMMER'S EAR OF LEFT SIDE: Primary | ICD-10-CM

## 2018-09-18 PROCEDURE — 99214 OFFICE O/P EST MOD 30 MIN: CPT | Performed by: FAMILY MEDICINE

## 2018-09-18 PROCEDURE — 70480 CT ORBIT/EAR/FOSSA W/O DYE: CPT

## 2018-09-18 PROCEDURE — 1036F TOBACCO NON-USER: CPT | Performed by: FAMILY MEDICINE

## 2018-09-18 PROCEDURE — 3008F BODY MASS INDEX DOCD: CPT | Performed by: FAMILY MEDICINE

## 2018-09-18 RX ORDER — LEVOFLOXACIN 750 MG/1
750 TABLET ORAL EVERY 24 HOURS
Qty: 10 TABLET | Refills: 0 | Status: SHIPPED | OUTPATIENT
Start: 2018-09-18 | End: 2018-09-28

## 2018-09-18 NOTE — PROGRESS NOTES
Assessment/Plan:    Problem List Items Addressed This Visit     Acute swimmer's ear of left side - Primary    Relevant Medications    levofloxacin (LEVAQUIN) 750 mg tablet    Other Relevant Orders    CT soft tissue neck wo contrast      pt sent over for a stat ct scan of the head to R/O acute mastouditis  I will send levaquin to pharm - pt is allergic to pcn  Will follow results,  Called radiology for proper study    There are no Patient Instructions on file for this visit  No Follow-up on file  Subjective:      Patient ID: Gayathri Stoner is a 66 y o  male  Chief Complaint   Patient presents with    Earache     C/O left earache which started 2 weeks ago  No fever Deboe EDNAN       Pt is here with pain in left ear  Was here a week an a half ago  Been about 12 days  Was given an abx ear drop        The following portions of the patient's history were reviewed and updated as appropriate: allergies, current medications, past family history, past medical history, past social history, past surgical history and problem list     Review of Systems   Constitutional: Negative for activity change, appetite change, chills, diaphoresis, fatigue, fever and unexpected weight change  HENT: Positive for ear pain  Negative for congestion, dental problem, mouth sores, sinus pain, sinus pressure, sore throat and trouble swallowing  Eyes: Negative for photophobia, discharge and itching  Respiratory: Negative for apnea, chest tightness and shortness of breath  Cardiovascular: Negative for chest pain, palpitations and leg swelling  Gastrointestinal: Negative for abdominal distention, abdominal pain, blood in stool, nausea and vomiting  Endocrine: Negative for cold intolerance, heat intolerance, polydipsia, polyphagia and polyuria  Genitourinary: Negative for difficulty urinating  Musculoskeletal: Positive for neck pain  Negative for arthralgias  Skin: Negative for color change and wound  Neurological: Negative for dizziness, syncope, speech difficulty and headaches  Hematological: Negative for adenopathy  Psychiatric/Behavioral: Negative for agitation and behavioral problems  Current Outpatient Prescriptions   Medication Sig Dispense Refill    aspirin 81 mg chewable tablet Chew 81 mg daily      buPROPion (WELLBUTRIN XL) 300 mg 24 hr tablet Take 1 tablet (300 mg total) by mouth daily 90 tablet 0    Multiple Vitamins-Minerals (PX MENS MULTIVITAMINS) TABS Take 1 tablet by mouth daily      nitroglycerin (NITROSTAT) 0 4 mg SL tablet Place under the tongue      rosuvastatin (CRESTOR) 20 MG tablet Take 1 tablet (20 mg total) by mouth daily 90 tablet 3    levofloxacin (LEVAQUIN) 750 mg tablet Take 1 tablet (750 mg total) by mouth every 24 hours for 10 days 10 tablet 0     No current facility-administered medications for this visit  Objective:    /80   Pulse 68   Temp (!) 96 5 °F (35 8 °C)   Resp 16   Ht 6' (1 829 m)   Wt 122 kg (268 lb)   BMI 36 35 kg/m²        Physical Exam   Constitutional: He appears well-developed and well-nourished  No distress  HENT:   Head: Normocephalic and atraumatic  Right Ear: External ear normal    Left Ear: There is swelling and tenderness  There is mastoid tenderness  Tympanic membrane is erythematous and bulging  Decreased hearing is noted  Nose: Nose normal    Mouth/Throat: Oropharynx is clear and moist  No oropharyngeal exudate  Eyes: EOM are normal  Pupils are equal, round, and reactive to light  Right eye exhibits no discharge  Left eye exhibits no discharge  No scleral icterus  Neck: No thyromegaly present  Cardiovascular: Normal rate and normal heart sounds  No murmur heard  Pulmonary/Chest: Effort normal and breath sounds normal  No respiratory distress  He has no wheezes  Abdominal: Soft  Bowel sounds are normal  He exhibits no distension and no mass  There is no tenderness  There is no rebound and no guarding  Musculoskeletal: Normal range of motion  Lymphadenopathy:     He has cervical adenopathy  Neurological: He is alert  He displays normal reflexes  Coordination normal    Skin: Skin is warm and dry  No rash noted  He is not diaphoretic  No erythema  Psychiatric: He has a normal mood and affect  His behavior is normal    Nursing note and vitals reviewed               Latonya Morgan DO

## 2018-11-19 DIAGNOSIS — F33.0 MILD EPISODE OF RECURRENT MAJOR DEPRESSIVE DISORDER (HCC): ICD-10-CM

## 2018-11-19 RX ORDER — BUPROPION HYDROCHLORIDE 300 MG/1
300 TABLET ORAL DAILY
Qty: 90 TABLET | Refills: 0 | Status: SHIPPED | OUTPATIENT
Start: 2018-11-19 | End: 2019-02-23 | Stop reason: SDUPTHER

## 2018-11-19 NOTE — TELEPHONE ENCOUNTER
Wife is calling to get patient's bupropion refilled  Can you advise since Dr Jenny Mcclelland is out of office ?  Rick Bruno MA

## 2019-02-07 ENCOUNTER — OFFICE VISIT (OUTPATIENT)
Dept: CARDIOLOGY CLINIC | Facility: CLINIC | Age: 59
End: 2019-02-07
Payer: COMMERCIAL

## 2019-02-07 VITALS
OXYGEN SATURATION: 97 % | WEIGHT: 277 LBS | HEART RATE: 63 BPM | DIASTOLIC BLOOD PRESSURE: 74 MMHG | BODY MASS INDEX: 37.52 KG/M2 | HEIGHT: 72 IN | SYSTOLIC BLOOD PRESSURE: 122 MMHG

## 2019-02-07 DIAGNOSIS — E78.5 DYSLIPIDEMIA: ICD-10-CM

## 2019-02-07 DIAGNOSIS — I25.10 CAD IN NATIVE ARTERY: Primary | ICD-10-CM

## 2019-02-07 DIAGNOSIS — I25.2 HISTORY OF ST ELEVATION MYOCARDIAL INFARCTION (STEMI): ICD-10-CM

## 2019-02-07 DIAGNOSIS — R07.9 CHEST PAIN, UNSPECIFIED TYPE: ICD-10-CM

## 2019-02-07 DIAGNOSIS — I25.10 ARTERIOSCLEROTIC HEART DISEASE: ICD-10-CM

## 2019-02-07 DIAGNOSIS — N52.01 ERECTILE DYSFUNCTION DUE TO ARTERIAL INSUFFICIENCY: ICD-10-CM

## 2019-02-07 PROCEDURE — 99214 OFFICE O/P EST MOD 30 MIN: CPT | Performed by: INTERNAL MEDICINE

## 2019-02-07 NOTE — PROGRESS NOTES
Chay Powell II  1960  [id number]  [address] [facility] [address] [address]  [phone]    Interval History:  Chay Powell II is a 66 y o  male who presents for routine coronary artery disease follow-up  In April of 2016, he underwent PCI to a 99% mRCA lesion after a STEMI  He was driving and felt tightness in the center part of his chest associated with diaphoresis  PCI done from right radial approach  Current symptoms:A pinching sensation in the left side of his chest that improved with advil  He had a treadmill stress test last year which was normal at 80% MAPHR  He is concerned about erectile dysfunction as he has difficulties achieving and maintaining an erection  Cardiac risk factors include advanced age (older than 54 for men, 72 for women), dyslipidemia, hypertension, male gender and obesity (BMI >= 30 kg/m2)  Past Medical History:   Diagnosis Date    Coronary artery disease     Depression     Dermoid cyst of face     resolved 04/19/2016    Hyperlipidemia     Myocardial infarction ([facility] [address] ) 04/15/2016    99% RCA block    Presenile dementia, depressed type     onst 11/03/2009, resolved 07/21/2016    Psychiatric disorder     depression     Past Surgical History:   Procedure Laterality Date    APPENDECTOMY      COLONOSCOPY N/A 12/14/2016    Procedure: COLONOSCOPY;  Surgeon: Griselda Bernal MD;  Location: [facility]; Service:     CORONARY ANGIOPLASTY WITH STENT PLACEMENT Right 04/15/2016    ESOPHAGOGASTRODUODENOSCOPY N/A 12/14/2016    Procedure: ESOPHAGOGASTRODUODENOSCOPY (EGD); Surgeon: Griselda Bernal MD;  Location: [facility] GI LAB; Service:      Social History     Social History    Marital status: /Civil Union     Spouse name: N/A    Number of children: N/A    Years of education: N/A     Occupational History    Not on file       Social History Main Topics    Smoking status: Former Smoker Packs/day: 0 50     Years: 15 00     Types: Cigarettes     Quit date: 4/15/2016    Smokeless tobacco: Never Used    Alcohol use Yes      Comment: 2-3 beers every cpl days    Drug use: Yes     Frequency: 2 0 times per week     Types: Marijuana      Comment: last time this a m   Sexual activity: Not on file     Other Topics Concern    Not on file     Social History Narrative    Lack of exercise    Pets/animals: Dog    Sleeps 8-10 hours a day                 Family History   Problem Relation Age of Onset    Arthritis Mother     Hypertension Mother     Hypertension Father     Heart disease Father     Coronary artery disease Father     Depression Father     Hypertension Brother     Cancer Maternal Grandfather        Current Outpatient Prescriptions:     aspirin 81 mg chewable tablet, Chew 81 mg daily, Disp: , Rfl:     buPROPion (WELLBUTRIN XL) 300 mg 24 hr tablet, Take 1 tablet (300 mg total) by mouth daily, Disp: 90 tablet, Rfl: 0    Multiple Vitamins-Minerals (PX MENS MULTIVITAMINS) TABS, Take 1 tablet by mouth daily, Disp: , Rfl:     nitroglycerin (NITROSTAT) 0 4 mg SL tablet, Place under the tongue, Disp: , Rfl:     rosuvastatin (CRESTOR) 20 MG tablet, Take 1 tablet (20 mg total) by mouth daily, Disp: 90 tablet, Rfl: 3  The following portions of the patient's history were reviewed and updated as appropriate: allergies, current medications, past family history, past medical history, past social history, past surgical history and problem list     Review of Systems:  Review of Systems   Constitutional: Negative for chills, fatigue and fever  HENT: Negative for congestion, nosebleeds and postnasal drip  Respiratory: Negative for cough, chest tightness and shortness of breath  Cardiovascular: Positive for chest pain  Negative for palpitations and leg swelling  Gastrointestinal: Negative for abdominal distention, abdominal pain, diarrhea, nausea and vomiting     Endocrine: Negative for polydipsia, polyphagia and polyuria  Musculoskeletal: Negative for gait problem and myalgias  Skin: Negative for color change, pallor and rash  Allergic/Immunologic: Negative for environmental allergies, food allergies and immunocompromised state  Neurological: Negative for dizziness, seizures, syncope and light-headedness  Hematological: Negative for adenopathy  Does not bruise/bleed easily  Psychiatric/Behavioral: Positive for sleep disturbance  Negative for dysphoric mood  The patient is not nervous/anxious  Physical Exam:  /74 (BP Location: Left arm, Patient Position: Sitting, Cuff Size: Large)   Pulse 63 Comment: apical  Ht 6' (1 829 m)   Wt 126 kg (277 lb)   SpO2 97%   BMI 37 57 kg/m²     Physical Exam   Constitutional: He is oriented to person, place, and time  He appears well-developed  No distress  HENT:   Head: Normocephalic and atraumatic  Eyes: Pupils are equal, round, and reactive to light  Conjunctivae and EOM are normal    Neck: Neck supple  No JVD present  No thyromegaly present  Cardiovascular: Normal rate and regular rhythm  Exam reveals no gallop and no friction rub  No murmur heard  Pulmonary/Chest: Effort normal and breath sounds normal    Abdominal: Soft  He exhibits no distension  There is no tenderness  Musculoskeletal: He exhibits no edema  Neurological: He is alert and oriented to person, place, and time  No cranial nerve deficit  Skin: Skin is warm and dry  No rash noted  He is not diaphoretic  No erythema  Psychiatric: He has a normal mood and affect  His behavior is normal  Judgment and thought content normal        Cardiographics  ECG: normal sinus rhythm  LV Ejection Fraction: LV ejection fraction >= 40%  Imaging:No results found      Lab Review  Lab Results   Component Value Date    CHOL 190 08/28/2015    CHOL 174 08/27/2014    TRIG 91 07/24/2018    TRIG 109 04/24/2017    TRIG 233 (H) 07/11/2016    TRIG 153 08/28/2015    TRIG 142 08/27/2014    HDL 37 (L) 07/24/2018    HDL 41 04/24/2017    HDL 33 (L) 07/11/2016    HDL 32 08/28/2015    HDL 39 08/27/2014     Lab Results   Component Value Date    K 4 0 07/24/2018     07/24/2018    CO2 27 07/24/2018    BUN 13 07/24/2018    CREATININE 0 91 07/24/2018    CALCIUM 8 9 07/24/2018     Lab Results   Component Value Date    WBC 6 58 07/24/2018    HGB 13 8 07/24/2018    HCT 42 2 07/24/2018    MCV 94 07/24/2018     07/24/2018     Lab Results   Component Value Date    CHOL 190 08/28/2015    TRIG 91 07/24/2018    TRIG 153 08/28/2015    HDL 37 (L) 07/24/2018    HDL 32 08/28/2015     Assessment/Plan     1  CAD in native artery    2  History of ST elevation myocardial infarction (STEMI)    3  Chest pain, unspecified type    4  Arteriosclerotic heart disease    5  Dyslipidemia    6  Erectile dysfunction due to arterial insufficiency        Coronary artery disease, well controlled  Continue ASA and Rosuvastatin  Lab results and schedule of future lab studies reviewed with patient  Reviewed diet, exercise and weight control  Cardiovascular risk and specific lipid/LDL goals reviewed  Reviewed medications and side effects in detail  Use of aspirin to prevent MI and TIA's discussed  Rx  Given for Viagra along with instructions for usage

## 2019-02-08 PROCEDURE — 93000 ELECTROCARDIOGRAM COMPLETE: CPT | Performed by: INTERNAL MEDICINE

## 2019-02-15 ENCOUNTER — HOSPITAL ENCOUNTER (OUTPATIENT)
Dept: RADIOLOGY | Facility: HOSPITAL | Age: 59
Discharge: HOME | End: 2019-02-15
Attending: FAMILY MEDICINE
Payer: COMMERCIAL

## 2019-02-15 ENCOUNTER — OFFICE VISIT (OUTPATIENT)
Dept: FAMILY MEDICINE CLINIC | Facility: CLINIC | Age: 59
End: 2019-02-15
Payer: COMMERCIAL

## 2019-02-15 VITALS
BODY MASS INDEX: 37.55 KG/M2 | DIASTOLIC BLOOD PRESSURE: 77 MMHG | WEIGHT: 277.2 LBS | HEIGHT: 72 IN | TEMPERATURE: 98.5 F | RESPIRATION RATE: 15 BRPM | SYSTOLIC BLOOD PRESSURE: 118 MMHG | HEART RATE: 66 BPM

## 2019-02-15 DIAGNOSIS — I83.12 VARICOSE VEINS OF BOTH LOWER EXTREMITIES WITH INFLAMMATION: ICD-10-CM

## 2019-02-15 DIAGNOSIS — I25.10 ARTERIOSCLEROTIC HEART DISEASE: ICD-10-CM

## 2019-02-15 DIAGNOSIS — E78.2 MIXED HYPERLIPIDEMIA: ICD-10-CM

## 2019-02-15 DIAGNOSIS — R60.0 LEG EDEMA, RIGHT: ICD-10-CM

## 2019-02-15 DIAGNOSIS — E66.01 SEVERE OBESITY (BMI 35.0-39.9) WITH COMORBIDITY (HCC): ICD-10-CM

## 2019-02-15 DIAGNOSIS — I83.11 VARICOSE VEINS OF BOTH LOWER EXTREMITIES WITH INFLAMMATION: ICD-10-CM

## 2019-02-15 DIAGNOSIS — M79.604 LOWER EXTREMITY PAIN, ANTERIOR, RIGHT: Primary | ICD-10-CM

## 2019-02-15 DIAGNOSIS — M79.604 LOWER EXTREMITY PAIN, ANTERIOR, RIGHT: ICD-10-CM

## 2019-02-15 PROBLEM — H61.23 BILATERAL IMPACTED CERUMEN: Status: RESOLVED | Noted: 2018-09-06 | Resolved: 2019-02-15

## 2019-02-15 PROBLEM — H60.332 ACUTE SWIMMER'S EAR OF LEFT SIDE: Status: RESOLVED | Noted: 2018-09-06 | Resolved: 2019-02-15

## 2019-02-15 PROCEDURE — 93971 EXTREMITY STUDY: CPT

## 2019-02-15 PROCEDURE — 99214 OFFICE O/P EST MOD 30 MIN: CPT | Performed by: FAMILY MEDICINE

## 2019-02-15 PROCEDURE — 1036F TOBACCO NON-USER: CPT | Performed by: FAMILY MEDICINE

## 2019-02-15 PROCEDURE — 3008F BODY MASS INDEX DOCD: CPT | Performed by: FAMILY MEDICINE

## 2019-02-15 RX ORDER — SULFAMETHOXAZOLE AND TRIMETHOPRIM 800; 160 MG/1; MG/1
1 TABLET ORAL EVERY 12 HOURS SCHEDULED
Qty: 20 TABLET | Refills: 0 | Status: SHIPPED | OUTPATIENT
Start: 2019-02-15 | End: 2019-02-25

## 2019-02-15 NOTE — PROGRESS NOTES
Assessment/Plan:    Problem List Items Addressed This Visit        Cardiovascular and Mediastinum    Arteriosclerotic heart disease       Other    Mixed hyperlipidemia    Relevant Orders    Comprehensive metabolic panel    Lipid Panel with Direct LDL reflex    Severe obesity (BMI 35 0-39  9) with comorbidity (HCC)    BMI 37 0-37 9, adult      Other Visit Diagnoses     Lower extremity pain, anterior, right    -  Primary    Relevant Medications    sulfamethoxazole-trimethoprim (BACTRIM DS) 800-160 mg per tablet    Other Relevant Orders    VAS lower limb venous duplex study, unilateral/limited    Leg edema, right        Relevant Orders    VAS lower limb venous duplex study, unilateral/limited    Varicose veins of both lower extremities with inflammation        Relevant Orders    VAS lower limb venous duplex study, unilateral/limited      pt to be sent for a stat duplex, could be a high dvt, could be thrombophlebitis, could be cellulitis  abx sent tp pharm in case  Will follow stat results    BMI Counseling: Body mass index is 37 6 kg/m²  Discussed the patient's BMI with him  The BMI is above average  BMI counseling and education was provided to the patient  Nutrition recommendations include reducing portion sizes  Patient Instructions     Obesity   AMBULATORY CARE:   Obesity  is when your body mass index (BMI) is greater than 30  Your healthcare provider will use your height and weight to measure your BMI  The risks of obesity include  many health problems, such as injuries or physical disability  You may need tests to check for the following:  · Diabetes     · High blood pressure or high cholesterol     · Heart disease     · Gallbladder or liver disease     · Cancer of the colon, breast, prostate, liver, or kidney     · Sleep apnea     · Arthritis or gout  Seek care immediately if:   · You have a severe headache, confusion, or difficulty speaking  · You have weakness on one side of your body       · You have chest pain, sweating, or shortness of breath  Contact your healthcare provider if:   · You have symptoms of gallbladder or liver disease, such as pain in your upper abdomen  · You have knee or hip pain and discomfort while walking  · You have symptoms of diabetes, such as intense hunger and thirst, and frequent urination  · You have symptoms of sleep apnea, such as snoring or daytime sleepiness  · You have questions or concerns about your condition or care  Treatment for obesity  focuses on helping you lose weight to improve your health  Even a small decrease in BMI can reduce the risk for many health problems  Your healthcare provider will help you set a weight-loss goal   · Lifestyle changes  are the first step in treating obesity  These include making healthy food choices and getting regular physical activity  Your healthcare provider may suggest a weight-loss program that involves coaching, education, and therapy  · Medicine  may help you lose weight when it is used with a healthy diet and physical activity  · Surgery  can help you lose weight if you are very obese and have other health problems  There are several types of weight-loss surgery  Ask your healthcare provider for more information  Be successful losing weight:   · Set small, realistic goals  An example of a small goal is to walk for 20 minutes 5 days a week  Anther goal is to lose 5% of your body weight  · Tell friends, family members, and coworkers about your goals  and ask for their support  Ask a friend to lose weight with you, or join a weight-loss support group  · Identify foods or triggers that may cause you to overeat , and find ways to avoid them  Remove tempting high-calorie foods from your home and workplace  Place a bowl of fresh fruit on your kitchen counter  If stress causes you to eat, then find other ways to cope with stress  · Keep a diary to track what you eat and drink    Also write down how many minutes of physical activity you do each day  Weigh yourself once a week and record it in your diary  Eating changes: You will need to eat 500 to 1,000 fewer calories each day than you currently eat to lose 1 to 2 pounds a week  The following changes will help you cut calories:  · Eat smaller portions  Use small plates, no larger than 9 inches in diameter  Fill your plate half full of fruits and vegetables  Measure your food using measuring cups until you know what a serving size looks like  · Eat 3 meals and 1 or 2 snacks each day  Plan your meals in advance  Sarah Oliva and eat at home most of the time  Eat slowly  · Eat fruits and vegetables at every meal   They are low in calories and high in fiber, which makes you feel full  Do not add butter, margarine, or cream sauce to vegetables  Use herbs to season steamed vegetables  · Eat less fat and fewer fried foods  Eat more baked or grilled chicken and fish  These protein sources are lower in calories and fat than red meat  Limit fast food  Dress your salads with olive oil and vinegar instead of bottled dressing  · Limit the amount of sugar you eat  Do not drink sugary beverages  Limit alcohol  Activity changes:  Physical activity is good for your body in many ways  It helps you burn calories and build strong muscles  It decreases stress and depression, and improves your mood  It can also help you sleep better  Talk to your healthcare provider before you begin an exercise program   · Exercise for at least 30 minutes 5 days a week  Start slowly  Set aside time each day for physical activity that you enjoy and that is convenient for you  It is best to do both weight training and an activity that increases your heart rate, such as walking, bicycling, or swimming  · Find ways to be more active  Do yard work and housecleaning  Walk up the stairs instead of using elevators   Spend your leisure time going to events that require walking, such as outdoor festivals or fairs  This extra physical activity can help you lose weight and keep it off  Follow up with your healthcare provider as directed: You may need to meet with a dietitian  Write down your questions so you remember to ask them during your visits  © 2017 [employer] Information is for End User's use only and may not be sold, redistributed or otherwise used for commercial purposes  All illustrations and images included in CareNotes® are the copyrighted property of [employer]  or [employer]ost  The above information is an  only  It is not intended as medical advice for individual conditions or treatments  Talk to your doctor, nurse or pharmacist before following any medical regimen to see if it is safe and effective for you  Weight Management   AMBULATORY CARE:   Why it is important to manage your weight:  Being overweight increases your risk of health conditions such as heart disease, high blood pressure, type 2 diabetes, and certain types of cancer  It can also increase your risk for osteoarthritis, sleep apnea, and other respiratory problems  Aim for a slow, steady weight loss  Even a small amount of weight loss can lower your risk of health problems  How to lose weight safely:  A safe and healthy way to lose weight is to eat fewer calories and get regular exercise  You can lose up about 1 pound a week by decreasing the number of calories you eat by 500 calories each day  You can decrease calories by eating smaller portion sizes or by cutting out high-calorie foods  Read labels to find out how many calories are in the foods you eat  You can also burn calories with exercise such as walking, swimming, or biking  You will be more likely to keep weight off if you make these changes part of your lifestyle  Healthy meal plan for weight management:  A healthy meal plan includes a variety of foods, contains fewer calories, and helps you stay healthy   A healthy meal plan includes the following:  · Eat whole-grain foods more often  A healthy meal plan should contain fiber  Fiber is the part of grains, fruits, and vegetables that is not broken down by your body  Whole-grain foods are healthy and provide extra fiber in your diet  Some examples of whole-grain foods are whole-wheat breads and pastas, oatmeal, brown rice, and bulgur  · Eat a variety of vegetables every day  Include dark, leafy greens such as spinach, kale, joselito greens, and mustard greens  Eat yellow and orange vegetables such as carrots, sweet potatoes, and winter squash  · Eat a variety of fruits every day  Choose fresh or canned fruit (canned in its own juice or light syrup) instead of juice  Fruit juice has very little or no fiber  · Eat low-fat dairy foods  Drink fat-free (skim) milk or 1% milk  Eat fat-free yogurt and low-fat cottage cheese  Try low-fat cheeses such as mozzarella and other reduced-fat cheeses  · Choose meat and other protein foods that are low in fat  Choose beans or other legumes such as split peas or lentils  Choose fish, skinless poultry (chicken or turkey), or lean cuts of red meat (beef or pork)  Before you cook meat or poultry, cut off any visible fat  · Use less fat and oil  Try baking foods instead of frying them  Add less fat, such as margarine, sour cream, regular salad dressing and mayonnaise to foods  Eat fewer high-fat foods  Some examples of high-fat foods include french fries, doughnuts, ice cream, and cakes  · Eat fewer sweets  Limit foods and drinks that are high in sugar  This includes candy, cookies, regular soda, and sweetened drinks  Ways to decrease calories:   · Eat smaller portions  ¨ Use a small plate with smaller servings  ¨ Do not eat second helpings  ¨ When you eat at a restaurant, ask for a box and place half of your meal in the box before you eat  ¨ Share an entrée with someone else      · Replace high-calorie snacks with healthy, low-calorie snacks  ¨ Choose fresh fruit, vegetables, fat-free rice cakes, or air-popped popcorn instead of potato chips, nuts, or chocolate  ¨ Choose water or calorie-free drinks instead of soda or sweetened drinks  · Eat regular meals  Skipping meals can lead to overeating later in the day  Eat a healthy snack in place of a meal if you do not have time to eat a regular meal      · Do not shop for groceries when you are hungry  You may be more likely to make unhealthy food choices  Take a grocery list of healthy foods and shop after you have eaten  Exercise:  Exercise at least 30 minutes per day on most days of the week  Some examples of exercise include walking, biking, dancing, and swimming  You can also fit in more physical activity by taking the stairs instead of the elevator or parking farther away from stores  Ask your healthcare provider about the best exercise plan for you  Other things to consider as you try to lose weight:   · Be aware of situations that may give you the urge to overeat, such as eating while watching television  Find ways to avoid these situations  For example, read a book, go for a walk, or do crafts  · Meet with a weight loss support group or friends who are also trying to lose weight  This may help you stay motivated to continue working on your weight loss goals  © 2017 [employer] Information is for End User's use only and may not be sold, redistributed or otherwise used for commercial purposes  All illustrations and images included in CareNotes® are the copyrighted property of [employer]  or Gilles Yost  The above information is an  only  It is not intended as medical advice for individual conditions or treatments  Talk to your doctor, nurse or pharmacist before following any medical regimen to see if it is safe and effective for you  No follow-ups on file      Subjective:      Patient ID: Olimpia Merlos Brenda Roque is a 66 y o  male  Chief Complaint   Patient presents with    Rash     rt leg x4 days, painful, red, warm to the touch, spreading  jmcma       Pt is here for a same day appt for cellulitis    Pt states he felt a little pain in the medial aspect of his rt leg from below his knee to above his knee  Area is red  Rt leg was never swollen     Pt states while he is here he states his heart doctor wants him to have cholesterol drawn       The following portions of the patient's history were reviewed and updated as appropriate: allergies, current medications, past family history, past medical history, past social history, past surgical history and problem list     Review of Systems   Constitutional: Negative for activity change, appetite change, chills, diaphoresis, fatigue, fever and unexpected weight change  HENT: Negative for congestion, dental problem, ear pain, mouth sores, sinus pressure, sinus pain, sore throat and trouble swallowing  Eyes: Negative for photophobia, discharge and itching  Respiratory: Negative for apnea, chest tightness and shortness of breath  Cardiovascular: Negative for chest pain, palpitations and leg swelling  Gastrointestinal: Negative for abdominal distention, abdominal pain, blood in stool, nausea and vomiting  Endocrine: Negative for cold intolerance, heat intolerance, polydipsia, polyphagia and polyuria  Genitourinary: Negative for difficulty urinating  Musculoskeletal: Negative for arthralgias  Skin: Positive for color change  Negative for wound  Pain swelling and some edema in rt upper leg  Neurological: Negative for dizziness, syncope, speech difficulty and headaches  Hematological: Negative for adenopathy  Psychiatric/Behavioral: Negative for agitation and behavioral problems           Current Outpatient Medications   Medication Sig Dispense Refill    aspirin 81 mg chewable tablet Chew 81 mg daily      buPROPion (WELLBUTRIN XL) 300 mg 24 hr tablet Take 1 tablet (300 mg total) by mouth daily 90 tablet 0    Multiple Vitamins-Minerals (PX MENS MULTIVITAMINS) TABS Take 1 tablet by mouth daily      nitroglycerin (NITROSTAT) 0 4 mg SL tablet Place under the tongue      rosuvastatin (CRESTOR) 20 MG tablet Take 1 tablet (20 mg total) by mouth daily 90 tablet 3    sulfamethoxazole-trimethoprim (BACTRIM DS) 800-160 mg per tablet Take 1 tablet by mouth every 12 (twelve) hours for 10 days 20 tablet 0     No current facility-administered medications for this visit  Objective:    /77   Pulse 66   Temp 98 5 °F (36 9 °C)   Resp 15   Ht 6' (1 829 m)   Wt 126 kg (277 lb 3 2 oz)   BMI 37 60 kg/m²        Physical Exam   Constitutional: He appears well-developed and well-nourished  No distress  HENT:   Head: Normocephalic and atraumatic  Right Ear: External ear normal    Left Ear: External ear normal    Nose: Nose normal    Mouth/Throat: Oropharynx is clear and moist  No oropharyngeal exudate  Eyes: Pupils are equal, round, and reactive to light  EOM are normal  Right eye exhibits no discharge  Left eye exhibits no discharge  No scleral icterus  Neck: No thyromegaly present  Cardiovascular: Normal rate and normal heart sounds  No murmur heard  Pulmonary/Chest: Effort normal and breath sounds normal  No respiratory distress  He has no wheezes  Abdominal: Soft  Bowel sounds are normal  He exhibits no distension and no mass  There is no tenderness  There is no rebound and no guarding  Musculoskeletal: Normal range of motion  Neurological: He is alert  He displays normal reflexes  Coordination normal    Skin: Skin is warm and dry  No rash noted  He is not diaphoretic  No erythema  Psychiatric: He has a normal mood and affect  His behavior is normal    Nursing note and vitals reviewed  Shirley Pryor DO  BMI Counseling: Body mass index is 37 6 kg/m²  Discussed the patient's BMI with him  The BMI is above average  BMI counseling and education was provided to the patient  Nutrition recommendations include reducing portion sizes

## 2019-02-15 NOTE — PATIENT INSTRUCTIONS
Obesity   AMBULATORY CARE:   Obesity  is when your body mass index (BMI) is greater than 30  Your healthcare provider will use your height and weight to measure your BMI  The risks of obesity include  many health problems, such as injuries or physical disability  You may need tests to check for the following:  · Diabetes     · High blood pressure or high cholesterol     · Heart disease     · Gallbladder or liver disease     · Cancer of the colon, breast, prostate, liver, or kidney     · Sleep apnea     · Arthritis or gout  Seek care immediately if:   · You have a severe headache, confusion, or difficulty speaking  · You have weakness on one side of your body  · You have chest pain, sweating, or shortness of breath  Contact your healthcare provider if:   · You have symptoms of gallbladder or liver disease, such as pain in your upper abdomen  · You have knee or hip pain and discomfort while walking  · You have symptoms of diabetes, such as intense hunger and thirst, and frequent urination  · You have symptoms of sleep apnea, such as snoring or daytime sleepiness  · You have questions or concerns about your condition or care  Treatment for obesity  focuses on helping you lose weight to improve your health  Even a small decrease in BMI can reduce the risk for many health problems  Your healthcare provider will help you set a weight-loss goal   · Lifestyle changes  are the first step in treating obesity  These include making healthy food choices and getting regular physical activity  Your healthcare provider may suggest a weight-loss program that involves coaching, education, and therapy  · Medicine  may help you lose weight when it is used with a healthy diet and physical activity  · Surgery  can help you lose weight if you are very obese and have other health problems  There are several types of weight-loss surgery  Ask your healthcare provider for more information    Be successful losing weight:   · Set small, realistic goals  An example of a small goal is to walk for 20 minutes 5 days a week  Anther goal is to lose 5% of your body weight  · Tell friends, family members, and coworkers about your goals  and ask for their support  Ask a friend to lose weight with you, or join a weight-loss support group  · Identify foods or triggers that may cause you to overeat , and find ways to avoid them  Remove tempting high-calorie foods from your home and workplace  Place a bowl of fresh fruit on your kitchen counter  If stress causes you to eat, then find other ways to cope with stress  · Keep a diary to track what you eat and drink  Also write down how many minutes of physical activity you do each day  Weigh yourself once a week and record it in your diary  Eating changes: You will need to eat 500 to 1,000 fewer calories each day than you currently eat to lose 1 to 2 pounds a week  The following changes will help you cut calories:  · Eat smaller portions  Use small plates, no larger than 9 inches in diameter  Fill your plate half full of fruits and vegetables  Measure your food using measuring cups until you know what a serving size looks like  · Eat 3 meals and 1 or 2 snacks each day  Plan your meals in advance  [facility] and eat at home most of the time  Eat slowly  · Eat fruits and vegetables at every meal   They are low in calories and high in fiber, which makes you feel full  Do not add butter, margarine, or cream sauce to vegetables  Use herbs to season steamed vegetables  · Eat less fat and fewer fried foods  Eat more baked or grilled chicken and fish  These protein sources are lower in calories and fat than red meat  Limit fast food  Dress your salads with olive oil and vinegar instead of bottled dressing  · Limit the amount of sugar you eat  Do not drink sugary beverages  Limit alcohol  Activity changes:  Physical activity is good for your body in many ways   It helps you burn calories and build strong muscles  It decreases stress and depression, and improves your mood  It can also help you sleep better  Talk to your healthcare provider before you begin an exercise program   · Exercise for at least 30 minutes 5 days a week  Start slowly  Set aside time each day for physical activity that you enjoy and that is convenient for you  It is best to do both weight training and an activity that increases your heart rate, such as walking, bicycling, or swimming  · Find ways to be more active  Do yard work and housecleaning  Walk up the stairs instead of using elevators  Spend your leisure time going to events that require walking, such as outdoor festivals or fairs  This extra physical activity can help you lose weight and keep it off  Follow up with your healthcare provider as directed: You may need to meet with a dietitian  Write down your questions so you remember to ask them during your visits  © 2017 [employer] Information is for End User's use only and may not be sold, redistributed or otherwise used for commercial purposes  All illustrations and images included in CareNotes® are the copyrighted property of [employer]  or Gilles Yost  The above information is an  only  It is not intended as medical advice for individual conditions or treatments  Talk to your doctor, nurse or pharmacist before following any medical regimen to see if it is safe and effective for you  Weight Management   AMBULATORY CARE:   Why it is important to manage your weight:  Being overweight increases your risk of health conditions such as heart disease, high blood pressure, type 2 diabetes, and certain types of cancer  It can also increase your risk for osteoarthritis, sleep apnea, and other respiratory problems  Aim for a slow, steady weight loss  Even a small amount of weight loss can lower your risk of health problems    How to lose weight safely:  A safe and healthy way to lose weight is to eat fewer calories and get regular exercise  You can lose up about 1 pound a week by decreasing the number of calories you eat by 500 calories each day  You can decrease calories by eating smaller portion sizes or by cutting out high-calorie foods  Read labels to find out how many calories are in the foods you eat  You can also burn calories with exercise such as walking, swimming, or biking  You will be more likely to keep weight off if you make these changes part of your lifestyle  Healthy meal plan for weight management:  A healthy meal plan includes a variety of foods, contains fewer calories, and helps you stay healthy  A healthy meal plan includes the following:  · Eat whole-grain foods more often  A healthy meal plan should contain fiber  Fiber is the part of grains, fruits, and vegetables that is not broken down by your body  Whole-grain foods are healthy and provide extra fiber in your diet  Some examples of whole-grain foods are whole-wheat breads and pastas, oatmeal, brown rice, and bulgur  · Eat a variety of vegetables every day  Include dark, leafy greens such as spinach, kale, joselito greens, and mustard greens  Eat yellow and orange vegetables such as carrots, sweet potatoes, and winter squash  · Eat a variety of fruits every day  Choose fresh or canned fruit (canned in its own juice or light syrup) instead of juice  Fruit juice has very little or no fiber  · Eat low-fat dairy foods  Drink fat-free (skim) milk or 1% milk  Eat fat-free yogurt and low-fat cottage cheese  Try low-fat cheeses such as mozzarella and other reduced-fat cheeses  · Choose meat and other protein foods that are low in fat  Choose beans or other legumes such as split peas or lentils  Choose fish, skinless poultry (chicken or turkey), or lean cuts of red meat (beef or pork)  Before you cook meat or poultry, cut off any visible fat  · Use less fat and oil  Try baking foods instead of frying them  Add less fat, such as margarine, sour cream, regular salad dressing and mayonnaise to foods  Eat fewer high-fat foods  Some examples of high-fat foods include french fries, doughnuts, ice cream, and cakes  · Eat fewer sweets  Limit foods and drinks that are high in sugar  This includes candy, cookies, regular soda, and sweetened drinks  Ways to decrease calories:   · Eat smaller portions  ¨ Use a small plate with smaller servings  ¨ Do not eat second helpings  ¨ When you eat at a restaurant, ask for a box and place half of your meal in the box before you eat  ¨ Share an entrée with someone else  · Replace high-calorie snacks with healthy, low-calorie snacks  ¨ Choose fresh fruit, vegetables, fat-free rice cakes, or air-popped popcorn instead of potato chips, nuts, or chocolate  ¨ Choose water or calorie-free drinks instead of soda or sweetened drinks  · Eat regular meals  Skipping meals can lead to overeating later in the day  Eat a healthy snack in place of a meal if you do not have time to eat a regular meal      · Do not shop for groceries when you are hungry  You may be more likely to make unhealthy food choices  Take a grocery list of healthy foods and shop after you have eaten  Exercise:  Exercise at least 30 minutes per day on most days of the week  Some examples of exercise include walking, biking, dancing, and swimming  You can also fit in more physical activity by taking the stairs instead of the elevator or parking farther away from stores  Ask your healthcare provider about the best exercise plan for you  Other things to consider as you try to lose weight:   · Be aware of situations that may give you the urge to overeat, such as eating while watching television  Find ways to avoid these situations  For example, read a book, go for a walk, or do crafts      · Meet with a weight loss support group or friends who are also trying to lose weight  This may help you stay motivated to continue working on your weight loss goals  © 2017 [employer] Information is for End User's use only and may not be sold, redistributed or otherwise used for commercial purposes  All illustrations and images included in CareNotes® are the copyrighted property of [employer]  or Gilles Yost  The above information is an  only  It is not intended as medical advice for individual conditions or treatments  Talk to your doctor, nurse or pharmacist before following any medical regimen to see if it is safe and effective for you

## 2019-02-16 PROCEDURE — 93971 EXTREMITY STUDY: CPT | Performed by: SURGERY

## 2019-02-18 ENCOUNTER — TELEPHONE (OUTPATIENT)
Dept: FAMILY MEDICINE CLINIC | Facility: CLINIC | Age: 59
End: 2019-02-18

## 2019-02-23 DIAGNOSIS — F33.0 MILD EPISODE OF RECURRENT MAJOR DEPRESSIVE DISORDER (HCC): ICD-10-CM

## 2019-02-25 RX ORDER — BUPROPION HYDROCHLORIDE 300 MG/1
300 TABLET ORAL DAILY
Qty: 90 TABLET | Refills: 1 | Status: SHIPPED | OUTPATIENT
Start: 2019-02-25 | End: 2019-09-14 | Stop reason: SDUPTHER

## 2019-05-01 ENCOUNTER — APPOINTMENT (OUTPATIENT)
Dept: LAB | Facility: CLINIC | Age: 59
End: 2019-05-01
Payer: COMMERCIAL

## 2019-05-01 DIAGNOSIS — E78.2 MIXED HYPERLIPIDEMIA: ICD-10-CM

## 2019-05-01 LAB
ALBUMIN SERPL BCP-MCNC: 3.9 G/DL (ref 3.5–5)
ALP SERPL-CCNC: 67 U/L (ref 46–116)
ALT SERPL W P-5'-P-CCNC: 29 U/L (ref 12–78)
ANION GAP SERPL CALCULATED.3IONS-SCNC: 3 MMOL/L (ref 4–13)
AST SERPL W P-5'-P-CCNC: 11 U/L (ref 5–45)
BILIRUB SERPL-MCNC: 0.65 MG/DL (ref 0.2–1)
BUN SERPL-MCNC: 9 MG/DL (ref 5–25)
CALCIUM SERPL-MCNC: 8.3 MG/DL (ref 8.3–10.1)
CHLORIDE SERPL-SCNC: 109 MMOL/L (ref 100–108)
CHOLEST SERPL-MCNC: 99 MG/DL (ref 50–200)
CO2 SERPL-SCNC: 30 MMOL/L (ref 21–32)
CREAT SERPL-MCNC: 1.01 MG/DL (ref 0.6–1.3)
GFR SERPL CREATININE-BSD FRML MDRD: 82 ML/MIN/1.73SQ M
GLUCOSE P FAST SERPL-MCNC: 95 MG/DL (ref 65–99)
HDLC SERPL-MCNC: 45 MG/DL (ref 40–60)
LDLC SERPL CALC-MCNC: 38 MG/DL (ref 0–100)
POTASSIUM SERPL-SCNC: 3.9 MMOL/L (ref 3.5–5.3)
PROT SERPL-MCNC: 6.8 G/DL (ref 6.4–8.2)
SODIUM SERPL-SCNC: 142 MMOL/L (ref 136–145)
TRIGL SERPL-MCNC: 82 MG/DL

## 2019-05-01 PROCEDURE — 80053 COMPREHEN METABOLIC PANEL: CPT

## 2019-05-01 PROCEDURE — 80061 LIPID PANEL: CPT

## 2019-05-01 PROCEDURE — 36415 COLL VENOUS BLD VENIPUNCTURE: CPT

## 2019-07-08 ENCOUNTER — HOSPITAL ENCOUNTER (EMERGENCY)
Facility: HOSPITAL | Age: 59
Discharge: HOME | End: 2019-07-08
Attending: EMERGENCY MEDICINE | Admitting: EMERGENCY MEDICINE
Payer: COMMERCIAL

## 2019-07-08 VITALS
RESPIRATION RATE: 18 BRPM | SYSTOLIC BLOOD PRESSURE: 171 MMHG | DIASTOLIC BLOOD PRESSURE: 77 MMHG | HEART RATE: 75 BPM | OXYGEN SATURATION: 97 % | HEIGHT: 72 IN | BODY MASS INDEX: 37.62 KG/M2 | TEMPERATURE: 99 F | WEIGHT: 277.78 LBS

## 2019-07-08 DIAGNOSIS — M54.32 SCIATICA OF LEFT SIDE: Primary | ICD-10-CM

## 2019-07-08 PROCEDURE — 99283 EMERGENCY DEPT VISIT LOW MDM: CPT

## 2019-07-08 RX ORDER — CYCLOBENZAPRINE HCL 10 MG
10 TABLET ORAL ONCE
Status: COMPLETED | OUTPATIENT
Start: 2019-07-08 | End: 2019-07-08

## 2019-07-08 RX ORDER — LIDOCAINE 50 MG/G
1 PATCH TOPICAL ONCE
Status: DISCONTINUED | OUTPATIENT
Start: 2019-07-08 | End: 2019-07-08 | Stop reason: HOSPADM

## 2019-07-08 RX ORDER — LIDOCAINE 50 MG/G
1 PATCH TOPICAL DAILY
Qty: 15 PATCH | Refills: 0 | Status: SHIPPED | OUTPATIENT
Start: 2019-07-08 | End: 2020-02-03

## 2019-07-08 RX ORDER — KETOROLAC TROMETHAMINE 30 MG/ML
15 INJECTION, SOLUTION INTRAMUSCULAR; INTRAVENOUS ONCE
Status: DISCONTINUED | OUTPATIENT
Start: 2019-07-08 | End: 2019-07-08 | Stop reason: HOSPADM

## 2019-07-08 RX ORDER — CYCLOBENZAPRINE HCL 10 MG
10 TABLET ORAL 3 TIMES DAILY PRN
Qty: 15 TABLET | Refills: 0 | Status: SHIPPED | OUTPATIENT
Start: 2019-07-08 | End: 2020-02-03

## 2019-07-08 RX ADMIN — LIDOCAINE 1 PATCH: 50 PATCH TOPICAL at 15:16

## 2019-07-08 RX ADMIN — CYCLOBENZAPRINE HYDROCHLORIDE 10 MG: 10 TABLET, FILM COATED ORAL at 15:16

## 2019-07-08 NOTE — ED NOTES
C/o L hip pain that radiates down L leg  Reports inability to bear weight       Nigel Runner, VENANCIO  07/08/19 2843

## 2019-07-09 ENCOUNTER — TELEPHONE (OUTPATIENT)
Dept: PAIN MEDICINE | Facility: CLINIC | Age: 59
End: 2019-07-09

## 2019-07-09 NOTE — TELEPHONE ENCOUNTER
What is the condition you would like to be seen for? F/u ED- SELF REFERRAL-- L sciatica  ? Have you been treated by any other physician for this condition? ED  ? Have you seen a pain specialist in the past? no  ?  Have you had any testing/imaging done for this condition? None recent  ? Are you currently taking any prescription medications for this condition? yes     DISCLAIMER READ TO PATIENT's SPOUSE:    [address]  [facility] are focused using minimally invasive procedures as both therapeutic and curing treatment options for pain  Our physicians are committed to reducing the amount of opioid medications that are used for the management of pain and do not offer them as a first line treatment  We do not offer medical prescriptive management of opioids to patients that cannot benefit from a procedure-based approach  We also do not offer the [employer] of prescriptive opioid management for patients from other practices       They understand and acknowledge disclaimer  ? Insurance: [facility]/ [facility]  Referral Required? no  ? No MVA or WC  ? Dr Mandy Fernández / [facility] [place] location   ? Patient advised that they should receive the NP paperwork in the mail prior to their appointment  If they do not receive the paperwork; or if the appointment is within 3-7 days they can print it off the spine and pain website: [employer] or Arrive 45 minutes prior to their appointment time, or retrieve it from the practice in advance to their appointment  It will take approximately 30 minutes to complete  ?   Spouse will print forms from website

## 2019-07-09 NOTE — ED PROVIDER NOTES
History  Chief Complaint   Patient presents with    Hip Pain     Patient states that he has left sided hip pain 3 days ago  States that the pain is intense  Patient presents for evaluation of left sided hip, lower back pain radiating down the back of the left leg  Started 3 days ago and was worse today  Took Tylenol today without relief, did not take anything on the prior days  Denies any injury or trauma  States it got worse while laying on the couch today  Denies weakness  Denies bowel or bladder dysfunction  History provided by:  Patient   used: No    Hip Pain       Prior to Admission Medications   Prescriptions Last Dose Informant Patient Reported? Taking? Multiple Vitamins-Minerals (PX MENS MULTIVITAMINS) TABS  Self Yes No   Sig: Take 1 tablet by mouth daily   aspirin 81 mg chewable tablet  Self Yes No   Sig: Chew 81 mg daily   buPROPion (WELLBUTRIN XL) 300 mg 24 hr tablet   No No   Sig: Take 1 tablet (300 mg total) by mouth daily   nitroglycerin (NITROSTAT) 0 4 mg SL tablet  Self Yes No   Sig: Place under the tongue   rosuvastatin (CRESTOR) 20 MG tablet  Self No No   Sig: Take 1 tablet (20 mg total) by mouth daily      Facility-Administered Medications: None       Past Medical History:   Diagnosis Date    Coronary artery disease     Depression     Dermoid cyst of face     resolved 04/19/2016    Hyperlipidemia     Myocardial infarction ([facility] [address] ) 04/15/2016    99% RCA block    Presenile dementia, depressed type     onst 11/03/2009, resolved 07/21/2016    Psychiatric disorder     depression       Past Surgical History:   Procedure Laterality Date    APPENDECTOMY      COLONOSCOPY N/A 12/14/2016    Procedure: COLONOSCOPY;  Surgeon: Tj Lockett MD;  Location: [place] [address] GI LAB; Service:     CORONARY ANGIOPLASTY WITH STENT PLACEMENT Right 04/15/2016    ESOPHAGOGASTRODUODENOSCOPY N/A 12/14/2016    Procedure: ESOPHAGOGASTRODUODENOSCOPY (EGD);   Surgeon: Tj Lockett MD; Location: [facility]; Service:        Family History   Problem Relation Age of Onset    Arthritis Mother     Hypertension Mother     Hypertension Father     Heart disease Father     Coronary artery disease Father     Depression Father     Hypertension Brother     Cancer Maternal Grandfather      I have reviewed and agree with the history as documented  Social History     Tobacco Use    Smoking status: Former Smoker     Packs/day: 0 50     Years: 15 00     Pack years: 7 50     Types: Cigarettes     Last attempt to quit: 4/15/2016     Years since quitting: 3 2    Smokeless tobacco: Never Used   Substance Use Topics    Alcohol use: Yes     Comment: 2-3 beers every cpl days    Drug use: Yes     Frequency: 2 0 times per week     Types: Marijuana     Comment: today        Review of Systems   Musculoskeletal: Positive for back pain  All other systems reviewed and are negative  Physical Exam  Physical Exam   Constitutional: He is oriented to person, place, and time  No distress  Cardiovascular: Normal rate, regular rhythm and intact distal pulses  Pulmonary/Chest: Effort normal and breath sounds normal  No respiratory distress  Abdominal: Soft  There is no tenderness  Musculoskeletal: Normal range of motion  He exhibits tenderness  Arms:  Neurological: He is alert and oriented to person, place, and time  No sensory deficit  He exhibits normal muscle tone  Skin: Capillary refill takes less than 2 seconds  He is not diaphoretic  Nursing note and vitals reviewed        Vital Signs  ED Triage Vitals   Temperature Pulse Respirations Blood Pressure SpO2   07/08/19 1439 07/08/19 1439 07/08/19 1439 07/08/19 1442 07/08/19 1439   99 °F (37 2 °C) 75 18 (!) 171/77 97 %      Temp src Heart Rate Source Patient Position - Orthostatic VS BP Location FiO2 (%)   -- -- -- -- --             Pain Score       07/08/19 1439       7           Vitals:    07/08/19 1439 07/08/19 1442   BP:  (!) 171/77 Pulse: 75          Visual Acuity      ED Medications  Medications   cyclobenzaprine (FLEXERIL) tablet 10 mg (10 mg Oral Given 7/8/19 1516)       Diagnostic Studies  Results Reviewed     None                 No orders to display              Procedures  Procedures       ED Course                               MDM  Number of Diagnoses or Management Options  Sciatica of left side:   Diagnosis management comments: Pulse ox 97% on RA indicating adequate oxygenaiton       Amount and/or Complexity of Data Reviewed  Decide to obtain previous medical records or to obtain history from someone other than the patient: yes  Review and summarize past medical records: yes    Patient Progress  Patient progress: improved      Disposition  Final diagnoses:   Sciatica of left side     Time reflects when diagnosis was documented in both MDM as applicable and the Disposition within this note     Time User Action Codes Description Comment    7/8/2019  3:49 PM Leticia Rosenthal Add [M54 32] Sciatica of left side       ED Disposition     ED Disposition Condition Date/Time Comment    Discharge Stable Mon Jul 8, 2019  3:48 PM Hoda Romero III discharge to home/self care              Follow-up Information     Follow up With Specialties Details Why Contact Info Additional Information    Ashley Mendoza DO Family Medicine, Wound Care In 1 week  [address] [place] [address] Emergency Department Emergency Medicine  If symptoms worsen [address] [facility] [address]  [phone] [facility] ED, [place], [place], [address]          Discharge Medication List as of 7/8/2019  3:49 PM      START taking these medications    Details   cyclobenzaprine (FLEXERIL) 10 mg tablet Take 1 tablet (10 mg total) by mouth 3 (three) times a day as needed for muscle spasms for up to 15 doses, Starting Mon 7/8/2019, Normal      lidocaine (LIDODERM) 5 % Apply 1 patch topically daily for 15 doses Remove & Discard patch within 12 hours or as directed by MD, Starting Mon 7/8/2019, Until Tue 7/23/2019, Normal         CONTINUE these medications which have NOT CHANGED    Details   aspirin 81 mg chewable tablet Chew 81 mg daily, Historical Med      buPROPion (WELLBUTRIN XL) 300 mg 24 hr tablet Take 1 tablet (300 mg total) by mouth daily, Starting Mon 2/25/2019, Normal      Multiple Vitamins-Minerals (PX MENS MULTIVITAMINS) TABS Take 1 tablet by mouth daily, Historical Med      nitroglycerin (NITROSTAT) 0 4 mg SL tablet Place under the tongue, Historical Med      rosuvastatin (CRESTOR) 20 MG tablet Take 1 tablet (20 mg total) by mouth daily, Starting Wed 9/5/2018, Normal               ED Provider  Electronically Signed by           Eryn Cox DO  07/09/19 9350

## 2019-07-15 ENCOUNTER — CONSULT (OUTPATIENT)
Dept: PAIN MEDICINE | Facility: CLINIC | Age: 59
End: 2019-07-15
Payer: COMMERCIAL

## 2019-07-15 VITALS
DIASTOLIC BLOOD PRESSURE: 77 MMHG | SYSTOLIC BLOOD PRESSURE: 123 MMHG | BODY MASS INDEX: 36.82 KG/M2 | HEART RATE: 59 BPM | WEIGHT: 271.8 LBS | HEIGHT: 72 IN

## 2019-07-15 DIAGNOSIS — M54.16 LUMBAR RADICULOPATHY: ICD-10-CM

## 2019-07-15 DIAGNOSIS — M54.42 ACUTE LEFT-SIDED LOW BACK PAIN WITH LEFT-SIDED SCIATICA: Primary | ICD-10-CM

## 2019-07-15 PROCEDURE — 99244 OFF/OP CNSLTJ NEW/EST MOD 40: CPT | Performed by: ANESTHESIOLOGY

## 2019-07-15 NOTE — PROGRESS NOTES
Assessment:  1  Acute left-sided low back pain with left-sided sciatica    2  Lumbar radiculopathy        Plan:  Orders Placed This Encounter   Procedures    Ambulatory referral to Physical Therapy     Standing Status:   Future     Standing Expiration Date:   7/15/2020     Referral Priority:   Routine     Referral Type:   Physical Therapy     Referral Reason:   Specialty Services Required     Requested Specialty:   Physical Therapy     Number of Visits Requested:   1     Expiration Date:   7/15/2020     My impressions and treatment recommendations were discussed in detail with the patient, who verbalized understanding and had no further questions  The patient appears to have pain in his left L5 and S1 distribution  He states that it is only been a few days, but it has improved  As such, I felt a reasonable to have the patient undergo a course of physical therapy 2-3 times per week for 4-6 weeks  If the patient is low back pain and left lower extremity radicular symptoms do not improve the physical therapy, I will obtain a MRI of the lumbar spine  Follow-up is planned in 6 weeks time or sooner as warranted  Discharge instructions were provided  I personally saw and examined the patient and I agree with the above discussed plan of care  History of Present Illness:    Chirag Hill is a 64 y o  male who presents to [facility] for initial evaluation of the above stated pain complaints  The patient has a past medical and chronic pain history as outlined in the assessment section  He was referred by Dr Ada Granda  Patient is reporting primarily left-sided low back pain with radiation to the left lower extremity what appears to be the left L5 and S1 distribution  He states that his pain started last week and had gotten to the point where he could not walk    He went to the emergency department where he was given medications and he states that his pain improved significantly  His current pain score is 3 to 4/10 on the verbal numerical pain rating scale  His pain was severe and constant in nature  He states that his pain is worse in the afternoon  He states that his pain is shooting, numbness, and sharp    He reports weakness in his left lower extremity  He does not ambulate with any assistive devices  The patient reports that lying down and relaxation decreases pain  Standing, bending, sitting, walking, exercise, coughing, and sneezing increases pain  He reports that exercise does not provide any relief of symptoms  Heat/ice treatment provides moderate pain relief  He is reporting that she cyclobenzaprine, ibuprofen, and acetaminophen have provided relief of symptoms for him  Review of Systems:    Review of Systems   Constitutional: Negative  HENT: Negative  Eyes: Negative  Respiratory: Negative  Cardiovascular: Negative  Gastrointestinal: Negative  Endocrine: Negative  Genitourinary: Negative  Musculoskeletal: Negative  Skin: Negative  Allergic/Immunologic: Negative  Neurological: Negative  Hematological: Negative  Psychiatric/Behavioral: Negative  Depression          Patient Active Problem List   Diagnosis    Arteriosclerotic heart disease    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    Neoplasm of uncertain behavior of skin of wrist    Neoplasm of uncertain behavior of face    Anxiety    Combined arterial insufficiency and corporo-venous occlusive erectile dysfunction    Gout    Mild episode of recurrent major depressive disorder (HCC)    Mixed hyperlipidemia    Lymphadenopathy of head and neck    Severe obesity (BMI 35 0-39  9) with comorbidity ([facility] [address] )    BMI 37 0-37 9, adult       Past Medical History:   Diagnosis Date    Coronary artery disease     Depression     Dermoid cyst of face     resolved 04/19/2016    Hyperlipidemia     [facility] 04/15/2016    99% RCA block    Presenile dementia, depressed type     onst 11/03/2009, resolved 07/21/2016    Psychiatric disorder     depression       Past Surgical History:   Procedure Laterality Date    APPENDECTOMY      COLONOSCOPY N/A 12/14/2016    Procedure: COLONOSCOPY;  Surgeon: Tj Lockett MD;  Location: [place] [address] [facility]; Service:     CORONARY ANGIOPLASTY WITH STENT PLACEMENT Right 04/15/2016    ESOPHAGOGASTRODUODENOSCOPY N/A 12/14/2016    Procedure: ESOPHAGOGASTRODUODENOSCOPY (EGD); Surgeon: Tj Lockett MD;  Location: [facility];   Service:        Family History   Problem Relation Age of Onset   Kylah Velázquez Arthritis Mother     Hypertension Mother     Hypertension Father     Heart disease Father     Coronary artery disease Father     Depression Father     Hypertension Brother     Cancer Maternal Grandfather        Social History     Occupational History    Not on file   Tobacco Use    Smoking status: Former Smoker     Packs/day: 0 50     Years: 15 00     Pack years: 7 50     Types: Cigarettes     Last attempt to quit: 4/15/2016     Years since quitting: 3 2    Smokeless tobacco: Never Used   Substance and Sexual Activity    Alcohol use: Yes     Comment: 2-3 beers every cpl days    Drug use: Yes     Frequency: 2 0 times per week     Types: Marijuana     Comment: daily     Sexual activity: Not on file         Current Outpatient Medications:     aspirin 81 mg chewable tablet, Chew 81 mg daily, Disp: , Rfl:     buPROPion (WELLBUTRIN XL) 300 mg 24 hr tablet, Take 1 tablet (300 mg total) by mouth daily, Disp: 90 tablet, Rfl: 1    cyclobenzaprine (FLEXERIL) 10 mg tablet, Take 1 tablet (10 mg total) by mouth 3 (three) times a day as needed for muscle spasms for up to 15 doses, Disp: 15 tablet, Rfl: 0    lidocaine (LIDODERM) 5 %, Apply 1 patch topically daily for 15 doses Remove & Discard patch within 12 hours or as directed by MD, Disp: 15 patch, Rfl: 0    Multiple Vitamins-Minerals (PX MENS MULTIVITAMINS) TABS, Take 1 tablet by mouth daily, Disp: , Rfl:     nitroglycerin (NITROSTAT) 0 4 mg SL tablet, Place under the tongue, Disp: , Rfl:     rosuvastatin (CRESTOR) 20 MG tablet, Take 1 tablet (20 mg total) by mouth daily, Disp: 90 tablet, Rfl: 3    Allergies   Allergen Reactions    Amoxicillin Itching    Lipitor [Atorvastatin]        Physical Exam:    /77 (BP Location: Right arm, Patient Position: Sitting, Cuff Size: Large)   Pulse 59   Ht 6' (1 829 m)   Wt 123 kg (271 lb 12 8 oz)   BMI 36 86 kg/m²     Constitutional: obese  Eyes: anicteric  HEENT: grossly intact  Neck: supple, symmetric, trachea midline and no masses   Pulmonary:even and unlabored  Cardiovascular:No edema or pitting edema present  Skin:Normal without rashes or lesions and well hydrated  Psychiatric:Mood and affect appropriate  Neurologic:Cranial Nerves II-XII grossly intact  Musculoskeletal:antalgic     Lumbar Spine Exam    Appearance:  Normal lordosis  Palpation/Tenderness:  no tenderness or spasm  Sensory:  no sensory deficits noted  Range of Motion:  Flexion:   Moderately limited  with pain  Extension:  Moderately limited  with pain  Lateral Flexion - Left:  Moderately limited  with pain  Lateral Flexion - Right:  Moderately limited  with pain  Rotation - Left:  Moderately limited  with pain  Rotation - Right:  Moderately limited  with pain   Lumbar facet loading is positive on the left and negative on the right  Motor Strength:  Left hip flexion:  5/5  Left hip extension:  5/5  Right hip flexion:  5/5  Right hip extension:  5/5  Left knee flexion:  5/5  Left knee extension:  5/5  Right knee flexion:  5/5  Right knee extension:  5/5  Left foot dorsiflexion:  5/5  Left foot plantar flexion:  5/5  Right foot dorsiflexion:  5/5  Right foot plantar flexion:  5/5  Reflexes:  Left Patellar:  absent   Right Patellar:  2+   Left Achilles:  2+   Right Achilles:  2+   Special Tests:  Left Straight Leg Test:  positive  Right Straight Leg Test:  negative  Left Aaron's Maneuver:  negative  Right Aaron's Maneuver:  negative    Orders Placed This Encounter   Procedures    Ambulatory referral to Physical Therapy

## 2019-09-14 DIAGNOSIS — F33.0 MILD EPISODE OF RECURRENT MAJOR DEPRESSIVE DISORDER (HCC): ICD-10-CM

## 2019-09-16 RX ORDER — BUPROPION HYDROCHLORIDE 300 MG/1
300 TABLET ORAL DAILY
Qty: 90 TABLET | Refills: 1 | Status: SHIPPED | OUTPATIENT
Start: 2019-09-16 | End: 2020-02-03

## 2019-09-29 DIAGNOSIS — I21.11 ST ELEVATION (STEMI) MYOCARDIAL INFARCTION INVOLVING RIGHT CORONARY ARTERY (HCC): ICD-10-CM

## 2019-09-29 DIAGNOSIS — I25.10 ARTERIOSCLEROTIC HEART DISEASE: ICD-10-CM

## 2019-10-02 RX ORDER — ROSUVASTATIN CALCIUM 20 MG/1
TABLET, COATED ORAL
Qty: 90 TABLET | Refills: 0 | Status: SHIPPED | OUTPATIENT
Start: 2019-10-02 | End: 2020-01-08

## 2019-11-14 ENCOUNTER — OFFICE VISIT (OUTPATIENT)
Dept: CARDIOLOGY CLINIC | Facility: CLINIC | Age: 59
End: 2019-11-14
Payer: COMMERCIAL

## 2019-11-14 VITALS
DIASTOLIC BLOOD PRESSURE: 70 MMHG | HEIGHT: 72 IN | HEART RATE: 63 BPM | WEIGHT: 274 LBS | BODY MASS INDEX: 37.11 KG/M2 | SYSTOLIC BLOOD PRESSURE: 120 MMHG | OXYGEN SATURATION: 96 %

## 2019-11-14 DIAGNOSIS — E78.2 MIXED HYPERLIPIDEMIA: ICD-10-CM

## 2019-11-14 DIAGNOSIS — I25.10 ARTERIOSCLEROTIC HEART DISEASE: Primary | ICD-10-CM

## 2019-11-14 DIAGNOSIS — M54.42 CHRONIC LEFT-SIDED LOW BACK PAIN WITH LEFT-SIDED SCIATICA: ICD-10-CM

## 2019-11-14 DIAGNOSIS — G89.29 CHRONIC LEFT-SIDED LOW BACK PAIN WITH LEFT-SIDED SCIATICA: ICD-10-CM

## 2019-11-14 DIAGNOSIS — E66.01 SEVERE OBESITY (BMI 35.0-39.9) WITH COMORBIDITY (HCC): ICD-10-CM

## 2019-11-14 PROCEDURE — 99214 OFFICE O/P EST MOD 30 MIN: CPT | Performed by: INTERNAL MEDICINE

## 2019-11-14 PROCEDURE — 93000 ELECTROCARDIOGRAM COMPLETE: CPT | Performed by: INTERNAL MEDICINE

## 2019-11-14 NOTE — PROGRESS NOTES
Celso Macee III  1960  [id number]  [address] [facility] [address] [address]  [phone]    Interval History:  Susannah Louie is a 64 y o  male who presents for routine coronary artery disease follow-up  Since his last visit, he has been feeling well without any chest pain or shortness of breath  He denies any LE edema, orthopnea or PND  In April of 2016, he underwent PCI to a 99% mRCA lesion after a STEMI  He was driving and felt tightness in the center part of his chest associated with diaphoresis  PCI done from right radial approach  Current symptoms:A pinching sensation in the left side of his chest that improved with advil  He had a treadmill stress test last year which was normal at 80% MAPHR  He had erectile dysfunction last visit and was given Viagra which has helped  He has leg pain which limits his activity  Cardiac risk factors include advanced age (older than 54 for men, 72 for women), dyslipidemia, hypertension, male gender and obesity (BMI >= 30 kg/m2)  Past Medical History:   Diagnosis Date    Coronary artery disease     Depression     Dermoid cyst of face     resolved 04/19/2016    Hyperlipidemia     Myocardial infarction ([facility] [address] ) 04/15/2016    99% RCA block    Presenile dementia, depressed type ([address] )     onst 11/03/2009, resolved 07/21/2016    Psychiatric disorder     depression     Past Surgical History:   Procedure Laterality Date    APPENDECTOMY      COLONOSCOPY N/A 12/14/2016    Procedure: COLONOSCOPY;  Surgeon: Tara Whitney MD;  Location: [facility]; Service:     CORONARY ANGIOPLASTY WITH STENT PLACEMENT Right 04/15/2016    ESOPHAGOGASTRODUODENOSCOPY N/A 12/14/2016    Procedure: ESOPHAGOGASTRODUODENOSCOPY (EGD); Surgeon: Tara Whitney MD;  Location: [facility];   Service:      Social History     Socioeconomic History    Marital status: /Civil Union Spouse name: Not on file    Number of children: Not on file    Years of education: Not on file    Highest education level: Not on file   Occupational History    Not on file   Social Needs    Financial resource strain: Not on file    Food insecurity:     Worry: Not on file     Inability: Not on file    Transportation needs:     Medical: Not on file     Non-medical: Not on file   Tobacco Use    Smoking status: Former Smoker     Packs/day: 0 50     Years: 15 00     Pack years: 7 50     Types: Cigarettes     Last attempt to quit: 4/15/2016     Years since quitting: 3 5    Smokeless tobacco: Never Used   Substance and Sexual Activity    Alcohol use: Yes     Comment: 2-3 beers every cpl days    Drug use: Yes     Frequency: 2 0 times per week     Types: Marijuana     Comment: daily     Sexual activity: Not on file   Lifestyle    Physical activity:     Days per week: Not on file     Minutes per session: Not on file    Stress: Not on file   Relationships    Social connections:     Talks on phone: Not on file     Gets together: Not on file     Attends Jainism service: Not on file     Active member of club or organization: Not on file     Attends meetings of clubs or organizations: Not on file     Relationship status: Not on file    Intimate partner violence:     Fear of current or ex partner: Not on file     Emotionally abused: Not on file     Physically abused: Not on file     Forced sexual activity: Not on file   Other Topics Concern    Not on file   Social History Narrative    Lack of exercise    Pets/animals: Dog    Sleeps 8-10 hours a day             Family History   Problem Relation Age of Onset    Arthritis Mother     Hypertension Mother     Hypertension Father     Heart disease Father     Coronary artery disease Father     Depression Father     Hypertension Brother     Cancer Maternal Grandfather        Current Outpatient Medications:     aspirin 81 mg chewable tablet, Chew 81 mg daily, Disp: , Rfl:     buPROPion (WELLBUTRIN XL) 300 mg 24 hr tablet, Take 1 tablet (300 mg total) by mouth daily, Disp: 90 tablet, Rfl: 1    cyclobenzaprine (FLEXERIL) 10 mg tablet, Take 1 tablet (10 mg total) by mouth 3 (three) times a day as needed for muscle spasms for up to 15 doses, Disp: 15 tablet, Rfl: 0    Multiple Vitamins-Minerals (PX MENS MULTIVITAMINS) TABS, Take 1 tablet by mouth daily, Disp: , Rfl:     nitroglycerin (NITROSTAT) 0 4 mg SL tablet, Place under the tongue, Disp: , Rfl:     rosuvastatin (CRESTOR) 20 MG tablet, TAKE ONE TABLET BY MOUTH EVERY DAY, Disp: 90 tablet, Rfl: 0    lidocaine (LIDODERM) 5 %, Apply 1 patch topically daily for 15 doses Remove & Discard patch within 12 hours or as directed by MD, Disp: 15 patch, Rfl: 0  The following portions of the patient's history were reviewed and updated as appropriate: allergies, current medications, past family history, past medical history, past social history, past surgical history and problem list     Review of Systems:  Review of Systems   Constitutional: Negative for chills, fatigue and fever  HENT: Negative for congestion, nosebleeds and postnasal drip  Respiratory: Negative for cough, chest tightness and shortness of breath  Cardiovascular: Negative for chest pain, palpitations and leg swelling  Gastrointestinal: Negative for abdominal distention, abdominal pain, diarrhea, nausea and vomiting  Endocrine: Negative for polydipsia, polyphagia and polyuria  Musculoskeletal: Positive for arthralgias, back pain, gait problem and myalgias  Skin: Negative for color change, pallor and rash  Allergic/Immunologic: Negative for environmental allergies, food allergies and immunocompromised state  Neurological: Negative for dizziness, seizures, syncope and light-headedness  Hematological: Negative for adenopathy  Does not bruise/bleed easily  Psychiatric/Behavioral: Positive for sleep disturbance  Negative for dysphoric mood  The patient is not nervous/anxious  Physical Exam:  /70 (BP Location: Left arm, Patient Position: Sitting, Cuff Size: Standard)   Pulse 63   Ht 6' (1 829 m)   Wt 124 kg (274 lb)   SpO2 96%   BMI 37 16 kg/m²     Physical Exam   Constitutional: He is oriented to person, place, and time  He appears well-developed  No distress  HENT:   Head: Normocephalic and atraumatic  Eyes: Pupils are equal, round, and reactive to light  Conjunctivae and EOM are normal    Neck: Neck supple  No JVD present  No thyromegaly present  Cardiovascular: Normal rate, regular rhythm and normal heart sounds  Exam reveals no gallop and no friction rub  No murmur heard  Pulmonary/Chest: Effort normal and breath sounds normal    Abdominal: Soft  He exhibits no distension  There is no tenderness  Musculoskeletal: He exhibits no edema  Neurological: He is alert and oriented to person, place, and time  No cranial nerve deficit  Skin: Skin is warm and dry  No rash noted  He is not diaphoretic  No erythema  Psychiatric: He has a normal mood and affect  His behavior is normal  Judgment and thought content normal        Cardiographics  ECG: normal sinus rhythm  LV Ejection Fraction: LV ejection fraction >= 40%  Imaging:No results found      Lab Review  Lab Results   Component Value Date    CHOL 190 08/28/2015    CHOL 174 08/27/2014    TRIG 82 05/01/2019    TRIG 91 07/24/2018    TRIG 109 04/24/2017    TRIG 153 08/28/2015    TRIG 142 08/27/2014    HDL 45 05/01/2019    HDL 37 (L) 07/24/2018    HDL 41 04/24/2017    HDL 32 08/28/2015    HDL 39 08/27/2014     Lab Results   Component Value Date    K 3 9 05/01/2019     (H) 05/01/2019    CO2 30 05/01/2019    BUN 9 05/01/2019    CREATININE 1 01 05/01/2019    CALCIUM 8 3 05/01/2019     Lab Results   Component Value Date    WBC 6 58 07/24/2018    HGB 13 8 07/24/2018    HCT 42 2 07/24/2018    MCV 94 07/24/2018     07/24/2018     Lab Results   Component Value Date CHOL 190 08/28/2015    TRIG 82 05/01/2019    TRIG 153 08/28/2015    HDL 45 05/01/2019    HDL 32 08/28/2015     Assessment/Plan     1  Arteriosclerotic heart disease    2  Mixed hyperlipidemia    3  Severe obesity (BMI 35 0-39  9) with comorbidity ([address] )    4  BMI 37 0-37 9, adult        Coronary artery disease, well controlled  He is asymptomatic  Continue ASA and Rosuvastatin  Will refer to sports medicine for evaluation of back and leg pain which is limiting his ability to ambulate/exercise  Lab results and schedule of future lab studies reviewed with patient  Reviewed diet, exercise and weight control  Cardiovascular risk and specific lipid/LDL goals reviewed  Reviewed medications and side effects in detail  Use of aspirin to prevent MI and TIA's discussed  Call if any recurrence of chest pain or shortness of breath

## 2020-01-08 DIAGNOSIS — I21.11 ST ELEVATION (STEMI) MYOCARDIAL INFARCTION INVOLVING RIGHT CORONARY ARTERY (HCC): ICD-10-CM

## 2020-01-08 DIAGNOSIS — I25.10 ARTERIOSCLEROTIC HEART DISEASE: ICD-10-CM

## 2020-01-08 RX ORDER — ROSUVASTATIN CALCIUM 20 MG/1
TABLET, COATED ORAL
Qty: 90 TABLET | Refills: 0 | Status: SHIPPED | OUTPATIENT
Start: 2020-01-08 | End: 2020-05-05 | Stop reason: SDUPTHER

## 2020-02-03 ENCOUNTER — OFFICE VISIT (OUTPATIENT)
Dept: FAMILY MEDICINE CLINIC | Facility: CLINIC | Age: 60
End: 2020-02-03
Payer: COMMERCIAL

## 2020-02-03 ENCOUNTER — APPOINTMENT (OUTPATIENT)
Dept: RADIOLOGY | Facility: CLINIC | Age: 60
End: 2020-02-03
Payer: COMMERCIAL

## 2020-02-03 VITALS
TEMPERATURE: 97.2 F | SYSTOLIC BLOOD PRESSURE: 132 MMHG | HEART RATE: 64 BPM | WEIGHT: 286 LBS | RESPIRATION RATE: 22 BRPM | OXYGEN SATURATION: 98 % | BODY MASS INDEX: 38.74 KG/M2 | HEIGHT: 72 IN | DIASTOLIC BLOOD PRESSURE: 80 MMHG

## 2020-02-03 DIAGNOSIS — F33.0 MILD EPISODE OF RECURRENT MAJOR DEPRESSIVE DISORDER (HCC): ICD-10-CM

## 2020-02-03 DIAGNOSIS — J06.9 UPPER RESPIRATORY TRACT INFECTION, UNSPECIFIED TYPE: ICD-10-CM

## 2020-02-03 DIAGNOSIS — J06.9 UPPER RESPIRATORY TRACT INFECTION, UNSPECIFIED TYPE: Primary | ICD-10-CM

## 2020-02-03 DIAGNOSIS — Z11.59 NEED FOR HEPATITIS C SCREENING TEST: ICD-10-CM

## 2020-02-03 DIAGNOSIS — Z11.4 SCREENING FOR HIV (HUMAN IMMUNODEFICIENCY VIRUS): ICD-10-CM

## 2020-02-03 DIAGNOSIS — E78.2 MIXED HYPERLIPIDEMIA: ICD-10-CM

## 2020-02-03 PROCEDURE — 3008F BODY MASS INDEX DOCD: CPT | Performed by: FAMILY MEDICINE

## 2020-02-03 PROCEDURE — 71046 X-RAY EXAM CHEST 2 VIEWS: CPT

## 2020-02-03 PROCEDURE — 1036F TOBACCO NON-USER: CPT | Performed by: FAMILY MEDICINE

## 2020-02-03 PROCEDURE — 99214 OFFICE O/P EST MOD 30 MIN: CPT | Performed by: FAMILY MEDICINE

## 2020-02-03 RX ORDER — SULFAMETHOXAZOLE AND TRIMETHOPRIM 800; 160 MG/1; MG/1
1 TABLET ORAL EVERY 12 HOURS SCHEDULED
Qty: 20 TABLET | Refills: 0 | Status: SHIPPED | OUTPATIENT
Start: 2020-02-03 | End: 2020-02-13

## 2020-02-03 RX ORDER — BENZONATATE 200 MG/1
200 CAPSULE ORAL 3 TIMES DAILY PRN
Qty: 30 CAPSULE | Refills: 0 | Status: SHIPPED | OUTPATIENT
Start: 2020-02-03 | End: 2021-08-25 | Stop reason: ALTCHOICE

## 2020-02-03 RX ORDER — DESVENLAFAXINE 100 MG/1
100 TABLET, EXTENDED RELEASE ORAL DAILY
Qty: 90 TABLET | Refills: 1 | Status: SHIPPED | OUTPATIENT
Start: 2020-02-03 | End: 2020-04-21 | Stop reason: SDUPTHER

## 2020-02-03 NOTE — PROGRESS NOTES
Assessment/Plan:    1  Upper respiratory tract infection, unspecified type  -     sulfamethoxazole-trimethoprim (BACTRIM DS) 800-160 mg per tablet; Take 1 tablet by mouth every 12 (twelve) hours for 10 days  -     XR chest pa & lateral; Future; Expected date: 02/03/2020  -     benzonatate (TESSALON) 200 MG capsule; Take 1 capsule (200 mg total) by mouth 3 (three) times a day as needed for cough    2  Mild episode of recurrent major depressive disorder (HCC)  -     desvenlafaxine (PRISTIQ) 100 mg 24 hr tablet; Take 1 tablet (100 mg total) by mouth daily    3  Mixed hyperlipidemia  -     Comprehensive metabolic panel; Future  -     Lipid Panel with Direct LDL reflex; Future    4  Need for hepatitis C screening test  -     Hepatitis C antibody; Future    5  Screening for HIV (human immunodeficiency virus)  -     [facility]- Human Immunodeficiency Virus 1/2 Antigen / Antibody ( Fourth Generation) with Reflex Testing; Future            There are no Patient Instructions on file for this visit  Return in about 5 weeks (around 3/9/2020) for Annual physical     Subjective:      Patient ID: Estefani Hensley is a 64 y o  male      Chief Complaint   Patient presents with    Cough     symptoms for the past 1 1/2 weeks  [id number]    Shortness of Breath    Fatigue    Generalized Body Aches    Fever       Pt is here for a same day appt  Pt states he has been feeling poorkly for the last week  Bad cough seda in the am  Sometimes he feels like he is gioing to pass out  Cant sleep through the night he hacks when he inhales  He is weak  Pt states last week at work he was checked and his temp was 101 3      Pt states while he is here he finds he does get realkly agitated and thinks it his wellbutrin        The following portions of the patient's history were reviewed and updated as appropriate: allergies, current medications, past family history, past medical history, past social history, past surgical history and problem list     Review of Systems   Constitutional: Positive for chills and fatigue  Negative for activity change, appetite change, diaphoresis, fever and unexpected weight change  HENT: Positive for congestion  Negative for dental problem, ear pain, mouth sores, sinus pressure, sinus pain, sore throat and trouble swallowing  Eyes: Negative for photophobia, discharge and itching  Respiratory: Positive for cough  Negative for apnea, chest tightness and shortness of breath  Cardiovascular: Negative for chest pain, palpitations and leg swelling  Gastrointestinal: Negative for abdominal distention, abdominal pain, blood in stool, nausea and vomiting  Endocrine: Negative for cold intolerance, heat intolerance, polydipsia, polyphagia and polyuria  Genitourinary: Negative for difficulty urinating  Musculoskeletal: Negative for arthralgias  Skin: Negative for color change and wound  Neurological: Negative for dizziness, syncope, speech difficulty and headaches  Hematological: Negative for adenopathy  Psychiatric/Behavioral: Negative for agitation and behavioral problems  The patient is nervous/anxious            Current Outpatient Medications   Medication Sig Dispense Refill    aspirin 81 mg chewable tablet Chew 81 mg daily      Multiple Vitamins-Minerals (PX MENS MULTIVITAMINS) TABS Take 1 tablet by mouth daily      nitroglycerin (NITROSTAT) 0 4 mg SL tablet Place under the tongue      rosuvastatin (CRESTOR) 20 MG tablet TAKE ONE TABLET BY MOUTH EVERY DAY 90 tablet 0    benzonatate (TESSALON) 200 MG capsule Take 1 capsule (200 mg total) by mouth 3 (three) times a day as needed for cough 30 capsule 0    desvenlafaxine (PRISTIQ) 100 mg 24 hr tablet Take 1 tablet (100 mg total) by mouth daily 90 tablet 1    sulfamethoxazole-trimethoprim (BACTRIM DS) 800-160 mg per tablet Take 1 tablet by mouth every 12 (twelve) hours for 10 days 20 tablet 0     No current facility-administered medications for this visit  Objective:    /80   Pulse 64   Temp (!) 97 2 °F (36 2 °C)   Resp 22   Ht 6' (1 829 m)   Wt 130 kg (286 lb)   SpO2 98%   BMI 38 79 kg/m²        Physical Exam   Constitutional: He appears well-developed and well-nourished  No distress  HENT:   Head: Normocephalic and atraumatic  Right Ear: External ear normal  Tympanic membrane is bulging  Left Ear: External ear normal  Tympanic membrane is bulging  Nose: Mucosal edema present  Mouth/Throat: Oropharynx is clear and moist  No oropharyngeal exudate  Eyes: Pupils are equal, round, and reactive to light  EOM are normal  Right eye exhibits no discharge  Left eye exhibits no discharge  No scleral icterus  Neck: No thyromegaly present  Cardiovascular: Normal rate and normal heart sounds  No murmur heard  Pulmonary/Chest: Effort normal and breath sounds normal  No respiratory distress  He has no wheezes  Abdominal: Soft  Bowel sounds are normal  He exhibits no distension and no mass  There is no tenderness  There is no rebound and no guarding  Musculoskeletal: Normal range of motion  Neurological: He is alert  He displays normal reflexes  Coordination normal    Skin: Skin is warm and dry  No rash noted  He is not diaphoretic  No erythema  Psychiatric: His behavior is normal  His mood appears anxious  Nursing note and vitals reviewed               Syd Stroud DO

## 2020-02-10 ENCOUNTER — APPOINTMENT (OUTPATIENT)
Dept: LAB | Facility: CLINIC | Age: 60
End: 2020-02-10
Payer: COMMERCIAL

## 2020-02-10 DIAGNOSIS — E78.2 MIXED HYPERLIPIDEMIA: ICD-10-CM

## 2020-02-10 DIAGNOSIS — Z11.4 SCREENING FOR HIV (HUMAN IMMUNODEFICIENCY VIRUS): ICD-10-CM

## 2020-02-10 DIAGNOSIS — Z11.59 NEED FOR HEPATITIS C SCREENING TEST: ICD-10-CM

## 2020-02-10 LAB
ALBUMIN SERPL BCP-MCNC: 3.8 G/DL (ref 3.5–5)
ALP SERPL-CCNC: 68 U/L (ref 46–116)
ALT SERPL W P-5'-P-CCNC: 39 U/L (ref 12–78)
ANION GAP SERPL CALCULATED.3IONS-SCNC: 2 MMOL/L (ref 4–13)
AST SERPL W P-5'-P-CCNC: 14 U/L (ref 5–45)
BILIRUB SERPL-MCNC: 0.53 MG/DL (ref 0.2–1)
BUN SERPL-MCNC: 10 MG/DL (ref 5–25)
CALCIUM SERPL-MCNC: 8.8 MG/DL (ref 8.3–10.1)
CHLORIDE SERPL-SCNC: 107 MMOL/L (ref 100–108)
CHOLEST SERPL-MCNC: 106 MG/DL (ref 50–200)
CO2 SERPL-SCNC: 30 MMOL/L (ref 21–32)
CREAT SERPL-MCNC: 0.92 MG/DL (ref 0.6–1.3)
GFR SERPL CREATININE-BSD FRML MDRD: 92 ML/MIN/1.73SQ M
GLUCOSE P FAST SERPL-MCNC: 96 MG/DL (ref 65–99)
HCV AB SER QL: NORMAL
HDLC SERPL-MCNC: 37 MG/DL
LDLC SERPL CALC-MCNC: 52 MG/DL (ref 0–100)
POTASSIUM SERPL-SCNC: 4 MMOL/L (ref 3.5–5.3)
PROT SERPL-MCNC: 7 G/DL (ref 6.4–8.2)
SODIUM SERPL-SCNC: 139 MMOL/L (ref 136–145)
TRIGL SERPL-MCNC: 87 MG/DL

## 2020-02-10 PROCEDURE — 36415 COLL VENOUS BLD VENIPUNCTURE: CPT

## 2020-02-10 PROCEDURE — 87389 HIV-1 AG W/HIV-1&-2 AB AG IA: CPT

## 2020-02-10 PROCEDURE — 80053 COMPREHEN METABOLIC PANEL: CPT

## 2020-02-10 PROCEDURE — 80061 LIPID PANEL: CPT

## 2020-02-10 PROCEDURE — 86803 HEPATITIS C AB TEST: CPT

## 2020-02-11 LAB — HIV 1+2 AB+HIV1 P24 AG SERPL QL IA: NORMAL

## 2020-03-12 ENCOUNTER — OFFICE VISIT (OUTPATIENT)
Dept: FAMILY MEDICINE CLINIC | Facility: CLINIC | Age: 60
End: 2020-03-12
Payer: COMMERCIAL

## 2020-03-12 VITALS
SYSTOLIC BLOOD PRESSURE: 110 MMHG | HEART RATE: 62 BPM | HEIGHT: 72 IN | BODY MASS INDEX: 36.57 KG/M2 | WEIGHT: 270 LBS | TEMPERATURE: 97.5 F | RESPIRATION RATE: 16 BRPM | DIASTOLIC BLOOD PRESSURE: 70 MMHG | OXYGEN SATURATION: 97 %

## 2020-03-12 DIAGNOSIS — E66.9 OBESITY (BMI 35.0-39.9 WITHOUT COMORBIDITY): ICD-10-CM

## 2020-03-12 DIAGNOSIS — Z00.00 WELL ADULT EXAM: Primary | ICD-10-CM

## 2020-03-12 PROCEDURE — 99396 PREV VISIT EST AGE 40-64: CPT | Performed by: FAMILY MEDICINE

## 2020-03-12 NOTE — PATIENT INSTRUCTIONS
Obesity   AMBULATORY CARE:   Obesity  is when your body mass index (BMI) is greater than 30  Your healthcare provider will use your height and weight to measure your BMI  The risks of obesity include  many health problems, such as injuries or physical disability  You may need tests to check for the following:  · Diabetes     · High blood pressure or high cholesterol     · Heart disease     · Gallbladder or liver disease     · Cancer of the colon, breast, prostate, liver, or kidney     · Sleep apnea     · Arthritis or gout  Seek care immediately if:   · You have a severe headache, confusion, or difficulty speaking  · You have weakness on one side of your body  · You have chest pain, sweating, or shortness of breath  Contact your healthcare provider if:   · You have symptoms of gallbladder or liver disease, such as pain in your upper abdomen  · You have knee or hip pain and discomfort while walking  · You have symptoms of diabetes, such as intense hunger and thirst, and frequent urination  · You have symptoms of sleep apnea, such as snoring or daytime sleepiness  · You have questions or concerns about your condition or care  Treatment for obesity  focuses on helping you lose weight to improve your health  Even a small decrease in BMI can reduce the risk for many health problems  Your healthcare provider will help you set a weight-loss goal   · Lifestyle changes  are the first step in treating obesity  These include making healthy food choices and getting regular physical activity  Your healthcare provider may suggest a weight-loss program that involves coaching, education, and therapy  · Medicine  may help you lose weight when it is used with a healthy diet and physical activity  · Surgery  can help you lose weight if you are very obese and have other health problems  There are several types of weight-loss surgery  Ask your healthcare provider for more information    Be successful losing weight:   · Set small, realistic goals  An example of a small goal is to walk for 20 minutes 5 days a week  Anther goal is to lose 5% of your body weight  · Tell friends, family members, and coworkers about your goals  and ask for their support  Ask a friend to lose weight with you, or join a weight-loss support group  · Identify foods or triggers that may cause you to overeat , and find ways to avoid them  Remove tempting high-calorie foods from your home and workplace  Place a bowl of fresh fruit on your kitchen counter  If stress causes you to eat, then find other ways to cope with stress  · Keep a diary to track what you eat and drink  Also write down how many minutes of physical activity you do each day  Weigh yourself once a week and record it in your diary  Eating changes: You will need to eat 500 to 1,000 fewer calories each day than you currently eat to lose 1 to 2 pounds a week  The following changes will help you cut calories:  · Eat smaller portions  Use small plates, no larger than 9 inches in diameter  Fill your plate half full of fruits and vegetables  Measure your food using measuring cups until you know what a serving size looks like  · Eat 3 meals and 1 or 2 snacks each day  Plan your meals in advance  Jair Mustache and eat at home most of the time  Eat slowly  · Eat fruits and vegetables at every meal   They are low in calories and high in fiber, which makes you feel full  Do not add butter, margarine, or cream sauce to vegetables  Use herbs to season steamed vegetables  · Eat less fat and fewer fried foods  Eat more baked or grilled chicken and fish  These protein sources are lower in calories and fat than red meat  Limit fast food  Dress your salads with olive oil and vinegar instead of bottled dressing  · Limit the amount of sugar you eat  Do not drink sugary beverages  Limit alcohol  Activity changes:  Physical activity is good for your body in many ways   It helps you burn calories and build strong muscles  It decreases stress and depression, and improves your mood  It can also help you sleep better  Talk to your healthcare provider before you begin an exercise program   · Exercise for at least 30 minutes 5 days a week  Start slowly  Set aside time each day for physical activity that you enjoy and that is convenient for you  It is best to do both weight training and an activity that increases your heart rate, such as walking, bicycling, or swimming  · Find ways to be more active  Do yard work and housecleaning  Walk up the stairs instead of using elevators  Spend your leisure time going to events that require walking, such as outdoor festivals or fairs  This extra physical activity can help you lose weight and keep it off  Follow up with your healthcare provider as directed: You may need to meet with a dietitian  Write down your questions so you remember to ask them during your visits  © 2017 [employer] Information is for End User's use only and may not be sold, redistributed or otherwise used for commercial purposes  All illustrations and images included in CareNotes® are the copyrighted property of A [employer] M [employer] Inc  or Gilles Yost  The above information is an  only  It is not intended as medical advice for individual conditions or treatments  Talk to your doctor, nurse or pharmacist before following any medical regimen to see if it is safe and effective for you

## 2020-03-12 NOTE — PROGRESS NOTES
FAMILY PRACTICE HEALTH MAINTENANCE OFFICE VISIT  [facility]    NAME: Sylvia Bañuelos III  AGE: 64 y o  SEX: male  : 1960     DATE: 3/12/2020    Assessment and Plan     1  Well adult exam    2  Obesity (BMI 35 0-39 9 without comorbidity)    3  BMI 36 0-36 9,adult        · Patient Counseling:   · Nutrition: Stressed importance of a well balanced diet, moderation of sodium/saturated fat, caloric balance and sufficient intake of fiber  · Exercise: Stressed the importance of regular exercise with a goal of 150 minutes per week  · Dental Health: Discussed daily flossing and brushing and regular dental visits     · Immunizations reviewed: Up To Date  · Discussed benefits of:  Screening labs   BMI Counseling: Body mass index is 36 62 kg/m²  Discussed with patient's BMI with him  The BMI is above normal  Nutrition recommendations include reducing portion sizes  Return for Next scheduled follow up          Chief Complaint     Chief Complaint   Patient presents with    Physical Exam     [id number]       History of Present Illness     Pt is here for afull physical  Pt had labs      Well Adult Physical   Patient here for a comprehensive physical exam       Diet and Physical Activity  Diet: well balanced diet  Exercise: infrequently      Depression Screen  PHQ-9 Depression Screening    PHQ-9:    Frequency of the following problems over the past two weeks:       Little interest or pleasure in doing things:  0 - not at all  Feeling down, depressed, or hopeless:  0 - not at all  PHQ-2 Score:  0          General Health  Hearing: Normal:  bilateral  Vision: no vision problems  Dental: regular dental visits    Reproductive Health  No issues       The following portions of the patient's history were reviewed and updated as appropriate: allergies, current medications, past family history, past medical history, past social history, past surgical history and problem list     Review of Systems     Review of Systems   Constitutional: Negative for activity change, appetite change, chills, diaphoresis, fatigue, fever and unexpected weight change  HENT: Negative for congestion, dental problem, ear pain, mouth sores, sinus pressure, sinus pain, sore throat and trouble swallowing  Eyes: Negative for photophobia, discharge and itching  Respiratory: Negative for apnea, chest tightness and shortness of breath  Cardiovascular: Negative for chest pain, palpitations and leg swelling  Gastrointestinal: Negative for abdominal distention, abdominal pain, blood in stool, nausea and vomiting  Endocrine: Negative for cold intolerance, heat intolerance, polydipsia, polyphagia and polyuria  Genitourinary: Negative for difficulty urinating  Musculoskeletal: Negative for arthralgias  Skin: Negative for color change and wound  Neurological: Negative for dizziness, syncope, speech difficulty and headaches  Hematological: Negative for adenopathy  Psychiatric/Behavioral: Negative for agitation and behavioral problems  Past Medical History     Past Medical History:   Diagnosis Date    Coronary artery disease     Depression     Dermoid cyst of face     resolved 04/19/2016    Hyperlipidemia     Myocardial infarction ([facility] [address] ) 04/15/2016    99% RCA block    Presenile dementia, depressed type ([facility] [address] )     onst 11/03/2009, resolved 07/21/2016    Psychiatric disorder     depression       Past Surgical History     Past Surgical History:   Procedure Laterality Date    APPENDECTOMY      COLONOSCOPY N/A 12/14/2016    Procedure: COLONOSCOPY;  Surgeon: Semaj Roach MD;  Location: [place] [address] GI LAB; Service:     CORONARY ANGIOPLASTY WITH STENT PLACEMENT Right 04/15/2016    ESOPHAGOGASTRODUODENOSCOPY N/A 12/14/2016    Procedure: ESOPHAGOGASTRODUODENOSCOPY (EGD); Surgeon: Semaj Roach MD;  Location[facility] GI LAB;   Service:        Social History     Social History     Socioeconomic History    Marital status: /Civil Union     Spouse name: None    Number of children: None    Years of education: None    Highest education level: None   Occupational History    None   Social Needs    Financial resource strain: None    Food insecurity:     Worry: None     Inability: None    Transportation needs:     Medical: None     Non-medical: None   Tobacco Use    Smoking status: Former Smoker     Packs/day: 0 50     Years: 15 00     Pack years: 7 50     Types: Cigarettes     Last attempt to quit: 4/15/2016     Years since quitting: 3 9    Smokeless tobacco: Never Used   Substance and Sexual Activity    Alcohol use: Yes     Frequency: 2-4 times a month     Drinks per session: 3 or 4     Comment: 2-3 beers every cpl days    Drug use: Yes     Frequency: 2 0 times per week     Types: Marijuana     Comment: daily     Sexual activity: None   Lifestyle    Physical activity:     Days per week: None     Minutes per session: None    Stress: None   Relationships    Social connections:     Talks on phone: None     Gets together: None     Attends Restorationism service: None     Active member of club or organization: None     Attends meetings of clubs or organizations: None     Relationship status: None    Intimate partner violence:     Fear of current or ex partner: None     Emotionally abused: None     Physically abused: None     Forced sexual activity: None   Other Topics Concern    None   Social History Narrative    Lack of exercise    Pets/animals: Dog    Sleeps 8-10 hours a day               Family History     Family History   Problem Relation Age of Onset    Arthritis Mother     Hypertension Mother     Hypertension Father     Heart disease Father     Coronary artery disease Father     Depression Father     Hypertension Brother     Cancer Maternal Grandfather        Current Medications       Current Outpatient Medications:     aspirin 81 mg chewable tablet, Chew 81 mg daily, Disp: , Rfl:     desvenlafaxine (PRISTIQ) 100 mg 24 hr tablet, Take 1 tablet (100 mg total) by mouth daily, Disp: 90 tablet, Rfl: 1    Multiple Vitamins-Minerals (PX MENS MULTIVITAMINS) TABS, Take 1 tablet by mouth daily, Disp: , Rfl:     nitroglycerin (NITROSTAT) 0 4 mg SL tablet, Place under the tongue, Disp: , Rfl:     rosuvastatin (CRESTOR) 20 MG tablet, TAKE ONE TABLET BY MOUTH EVERY DAY, Disp: 90 tablet, Rfl: 0    benzonatate (TESSALON) 200 MG capsule, Take 1 capsule (200 mg total) by mouth 3 (three) times a day as needed for cough (Patient not taking: Reported on 3/12/2020), Disp: 30 capsule, Rfl: 0     Allergies     Allergies   Allergen Reactions    Amoxicillin Itching    Lipitor [Atorvastatin]        Objective     /70   Pulse 62   Temp 97 5 °F (36 4 °C)   Resp 16   Ht 6' (1 829 m)   Wt 122 kg (270 lb)   SpO2 97%   BMI 36 62 kg/m²      Physical Exam   Constitutional: He appears well-developed and well-nourished  No distress  HENT:   Head: Normocephalic and atraumatic  Right Ear: External ear normal    Left Ear: External ear normal    Nose: Nose normal    Mouth/Throat: Oropharynx is clear and moist  No oropharyngeal exudate  Eyes: Pupils are equal, round, and reactive to light  EOM are normal  Right eye exhibits no discharge  Left eye exhibits no discharge  No scleral icterus  Neck: No thyromegaly present  Cardiovascular: Normal rate and normal heart sounds  No murmur heard  Pulmonary/Chest: Effort normal and breath sounds normal  No respiratory distress  He has no wheezes  Abdominal: Soft  Bowel sounds are normal  He exhibits no distension and no mass  There is no tenderness  There is no rebound and no guarding  Genitourinary: Prostate normal    Musculoskeletal: Normal range of motion  Neurological: He is alert  He displays normal reflexes  Coordination normal    Skin: Skin is warm and dry  No rash noted  He is not diaphoretic  No erythema  Psychiatric: He has a normal mood and affect   His behavior is normal    Nursing note and vitals reviewed  No exam data present    Recent Results (from the past 2016 hour(s))   Comprehensive metabolic panel    Collection Time: 02/10/20 10:04 AM   Result Value Ref Range    Sodium 139 136 - 145 mmol/L    Potassium 4 0 3 5 - 5 3 mmol/L    Chloride 107 100 - 108 mmol/L    CO2 30 21 - 32 mmol/L    ANION GAP 2 (L) 4 - 13 mmol/L    BUN 10 5 - 25 mg/dL    Creatinine 0 92 0 60 - 1 30 mg/dL    Glucose, Fasting 96 65 - 99 mg/dL    Calcium 8 8 8 3 - 10 1 mg/dL    AST 14 5 - 45 U/L    ALT 39 12 - 78 U/L    Alkaline Phosphatase 68 46 - 116 U/L    Total Protein 7 0 6 4 - 8 2 g/dL    Albumin 3 8 3 5 - 5 0 g/dL    Total Bilirubin 0 53 0 20 - 1 00 mg/dL    eGFR 92 ml/min/1 73sq m   Lipid Panel with Direct LDL reflex    Collection Time: 02/10/20 10:04 AM   Result Value Ref Range    Cholesterol 106 50 - 200 mg/dL    Triglycerides 87 <=150 mg/dL    HDL, Direct 37 (L) >=40 mg/dL    LDL Calculated 52 0 - 100 mg/dL   Hepatitis C antibody    Collection Time: 02/10/20 10:04 AM   Result Value Ref Range    Hepatitis C Ab Non-reactive Non-reactive   HIV 1/2 AG-AB combo    Collection Time: 02/10/20 10:04 AM   Result Value Ref Range    HIV-1/HIV-2 Ab Non-Reactive Non-Reactive     Body mass index is 36 62 kg/m²  [facility]on [facility]  BMI Counseling: Body mass index is 36 62 kg/m²  The BMI is above normal  Nutrition recommendations include reducing portion sizes

## 2020-04-21 DIAGNOSIS — F33.0 MILD EPISODE OF RECURRENT MAJOR DEPRESSIVE DISORDER (HCC): ICD-10-CM

## 2020-04-22 RX ORDER — DESVENLAFAXINE 100 MG/1
100 TABLET, EXTENDED RELEASE ORAL DAILY
Qty: 90 TABLET | Refills: 1 | Status: SHIPPED | OUTPATIENT
Start: 2020-04-22 | End: 2020-11-23 | Stop reason: SDUPTHER

## 2020-05-05 DIAGNOSIS — I25.10 ARTERIOSCLEROTIC HEART DISEASE: ICD-10-CM

## 2020-05-05 DIAGNOSIS — I21.11 ST ELEVATION (STEMI) MYOCARDIAL INFARCTION INVOLVING RIGHT CORONARY ARTERY (HCC): ICD-10-CM

## 2020-05-05 RX ORDER — ROSUVASTATIN CALCIUM 20 MG/1
20 TABLET, COATED ORAL DAILY
Qty: 90 TABLET | Refills: 3 | Status: SHIPPED | OUTPATIENT
Start: 2020-05-05 | End: 2021-08-25 | Stop reason: SDUPTHER

## 2020-06-16 ENCOUNTER — TELEPHONE (OUTPATIENT)
Dept: CARDIOLOGY CLINIC | Facility: CLINIC | Age: 60
End: 2020-06-16

## 2020-06-16 ENCOUNTER — OFFICE VISIT (OUTPATIENT)
Dept: CARDIOLOGY CLINIC | Facility: CLINIC | Age: 60
End: 2020-06-16
Payer: COMMERCIAL

## 2020-06-16 VITALS
HEART RATE: 57 BPM | OXYGEN SATURATION: 96 % | WEIGHT: 281 LBS | TEMPERATURE: 96.9 F | BODY MASS INDEX: 38.06 KG/M2 | HEIGHT: 72 IN | DIASTOLIC BLOOD PRESSURE: 70 MMHG | SYSTOLIC BLOOD PRESSURE: 110 MMHG

## 2020-06-16 DIAGNOSIS — E78.2 MIXED HYPERLIPIDEMIA: ICD-10-CM

## 2020-06-16 DIAGNOSIS — I25.10 ARTERIOSCLEROTIC HEART DISEASE: Primary | ICD-10-CM

## 2020-06-16 DIAGNOSIS — E66.01 SEVERE OBESITY (BMI 35.0-39.9) WITH COMORBIDITY (HCC): ICD-10-CM

## 2020-06-16 PROCEDURE — 3008F BODY MASS INDEX DOCD: CPT | Performed by: INTERNAL MEDICINE

## 2020-06-16 PROCEDURE — 93000 ELECTROCARDIOGRAM COMPLETE: CPT | Performed by: INTERNAL MEDICINE

## 2020-06-16 PROCEDURE — 1036F TOBACCO NON-USER: CPT | Performed by: INTERNAL MEDICINE

## 2020-06-16 PROCEDURE — 99214 OFFICE O/P EST MOD 30 MIN: CPT | Performed by: INTERNAL MEDICINE

## 2020-11-23 DIAGNOSIS — F33.0 MILD EPISODE OF RECURRENT MAJOR DEPRESSIVE DISORDER (HCC): ICD-10-CM

## 2020-11-24 RX ORDER — DESVENLAFAXINE 100 MG/1
100 TABLET, EXTENDED RELEASE ORAL DAILY
Qty: 90 TABLET | Refills: 1 | Status: SHIPPED | OUTPATIENT
Start: 2020-11-24 | End: 2021-07-01 | Stop reason: SDUPTHER

## 2020-12-18 ENCOUNTER — OFFICE VISIT (OUTPATIENT)
Dept: CARDIOLOGY CLINIC | Facility: CLINIC | Age: 60
End: 2020-12-18
Payer: COMMERCIAL

## 2020-12-18 VITALS
HEART RATE: 68 BPM | WEIGHT: 282 LBS | TEMPERATURE: 99.6 F | HEIGHT: 72 IN | OXYGEN SATURATION: 97 % | DIASTOLIC BLOOD PRESSURE: 84 MMHG | SYSTOLIC BLOOD PRESSURE: 134 MMHG | BODY MASS INDEX: 38.19 KG/M2

## 2020-12-18 DIAGNOSIS — I25.10 ARTERIOSCLEROTIC HEART DISEASE: Primary | ICD-10-CM

## 2020-12-18 DIAGNOSIS — E66.01 SEVERE OBESITY (BMI 35.0-39.9) WITH COMORBIDITY (HCC): ICD-10-CM

## 2020-12-18 DIAGNOSIS — I25.2 HISTORY OF ST ELEVATION MYOCARDIAL INFARCTION (STEMI): ICD-10-CM

## 2020-12-18 DIAGNOSIS — E78.2 MIXED HYPERLIPIDEMIA: ICD-10-CM

## 2020-12-18 DIAGNOSIS — I25.10 CAD IN NATIVE ARTERY: ICD-10-CM

## 2020-12-18 PROCEDURE — 99214 OFFICE O/P EST MOD 30 MIN: CPT | Performed by: INTERNAL MEDICINE

## 2020-12-18 PROCEDURE — 93000 ELECTROCARDIOGRAM COMPLETE: CPT | Performed by: INTERNAL MEDICINE

## 2020-12-18 RX ORDER — CLINDAMYCIN HYDROCHLORIDE 300 MG/1
300 CAPSULE ORAL EVERY 8 HOURS
COMMUNITY
Start: 2020-12-08

## 2020-12-18 RX ORDER — FOLIC ACID 1 MG/1
1 TABLET ORAL DAILY
COMMUNITY

## 2021-01-12 ENCOUNTER — IMMUNIZATIONS (OUTPATIENT)
Dept: FAMILY MEDICINE CLINIC | Facility: HOSPITAL | Age: 61
End: 2021-01-12

## 2021-01-12 DIAGNOSIS — Z23 ENCOUNTER FOR IMMUNIZATION: ICD-10-CM

## 2021-01-12 PROCEDURE — 91301 SARS-COV-2 / COVID-19 MRNA VACCINE (MODERNA) 100 MCG: CPT

## 2021-01-12 PROCEDURE — 0011A SARS-COV-2 / COVID-19 MRNA VACCINE (MODERNA) 100 MCG: CPT

## 2021-01-22 ENCOUNTER — OFFICE VISIT (OUTPATIENT)
Dept: URGENT CARE | Facility: CLINIC | Age: 61
End: 2021-01-22
Payer: COMMERCIAL

## 2021-01-22 VITALS
BODY MASS INDEX: 37.71 KG/M2 | TEMPERATURE: 97.6 F | RESPIRATION RATE: 18 BRPM | DIASTOLIC BLOOD PRESSURE: 75 MMHG | WEIGHT: 278.4 LBS | HEART RATE: 74 BPM | SYSTOLIC BLOOD PRESSURE: 147 MMHG | OXYGEN SATURATION: 98 % | HEIGHT: 72 IN

## 2021-01-22 DIAGNOSIS — G89.29 CHRONIC LEFT-SIDED LOW BACK PAIN WITH LEFT-SIDED SCIATICA: Primary | ICD-10-CM

## 2021-01-22 DIAGNOSIS — M54.42 CHRONIC LEFT-SIDED LOW BACK PAIN WITH LEFT-SIDED SCIATICA: Primary | ICD-10-CM

## 2021-01-22 PROCEDURE — G0382 LEV 3 HOSP TYPE B ED VISIT: HCPCS | Performed by: FAMILY MEDICINE

## 2021-01-22 RX ORDER — PREDNISONE 10 MG/1
TABLET ORAL
Qty: 20 TABLET | Refills: 0 | Status: SHIPPED | OUTPATIENT
Start: 2021-01-22 | End: 2021-08-25 | Stop reason: ALTCHOICE

## 2021-01-22 RX ORDER — METHOCARBAMOL 750 MG/1
TABLET, FILM COATED ORAL
Qty: 30 TABLET | Refills: 0 | Status: SHIPPED | OUTPATIENT
Start: 2021-01-22 | End: 2021-08-25 | Stop reason: ALTCHOICE

## 2021-01-22 NOTE — PATIENT INSTRUCTIONS
--Tylenol, moist heat, OTC pain patch (lidocaine, Salonpas, etc)  --Rx muscle relaxant (Robaxin) in place of Flexeril  Use with caution as may cause tiredness  --Rx oral steroid (prenisone) as ordered  --Avoid heavy lifting, prolonged sitting, frequent turning, other potentially exacerbating activities  --Physical therapy  --Follow-up with PCP if no improvement/worsening with these measures-->may warrant MRI, spine/pain referral    --Go to ER for worsening pain, numbness/weakness, bowel/bladder changes

## 2021-01-22 NOTE — PROGRESS NOTES
[facility]        NAME: Yomi Bearden is a 64 y o  male  : 1960    MRN: [id number]  DATE: 2021  TIME: 12:15 PM    Assessment and Plan   Chronic left-sided low back pain with left-sided sciatica [M54 42, G89 29]  1  Chronic left-sided low back pain with left-sided sciatica  Ambulatory Referral to Comprehensive Spine Program    predniSONE 10 mg tablet    methocarbamol (ROBAXIN) 750 mg tablet     --Likely musculo-ligamentous, but given chronic nature of symptoms including left leg numbness, need to consider HNP, other etiology  --Avoid NSAIDs d/t patient history of CAD/MI  No history of diabetes noted  Patient Instructions       --Tylenol, moist heat, OTC pain patch (lidocaine, Salonpas, etc)  --Rx muscle relaxant (Robaxin) in place of Flexeril  Use with caution as may cause tiredness  --Rx oral steroid (prenisone) as ordered  --Avoid heavy lifting, prolonged sitting, frequent turning, other potentially exacerbating activities  --Physical therapy  --Follow-up with PCP if no improvement/worsening with these measures-->may warrant MRI, spine/pain referral    --Go to ER for worsening pain, numbness/weakness, bowel/bladder changes  Chief Complaint     Chief Complaint   Patient presents with    Sciatic pain     PAtient complains of sciatic pain which has been re-occuring for the past year  Has mentioned it to PCP and he has tried medication but nothing has helpped  History of Present Illness         Here with complaints of worsening chronic/intermittent left lower back pain over the past 4 days  "Burning", "aching" sensation  Radiates to left hip at times, but not elsewhere  Did feel some mild discomfort in his right groin a couple days ago  That is now resolved, however  Rates 7/10 at present  Tylenol, Flexeril not really helping  Tried OTC pain patch without much improvement either  No known precipitating injuries     Works as  for [employer]    Baseline left leg numbness--no worse  No acute weakness  Denies bladder changes, saddle anesthesia  Mentioned back pain, leg numbness to his PCP previously  Referred to PT, but he admits to not going  No imaging ordered  Review of Systems   Review of Systems   Constitutional: Negative for fever  Respiratory: Negative for shortness of breath  Genitourinary: Negative for difficulty urinating  Musculoskeletal:        Per HPI   Neurological: Positive for numbness  Negative for weakness           Current Medications       Current Outpatient Medications:     aspirin 81 mg chewable tablet, Chew 81 mg daily, Disp: , Rfl:     clindamycin (CLEOCIN) 300 MG capsule, Take 300 mg by mouth every 8 (eight) hours, Disp: , Rfl:     co-enzyme Q-10 30 MG capsule, Take 50 mg by mouth 3 (three) times a day, Disp: , Rfl:     desvenlafaxine (PRISTIQ) 100 mg 24 hr tablet, Take 1 tablet (100 mg total) by mouth daily, Disp: 90 tablet, Rfl: 1    folic acid (FOLVITE) 1 mg tablet, Take 1 mg by mouth daily, Disp: , Rfl:     Multiple Vitamins-Minerals (PX MENS MULTIVITAMINS) TABS, Take 1 tablet by mouth daily, Disp: , Rfl:     nitroglycerin (NITROSTAT) 0 4 mg SL tablet, Place under the tongue, Disp: , Rfl:     rosuvastatin (CRESTOR) 20 MG tablet, Take 1 tablet (20 mg total) by mouth daily, Disp: 90 tablet, Rfl: 3    benzonatate (TESSALON) 200 MG capsule, Take 1 capsule (200 mg total) by mouth 3 (three) times a day as needed for cough (Patient not taking: Reported on 12/18/2020), Disp: 30 capsule, Rfl: 0    methocarbamol (ROBAXIN) 750 mg tablet, Take 1-2 tablets by mouth every 6 hours as needed for muscle pain/spasm, Disp: 30 tablet, Rfl: 0    predniSONE 10 mg tablet, TAKE 4 TAB PO DAILY X 2 DAYS, THEN 3 TAB DAILY X 2 DAYS, THEN 2 TAB DAILY X 2 DAYS, THEN 1 TAB DAILY X 2 DAYS, WITH FOOD, Disp: 20 tablet, Rfl: 0    Current Allergies     Allergies as of 01/22/2021 - Reviewed 01/22/2021   Allergen Reaction Noted    Amoxicillin Itching 04/15/2016    Lipitor [atorvastatin]  12/13/2016            The following portions of the patient's history were reviewed and updated as appropriate: allergies, current medications, past family history, past medical history, past social history, past surgical history and problem list      Past Medical History:   Diagnosis Date    Coronary artery disease     Depression     Dermoid cyst of face     resolved 04/19/2016    Hyperlipidemia     Myocardial infarction ([facility] [address] ) 04/15/2016    99% RCA block    Presenile dementia, depressed type ([facility] [address] )     onst 11/03/2009, resolved 07/21/2016    Psychiatric disorder     depression       Past Surgical History:   Procedure Laterality Date    APPENDECTOMY      COLONOSCOPY N/A 12/14/2016    Procedure: COLONOSCOPY;  Surgeon: Arcelia Quintero MD;  Location: [facility] GI LAB; Service:     CORONARY ANGIOPLASTY WITH STENT PLACEMENT Right 04/15/2016    ESOPHAGOGASTRODUODENOSCOPY N/A 12/14/2016    Procedure: ESOPHAGOGASTRODUODENOSCOPY (EGD); Surgeon: Arcelia Quintero MD;  Location: [facility] GI LAB; Service:        Family History   Problem Relation Age of Onset    Arthritis Mother     Hypertension Mother     Hypertension Father     Heart disease Father     Coronary artery disease Father     Depression Father     Hypertension Brother     Cancer Maternal Grandfather          Medications have been verified  Objective   /75   Pulse 74   Temp 97 6 °F (36 4 °C)   Resp 18   Ht 6' (1 829 m)   Wt 126 kg (278 lb 6 4 oz)   SpO2 98%   BMI 37 76 kg/m²   No LMP for male patient  Physical Exam     Physical Exam  Constitutional:       Appearance: He is not ill-appearing  Comments: Mildly uncomfortable appearance at rest   Favoring back  Cardiovascular:      Rate and Rhythm: Normal rate and regular rhythm  Pulmonary:      Effort: Pulmonary effort is normal       Breath sounds: Normal breath sounds     Musculoskeletal: General: Tenderness present  No swelling, deformity or signs of injury  Comments: Left lumbar PVM  tenderness extending to axial lumbar spine, but not elsewhere  Some decreased lumbar lordosis  No visualized or palpable abnormality otherwise  Decreased spine ROM 50% all directions with pain  Equivocal left supine SLR  Normal, painless hip ROM  5/5 LE strength bilaterally  2+ patellar DTR's  Mild antalgic gait  Uses cane  Skin:     Findings: No rash  Neurological:      Mental Status: He is alert  Sensory: No sensory deficit  Motor: No weakness        Deep Tendon Reflexes: Reflexes normal    Psychiatric:         Mood and Affect: Mood normal

## 2021-01-27 ENCOUNTER — TELEPHONE (OUTPATIENT)
Dept: PHYSICAL THERAPY | Facility: OTHER | Age: 61
End: 2021-01-27

## 2021-01-27 NOTE — TELEPHONE ENCOUNTER
Nurse reached out to discuss recent referral entered for  Comprehensive Spine program and offerings  Approved contact, wife answered and nurse discussed reason for call, triage and evaluation process with her  Patient is currently unavailable (at work), but wife will give him message  Nurse reminded her that pt will need to call main number (provided) and leave full name,  and CB will come from a NON- number  Agreed and appreciative of call and information  Nurse wished them well and referral closed per protocol  Will await CB from patient

## 2021-01-28 ENCOUNTER — NURSE TRIAGE (OUTPATIENT)
Dept: PHYSICAL THERAPY | Facility: OTHER | Age: 61
End: 2021-01-28

## 2021-01-28 DIAGNOSIS — M54.50 ACUTE EXACERBATION OF CHRONIC LOW BACK PAIN: Primary | ICD-10-CM

## 2021-01-28 DIAGNOSIS — G89.29 ACUTE EXACERBATION OF CHRONIC LOW BACK PAIN: Primary | ICD-10-CM

## 2021-01-28 NOTE — TELEPHONE ENCOUNTER
Additional Information   Negative: Is this related to a work injury?  Negative: Is this related to an MVA?  Negative: Are you currently recieving homecare services?  Negative: Has the patient had unexplained weight loss?  Negative: Does the patient have a fever?  Negative: Is the patient experiencing urine retention?  Negative: Is the patient experiencing acute drop foot or paralysis?  Negative: Has the patient experienced major trauma? (fall from height, high speed collision, direct blow to spine) and is also experiencing nausea, light-headedness, or loss of consciousness?  Negative: Is the patient experiencing blood in sputum?  Negative: Is this a chronic condition? Background - Initial Assessment  Clinical complaint: lower left back pain which radiates down the the left knee  States he has numbness in the left leg to the knee  Patient states he has had back issues for a few years and that he did have a consult with Dr Jay Adame in the past  States he has not done PT  Date of onset: 2 weeks ago  Frequency of pain: constant intensifies with movement  Quality of pain: aching, burning, sharp and shooting    Protocols used: [facility] COMPREHENSIVE SPINE PROGRAM PROTOCOL    This RN did review in detail the Comprehensive Spine Program and what we can provide for their back pain  Patient is agreeable to being triaged by this RN and would like to proceed with Physical Therapy  Referral was placed for Physical Therapy at the [facility] site  Patients information was sent to the  to make evaluation appointment  Patient made aware that the PT office  will be calling to schedule the appointment  Patient was provided with the phone number to the PT office  Patient made aware per protocol, a referral would be entered to [facility] [place], operated by [facility] based on the triage intake assessment questions   The goal is to take care of patient's emotional well-being in addition to the physical well-being  Patient aware that someone from [facility] will reaching out to them with a phone call to discuss options and services if needed  Patient verbalized understanding of referral     Behavioral referral was sent and the patient is aware that they will be receiving a phone call from that office  No further questions and/or concerns were voiced by the patient at this time  Patient states understanding of the referral that was placed

## 2021-02-10 ENCOUNTER — IMMUNIZATIONS (OUTPATIENT)
Dept: FAMILY MEDICINE CLINIC | Facility: HOSPITAL | Age: 61
End: 2021-02-10

## 2021-02-10 DIAGNOSIS — Z23 ENCOUNTER FOR IMMUNIZATION: Primary | ICD-10-CM

## 2021-02-10 PROCEDURE — 91301 SARS-COV-2 / COVID-19 MRNA VACCINE (MODERNA) 100 MCG: CPT

## 2021-02-10 PROCEDURE — 0012A SARS-COV-2 / COVID-19 MRNA VACCINE (MODERNA) 100 MCG: CPT

## 2021-02-17 ENCOUNTER — EVALUATION (OUTPATIENT)
Dept: PHYSICAL THERAPY | Facility: CLINIC | Age: 61
End: 2021-02-17
Payer: COMMERCIAL

## 2021-02-17 DIAGNOSIS — G89.29 ACUTE EXACERBATION OF CHRONIC LOW BACK PAIN: Primary | ICD-10-CM

## 2021-02-17 DIAGNOSIS — M54.50 ACUTE EXACERBATION OF CHRONIC LOW BACK PAIN: Primary | ICD-10-CM

## 2021-02-17 PROCEDURE — 97162 PT EVAL MOD COMPLEX 30 MIN: CPT | Performed by: PHYSICAL THERAPIST

## 2021-02-17 NOTE — PROGRESS NOTES
PT Evaluation     Today's date: 2021  Patient name: Madonna Shetty  : 1960  MRN: [id number]  Referring provider: Sherine Shay PT  Dx:   Encounter Diagnosis     ICD-10-CM    1  Acute exacerbation of chronic low back pain  M54 5 Ambulatory referral to PT spine    G89 [id number]                   Assessment  Assessment details: Madonna Shetty is a 64 y o  male who presents to physical therapy with pain, decreased LE range of motion, decreased LE strength, decreased lumbar range of motion, impaired function, decreased activity tolerance, poor posture and poor body mechanics  Patient's clinical presentation is consistent with their referring diagnosis of Acute exacerbation of chronic low back pain  (primary encounter diagnosis)  The pt presents with functional limitations of ADLs, work-related activities, performing household chores and self-care  Pt would benefit from physical therapy services to address these limitations and maximize function  Pt was instructed and educated on home exercise program today and demonstrates understanding  Impairments: abnormal gait, abnormal muscle firing, abnormal muscle tone, abnormal or restricted ROM, abnormal movement, activity intolerance, impaired balance, impaired physical strength, lacks appropriate home exercise program, pain with function, weight-bearing intolerance, poor posture  and poor body mechanics  Understanding of Dx/Px/POC: good   Prognosis: good    Goals  Short term goals:  (3 weeks)  1  Patient will have pain level 2/10 or less in lumbar spine with ADL's   2  Patient will report a 50% improvement in symptoms with ADL's  3  Patient will have normal lower extremity ROM  4  Patient will demonstrate correct sitting posture      Long term goals:  (6 weeks)  1  Patient will demonstrate a reduction in tenderness and tension in hypertonic musculature  2  Patient will report 85 % improvement improvement in symptoms with ADL's  3   Patient will have no complaints of pain with walking  4  Patient will be independent in a comprehensive home exercise program       Plan  Patient would benefit from: PT eval and skilled physical therapy  Planned modality interventions: thermotherapy: hydrocollator packs and cryotherapy  Planned therapy interventions: ADL training, balance/weight bearing training, joint mobilization, manual therapy, massage, neuromuscular re-education, patient education, postural training, strengthening, stretching, functional ROM exercises, therapeutic activities, therapeutic exercise, gait training, home exercise program and abdominal trunk stabilization  Frequency: 2x week  Duration in visits: 12  Duration in weeks: 6  Treatment plan discussed with: patient        Subjective Evaluation    History of Present Illness  Mechanism of injury: He reports many years ago he had LBP at work  He would go to [employer] and feel better  This would happen every other year  He began having pain again 8 weeks ago  He went to [facility]  He was given steroids  He was then referred to PT  Pain  Current pain ratin  At best pain ratin  At worst pain rating: 10  Quality: dull ache and sharp  Relieving factors: change in position, ice, heat and medications  Aggravating factors: lifting    Social Support    Employment status: working (Recreational therapist)  Exercise history: Musician    Patient Goals  Patient goals for therapy: return to sport/leisure activities and independence with ADLs/IADLs          Objective     Postural Observations  Seated posture: poor    Additional Postural Observation Details  Patient has slouched posture in sitting      Palpation   Left   Hypertonic in the lumbar paraspinals and quadratus lumborum  Tenderness of the lumbar paraspinals and quadratus lumborum  Right   Hypertonic in the lumbar paraspinals and quadratus lumborum  Tenderness of the lumbar paraspinals and quadratus lumborum       Neurological Testing Sensation     Lumbar   Left   Diminished: light touch    Right   Intact: light touch    Active Range of Motion     Lumbar   Flexion:  with pain Restriction level: minimal  Extension:  Restriction level: maximal  Left lateral flexion:  with pain Restriction level: moderate  Right lateral flexion:  Restriction level: minimal  Left rotation:  Restriction level: minimal  Right rotation:  Restriction level: minimal    Additional Active Range of Motion Details  He had increased LBP with flexion and he had decrease LBP with extension      Strength/Myotome Testing     Left Hip   Planes of Motion   Flexion: 4+    Right Hip   Planes of Motion   Flexion: 5    Left Knee   Flexion: 4+  Extension: 4+    Right Knee   Flexion: 5  Extension: 5    Left Ankle/Foot   Dorsiflexion: 4+  Plantar flexion: 4+    Right Ankle/Foot   Dorsiflexion: 5  Plantar flexion: 5    Tests     Additional Tests Details  He felt relief from LBP with double leg pull      Flowsheet Rows      Most Recent Value   PT/OT G-Codes   Current Score  42   Projected Score  62             Precautions:  MI 4 years ago with stent placement        Manuals 2/17/21       Visit #        STM to QL        Leg pull        HS stretch                Warm-up        NuStep        Neuro Re-Ed        TA brace        Glute set                                Ther Ex        Prone on elbow        Clamshell        Hip hinge w/ cane                                Ther Activity                        Gait Training                        Modalities        Traction table

## 2021-02-18 ENCOUNTER — HOSPITAL ENCOUNTER (EMERGENCY)
Facility: HOSPITAL | Age: 61
Discharge: HOME | End: 2021-02-18
Attending: EMERGENCY MEDICINE | Admitting: EMERGENCY MEDICINE
Payer: OTHER MISCELLANEOUS

## 2021-02-18 ENCOUNTER — APPOINTMENT (EMERGENCY)
Dept: CT IMAGING | Facility: HOSPITAL | Age: 61
End: 2021-02-18
Payer: OTHER MISCELLANEOUS

## 2021-02-18 VITALS
SYSTOLIC BLOOD PRESSURE: 132 MMHG | HEART RATE: 54 BPM | BODY MASS INDEX: 37.67 KG/M2 | DIASTOLIC BLOOD PRESSURE: 75 MMHG | RESPIRATION RATE: 16 BRPM | OXYGEN SATURATION: 96 % | WEIGHT: 277.78 LBS | TEMPERATURE: 97.7 F
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
## 2021-02-18 DIAGNOSIS — S16.1XXA STRAIN OF NECK MUSCLE, INITIAL ENCOUNTER: ICD-10-CM

## 2021-02-18 DIAGNOSIS — S39.012A STRAIN OF LUMBAR REGION, INITIAL ENCOUNTER: ICD-10-CM

## 2021-02-18 LAB
ALBUMIN SERPL BCP-MCNC: 3.8 G/DL (ref 3.5–5)
ALP SERPL-CCNC: 70 U/L (ref 46–116)
ALT SERPL W P-5'-P-CCNC: 27 U/L (ref 12–78)
ANION GAP SERPL CALCULATED.3IONS-SCNC: 6 MMOL/L (ref 4–13)
APTT PPP: 29 SECONDS (ref 23–37)
AST SERPL W P-5'-P-CCNC: 11 U/L (ref 5–45)
BASOPHILS # BLD AUTO: 0.04 THOUSANDS/ΜL (ref 0–0.1)
BASOPHILS NFR BLD AUTO: 1 % (ref 0–1)
BILIRUB SERPL-MCNC: 0.44 MG/DL (ref 0.2–1)
BUN SERPL-MCNC: 11 MG/DL (ref 5–25)
CALCIUM SERPL-MCNC: 8.7 MG/DL (ref 8.3–10.1)
CHLORIDE SERPL-SCNC: 104 MMOL/L (ref 100–108)
CO2 SERPL-SCNC: 31 MMOL/L (ref 21–32)
CREAT SERPL-MCNC: 0.96 MG/DL (ref 0.6–1.3)
EOSINOPHIL # BLD AUTO: 0.29 THOUSAND/ΜL (ref 0–0.61)
EOSINOPHIL NFR BLD AUTO: 5 % (ref 0–6)
ERYTHROCYTE [DISTWIDTH] IN BLOOD BY AUTOMATED COUNT: 12.7 % (ref 11.6–15.1)
GFR SERPL CREATININE-BSD FRML MDRD: 87 ML/MIN/1.73SQ M
GLUCOSE SERPL-MCNC: 100 MG/DL (ref 65–140)
HCT VFR BLD AUTO: 43.1 % (ref 36.5–49.3)
HGB BLD-MCNC: 14.3 G/DL (ref 12–17)
IMM GRANULOCYTES # BLD AUTO: 0.03 THOUSAND/UL (ref 0–0.2)
IMM GRANULOCYTES NFR BLD AUTO: 1 % (ref 0–2)
INR PPP: 1.07 (ref 0.84–1.19)
LYMPHOCYTES # BLD AUTO: 1.56 THOUSANDS/ΜL (ref 0.6–4.47)
LYMPHOCYTES NFR BLD AUTO: 29 % (ref 14–44)
MCH RBC QN AUTO: 31 PG (ref 26.8–34.3)
MCHC RBC AUTO-ENTMCNC: 33.2 G/DL (ref 31.4–37.4)
MCV RBC AUTO: 93 FL (ref 82–98)
MONOCYTES # BLD AUTO: 0.5 THOUSAND/ΜL (ref 0.17–1.22)
MONOCYTES NFR BLD AUTO: 9 % (ref 4–12)
NEUTROPHILS # BLD AUTO: 3.01 THOUSANDS/ΜL (ref 1.85–7.62)
NEUTS SEG NFR BLD AUTO: 55 % (ref 43–75)
NRBC BLD AUTO-RTO: 0 /100 WBCS
PLATELET # BLD AUTO: 171 THOUSANDS/UL (ref 149–390)
PMV BLD AUTO: 9.9 FL (ref 8.9–12.7)
POTASSIUM SERPL-SCNC: 4 MMOL/L (ref 3.5–5.3)
PROT SERPL-MCNC: 6.8 G/DL (ref 6.4–8.2)
PROTHROMBIN TIME: 14 SECONDS (ref 11.6–14.5)
RBC # BLD AUTO: 4.62 MILLION/UL (ref 3.88–5.62)
SODIUM SERPL-SCNC: 141 MMOL/L (ref 136–145)
TROPONIN I SERPL-MCNC: <0.02 NG/ML
WBC # BLD AUTO: 5.43 THOUSAND/UL (ref 4.31–10.16)

## 2021-02-18 PROCEDURE — 93005 ELECTROCARDIOGRAM TRACING: CPT

## 2021-02-18 PROCEDURE — 80053 COMPREHEN METABOLIC PANEL: CPT | Performed by: EMERGENCY MEDICINE

## 2021-02-18 PROCEDURE — 36415 COLL VENOUS BLD VENIPUNCTURE: CPT | Performed by: EMERGENCY MEDICINE

## 2021-02-18 PROCEDURE — 72131 CT LUMBAR SPINE W/O DYE: CPT

## 2021-02-18 PROCEDURE — 99284 EMERGENCY DEPT VISIT MOD MDM: CPT

## 2021-02-18 PROCEDURE — 99284 EMERGENCY DEPT VISIT MOD MDM: CPT | Performed by: EMERGENCY MEDICINE

## 2021-02-18 PROCEDURE — 70450 CT HEAD/BRAIN W/O DYE: CPT

## 2021-02-18 PROCEDURE — 72125 CT NECK SPINE W/O DYE: CPT

## 2021-02-18 PROCEDURE — 84484 ASSAY OF TROPONIN QUANT: CPT | Performed by: EMERGENCY MEDICINE

## 2021-02-18 PROCEDURE — G1004 CDSM NDSC: HCPCS

## 2021-02-18 PROCEDURE — 85730 THROMBOPLASTIN TIME PARTIAL: CPT | Performed by: EMERGENCY MEDICINE

## 2021-02-18 PROCEDURE — 96374 THER/PROPH/DIAG INJ IV PUSH: CPT

## 2021-02-18 PROCEDURE — 85025 COMPLETE CBC W/AUTO DIFF WBC: CPT | Performed by: EMERGENCY MEDICINE

## 2021-02-18 PROCEDURE — 85610 PROTHROMBIN TIME: CPT | Performed by: EMERGENCY MEDICINE

## 2021-02-18 PROCEDURE — 96375 TX/PRO/DX INJ NEW DRUG ADDON: CPT

## 2021-02-18 RX ORDER — FENTANYL CITRATE 50 UG/ML
50 INJECTION, SOLUTION INTRAMUSCULAR; INTRAVENOUS ONCE
Status: COMPLETED | OUTPATIENT
Start: 2021-02-18 | End: 2021-02-18

## 2021-02-18 RX ORDER — OXYCODONE HYDROCHLORIDE AND ACETAMINOPHEN 5; 325 MG/1; MG/1
1 TABLET ORAL EVERY 6 HOURS PRN
Qty: 12 TABLET | Refills: 0 | Status: SHIPPED | OUTPATIENT
Start: 2021-02-18 | End: 2021-02-28

## 2021-02-18 RX ORDER — CYCLOBENZAPRINE HCL 10 MG
10 TABLET ORAL 2 TIMES DAILY PRN
Qty: 20 TABLET | Refills: 0 | Status: SHIPPED | OUTPATIENT
Start: 2021-02-18 | End: 2021-08-25 | Stop reason: ALTCHOICE

## 2021-02-18 RX ORDER — ONDANSETRON 2 MG/ML
4 INJECTION INTRAMUSCULAR; INTRAVENOUS ONCE
Status: COMPLETED | OUTPATIENT
Start: 2021-02-18 | End: 2021-02-18

## 2021-02-18 RX ADMIN — ONDANSETRON 4 MG: 2 INJECTION INTRAMUSCULAR; INTRAVENOUS at 09:45

## 2021-02-18 RX ADMIN — FENTANYL CITRATE 50 MCG: 50 INJECTION INTRAMUSCULAR; INTRAVENOUS at 09:45

## 2021-02-18 NOTE — ED NOTES
PT awake and alert, no distress noted  No other questions upon d/c       April Akhil Fry, RN  02/18/21 [id number]

## 2021-02-18 NOTE — ED PROVIDER NOTES
Emergency Department Trauma Note  Sherron Guzman III 64 y o  male MRN: [id number]  Unit/Bed#: ED 06/ED 06 Encounter: [id number]      Trauma Alert: Trauma Acuity: C level see  Model of Arrival:   via   EMS  Trauma Team: Current Providers  Attending Provider: Robby Salamanca DO  Registered Nurse: Yanira Renee RN  Consultants: None      History of Present Illness     Chief Complaint:   Chief Complaint   Patient presents with   Ileana Oneill     PT slipped and fell getting out of car at work this am  Aby Gaytan head on pavement, NO LOC, takes 81mg asa daily (did take this am) PT c/o R neck and head pain     HPI:  Lorenza Benitez is a 64 y o  male who presents with head neck and back injury status post fall  Mechanism:Details of Incident: PT slipped on ice hit back of head Injury Date: 02/18/21        64year-old gentleman comes in status post fall  Patient states he was getting out of his car and there was snow and ice it and he lost his footing fell backwards striking the back of his head neck and lower back  Patient denies having headache but is on aspirin  He does complain of midline neck pain  He also complains of right lower back pain  He denies any other injury at this time        History provided by:  Patient   used: No    Fall  Mechanism of injury: fall    Injury location:  Head/neck and torso  Head/neck injury location:  Head, L neck and R neck  Torso injury location:  Back  Incident location:  Work  Arrived directly from scene: no    Fall:     Fall occurred:  Walking (On snow and ice)    Impact surface:  [facility] of impact:  Unable to specify    Entrapped after fall: no    Protective equipment: none    Suspicion of alcohol use: no    Suspicion of drug use: no    Tetanus status:  Up to date  Prior to arrival data:     Bystander interventions:  First aid and bystander C-spine precautions    Patient ambulatory at scene: no      Blood loss:  None    Responsiveness at scene: Alert    Orientation at scene:  Person, place, situation and time    Loss of consciousness: no      Amnesic to event: no    Associated symptoms: neck pain    Associated symptoms: no abdominal pain, no chest pain, no headaches and no seizures      Review of Systems   Constitutional: Negative for activity change and appetite change  HENT: Negative for congestion and facial swelling  Eyes: Negative for discharge and redness  Respiratory: Negative for cough and wheezing  Cardiovascular: Negative for chest pain and leg swelling  Gastrointestinal: Negative for abdominal distention, abdominal pain and blood in stool  Endocrine: Negative for cold intolerance and polydipsia  Genitourinary: Negative for difficulty urinating and hematuria  Musculoskeletal: Positive for neck pain  Negative for arthralgias and gait problem  Skin: Negative for color change and rash  Allergic/Immunologic: Negative for food allergies and immunocompromised state  Neurological: Negative for dizziness, seizures and headaches  Hematological: Negative for adenopathy  Does not bruise/bleed easily  Psychiatric/Behavioral: Negative for agitation, confusion and decreased concentration  All other systems reviewed and are negative        Historical Information     Immunizations:   Immunization History   Administered Date(s) Administered    INFLUENZA 10/15/2018, 01/15/2020    Influenza Quadrivalent Preservative Free 3 years and older IM 10/15/2018    SARS-CoV-2 / COVID-19 mRNA IM (Blayne Porter) 01/12/2021, 02/10/2021    Tdap 08/25/2014       Past Medical History:   Diagnosis Date    Coronary artery disease     Depression     Dermoid cyst of face     resolved 04/19/2016    Hyperlipidemia     Myocardial infarction ([facility] [address] ) 04/15/2016    99% RCA block    Presenile dementia, depressed type ([facility] [address] )     onst 11/03/2009, resolved 07/21/2016    Psychiatric disorder     depression       Family History   Problem Relation Age of Onset    Arthritis Mother     Hypertension Mother     Hypertension Father     Heart disease Father     Coronary artery disease Father     Depression Father     Hypertension Brother     Cancer Maternal Grandfather      Past Surgical History:   Procedure Laterality Date    APPENDECTOMY      COLONOSCOPY N/A 2016    Procedure: COLONOSCOPY;  Surgeon: Hannah Ricketts MD;  Location: [facility] GI LAB; Service:     CORONARY ANGIOPLASTY WITH STENT PLACEMENT Right 04/15/2016    ESOPHAGOGASTRODUODENOSCOPY N/A 2016    Procedure: ESOPHAGOGASTRODUODENOSCOPY (EGD); Surgeon: Hannah Ricketts MD;  Location: [facility] GI LAB; Service:      Social History     Tobacco Use    Smoking status: Former Smoker     Packs/day: 0 50     Years: 15 00     Pack years: 7 50     Types: Cigarettes     Quit date: 4/15/2016     Years since quittin 8    Smokeless tobacco: Never Used   Substance Use Topics    Alcohol use: Not Currently     Frequency: 2-4 times a month     Drinks per session: 3 or 4     Comment: 2-3 beers every cpl days    Drug use: Yes     Frequency: 2 0 times per week     Types: Marijuana     Comment: daily      E-Cigarette/Vaping    E-Cigarette Use Never User      E-Cigarette/Vaping Substances       Family History: non-contributory    Meds/Allergies   Prior to Admission Medications   Prescriptions Last Dose Informant Patient Reported? Taking?    Multiple Vitamins-Minerals (PX MENS MULTIVITAMINS) TABS Not Taking at Unknown time Self Yes No   Sig: Take 1 tablet by mouth daily   aspirin 81 mg chewable tablet 2021 at Unknown time Self Yes Yes   Sig: Chew 81 mg daily   benzonatate (TESSALON) 200 MG capsule Not Taking at Unknown time Self No No   Sig: Take 1 capsule (200 mg total) by mouth 3 (three) times a day as needed for cough   Patient not taking: Reported on 2020   clindamycin (CLEOCIN) 300 MG capsule Not Taking at Unknown time  Yes No   Sig: Take 300 mg by mouth every 8 (eight) hours   co-enzyme Q-10 30 MG capsule Not Taking at Unknown time Self Yes No   Sig: Take 50 mg by mouth 3 (three) times a day   desvenlafaxine (PRISTIQ) 100 mg 24 hr tablet 2/18/2021 at Unknown time  No Yes   Sig: Take 1 tablet (100 mg total) by mouth daily   folic acid (FOLVITE) 1 mg tablet  Self Yes No   Sig: Take 1 mg by mouth daily   methocarbamol (ROBAXIN) 750 mg tablet Not Taking at Unknown time  No No   Sig: Take 1-2 tablets by mouth every 6 hours as needed for muscle pain/spasm   Patient not taking: Reported on 2/18/2021   nitroglycerin (NITROSTAT) 0 4 mg SL tablet Not Taking at Unknown time Self Yes No   Sig: Place under the tongue   predniSONE 10 mg tablet Not Taking at Unknown time  No No   Sig: TAKE 4 TAB PO DAILY X 2 DAYS, THEN 3 TAB DAILY X 2 DAYS, THEN 2 TAB DAILY X 2 DAYS, THEN 1 TAB DAILY X 2 DAYS, WITH FOOD   Patient not taking: Reported on 2/18/2021   rosuvastatin (CRESTOR) 20 MG tablet 2/18/2021 at Unknown time Self No Yes   Sig: Take 1 tablet (20 mg total) by mouth daily      Facility-Administered Medications: None       Allergies   Allergen Reactions    Amoxicillin Itching    Lipitor [Atorvastatin]        PHYSICAL EXAM    PE limited by:  Nothing    Objective   Vitals:   First set: Temperature: 97 7 °F (36 5 °C) (02/18/21 0935)  Pulse: 62 (02/18/21 0935)  Respirations: 16 (02/18/21 0935)  Blood Pressure: 151/98 (02/18/21 0935)  SpO2: 98 % (02/18/21 0935)    Primary Survey:   (A) Airway:  Intact  (B) Breathing:  Equal breath sounds bilaterally  (C) Circulation: Pulses:   normal  (D) Disabliity:  GCS Total:  15  (E) Expose:  Completed    Secondary Survey: (Click on Physical Exam tab above)  Physical Exam  Vitals signs and nursing note reviewed  Constitutional:       Appearance: He is well-developed  He is not toxic-appearing  HENT:      Head: Normocephalic and atraumatic        Right Ear: Tympanic membrane normal       Left Ear: Tympanic membrane normal       Nose: Nose normal    Eyes:      General: Lids are normal  Conjunctiva/sclera: Conjunctivae normal       Pupils: Pupils are equal, round, and reactive to light  Neck:      Musculoskeletal: Normal range of motion and neck supple  Vascular: No carotid bruit or JVD  Trachea: Trachea normal    Cardiovascular:      Rate and Rhythm: Normal rate and regular rhythm  No extrasystoles are present  Heart sounds: Normal heart sounds  Pulmonary:      Effort: Pulmonary effort is normal       Breath sounds: No decreased breath sounds, wheezing, rhonchi or rales  Chest:      Chest wall: No deformity or tenderness  Abdominal:      General: Bowel sounds are normal       Palpations: Abdomen is soft  Tenderness: There is no abdominal tenderness  There is no guarding or rebound  Musculoskeletal:      Right shoulder: He exhibits normal range of motion, no tenderness, no swelling and no deformity  Cervical back: He exhibits decreased range of motion, tenderness and pain  He exhibits no bony tenderness and no deformity  Lumbar back: He exhibits tenderness  Lymphadenopathy:      Cervical: No cervical adenopathy  Skin:     General: Skin is warm and dry  Neurological:      Mental Status: He is alert and oriented to person, place, and time  Cranial Nerves: No cranial nerve deficit  Sensory: No sensory deficit  Deep Tendon Reflexes: Reflexes are normal and symmetric  Psychiatric:         Speech: Speech normal          Behavior: Behavior normal          Thought Content: Thought content normal          Judgment: Judgment normal          Cervical spine cleared by clinical criteria?  No (imaging required)      Invasive Devices     None                 Lab Results:   Results Reviewed     Procedure Component Value Units Date/Time    Troponin I [[id number]]  (Normal) Collected: 02/18/21 0944    Lab Status: Final result Specimen: Blood from Arm, Right Updated: 02/18/21 1006     Troponin I <0 02 ng/mL     Comprehensive metabolic panel [[id number]] Collected: 02/18/21 0944    Lab Status: Final result Specimen: Blood from Arm, Right Updated: 02/18/21 1004     Sodium 141 mmol/L      Potassium 4 0 mmol/L      Chloride 104 mmol/L      CO2 31 mmol/L      ANION GAP 6 mmol/L      BUN 11 mg/dL      Creatinine 0 96 mg/dL      Glucose 100 mg/dL      Calcium 8 7 mg/dL      AST 11 U/L      ALT 27 U/L      Alkaline Phosphatase 70 U/L      Total Protein 6 8 g/dL      Albumin 3 8 g/dL      Total Bilirubin 0 44 mg/dL      eGFR 87 ml/min/1 73sq m     Narrative:      National Kidney Disease Foundation guidelines for Chronic Kidney Disease (CKD):     Stage 1 with normal or high GFR (GFR > 90 mL/min/1 73 square meters)    Stage 2 Mild CKD (GFR = 60-89 mL/min/1 73 square meters)    Stage 3A Moderate CKD (GFR = 45-59 mL/min/1 73 square meters)    Stage 3B Moderate CKD (GFR = 30-44 mL/min/1 73 square meters)    Stage 4 Severe CKD (GFR = 15-29 mL/min/1 73 square meters)    Stage 5 End Stage CKD (GFR <15 mL/min/1 73 square meters)  Note: GFR calculation is accurate only with a steady state creatinine    Protime-INR [[id number]]  (Normal) Collected: 02/18/21 0944    Lab Status: Final result Specimen: Blood from Arm, Right Updated: 02/18/21 0959     Protime 14 0 seconds      INR 1 07    APTT [[id number]]  (Normal) Collected: 02/18/21 0944    Lab Status: Final result Specimen: Blood from Arm, Right Updated: 02/18/21 0959     PTT 29 seconds     CBC and differential [[id number]] Collected: 02/18/21 0944    Lab Status: Final result Specimen: Blood from Arm, Right Updated: 02/18/21 0950     WBC 5 43 Thousand/uL      RBC 4 62 Million/uL      Hemoglobin 14 3 g/dL      Hematocrit 43 1 %      MCV 93 fL      MCH 31 0 pg      MCHC 33 2 g/dL      RDW 12 7 %      MPV 9 9 fL      Platelets 703 Thousands/uL      nRBC 0 /100 WBCs      Neutrophils Relative 55 %      Immat GRANS % 1 %      Lymphocytes Relative 29 %      Monocytes Relative 9 %      Eosinophils Relative 5 %      Basophils Relative 1 % Neutrophils Absolute 3 01 Thousands/µL      Immature Grans Absolute 0 03 Thousand/uL      Lymphocytes Absolute 1 56 Thousands/µL      Monocytes Absolute 0 50 Thousand/µL      Eosinophils Absolute 0 29 Thousand/µL      Basophils Absolute 0 04 Thousands/µL                  Imaging Studies:   Direct to CT: Yes  CT head without contrast   Final Result by Sera Hernández MD (02/18 1011)      No acute intracranial abnormality  Workstation performed: [id number]         CT cervical spine without contrast   Final Result by Sera Hernández MD (02/18 1022)      No cervical spine fracture or traumatic malalignment  Workstation performed: [id number]         CT lumbar spine without contrast   Final Result by Sera Hernández MD (02/18 1013)      No acute lumbar fracture or traumatic malalignment           Workstation performed: [id number]               Procedures  ECG 12 Lead Documentation Only    Date/Time: 2/18/2021 10:15 AM  Performed by: Can Galvez DO  Authorized by: Can Galvez DO     Patient location:  ED  Rate:     ECG rate:  56    ECG rate assessment: normal    Rhythm:     Rhythm: sinus rhythm    Ectopy:     Ectopy: none    QRS:     QRS axis:  Normal             ED Course           MDM  Number of Diagnoses or Management Options  Fall, initial encounter: new and requires workup  Strain of lumbar region, initial encounter: new and requires workup  Strain of neck muscle, initial encounter: new and requires workup     Amount and/or Complexity of Data Reviewed  Clinical lab tests: ordered and reviewed  Tests in the radiology section of CPT®: ordered and reviewed  Tests in the medicine section of CPT®: ordered and reviewed  Independent visualization of images, tracings, or specimens: yes    Patient Progress  Patient progress: stable          Disposition  [facility] Transfer: No  Final diagnoses:   Fall, initial encounter   Strain of neck muscle, initial encounter   Strain of lumbar region, initial encounter     Time reflects when diagnosis was documented in both MDM as applicable and the Disposition within this note     Time User Action Codes Description Comment    2/18/2021 10:28 AM Elmer Baker Add [H28  WOTS] Fall, initial encounter     2/18/2021 10:28 AM Mela Moeller Add [M73  1XXA] Strain of neck muscle, initial encounter     2/18/2021 10:28 AM Mela Moeller Add [S39 012A] Strain of lumbar region, initial encounter       ED Disposition     ED Disposition Condition Date/Time Comment    Discharge Stable u Feb 18, 2021 10:28 AM Danni Patches III discharge to home/self care              Follow-up Information     Follow up With Specialties Details Why Contact Info    Cleve Wolfe DO Family Medicine, Wound Care Schedule an appointment as soon as possible for a visit  As needed [address] [place] [address]  [phone]          Discharge Medication List as of 2/18/2021 10:30 AM      START taking these medications    Details   cyclobenzaprine (FLEXERIL) 10 mg tablet Take 1 tablet (10 mg total) by mouth 2 (two) times a day as needed for muscle spasms, Starting Thu 2/18/2021, Normal      oxyCODONE-acetaminophen (PERCOCET) 5-325 mg per tablet Take 1 tablet by mouth every 6 (six) hours as needed for severe pain for up to 10 daysMax Daily Amount: 4 tablets, Starting u 2/18/2021, Until Sun 2/28/2021, Normal         CONTINUE these medications which have NOT CHANGED    Details   aspirin 81 mg chewable tablet Chew 81 mg daily, Historical Med      desvenlafaxine (PRISTIQ) 100 mg 24 hr tablet Take 1 tablet (100 mg total) by mouth daily, Starting Tue 11/24/2020, Normal      rosuvastatin (CRESTOR) 20 MG tablet Take 1 tablet (20 mg total) by mouth daily, Starting Tue 5/5/2020, Normal      benzonatate (TESSALON) 200 MG capsule Take 1 capsule (200 mg total) by mouth 3 (three) times a day as needed for cough, Starting Mon 2/3/2020, Normal      clindamycin (CLEOCIN) 300 MG capsule Take 300 mg by mouth every 8 (eight) hours, Starting Tue 12/8/2020, Historical Med      co-enzyme Q-10 30 MG capsule Take 50 mg by mouth 3 (three) times a day, Historical Med      folic acid (FOLVITE) 1 mg tablet Take 1 mg by mouth daily, Historical Med      methocarbamol (ROBAXIN) 750 mg tablet Take 1-2 tablets by mouth every 6 hours as needed for muscle pain/spasm, Normal      Multiple Vitamins-Minerals (PX MENS MULTIVITAMINS) TABS Take 1 tablet by mouth daily, Historical Med      nitroglycerin (NITROSTAT) 0 4 mg SL tablet Place under the tongue, Historical Med      predniSONE 10 mg tablet TAKE 4 TAB PO DAILY X 2 DAYS, THEN 3 TAB DAILY X 2 DAYS, THEN 2 TAB DAILY X 2 DAYS, THEN 1 TAB DAILY X 2 DAYS, WITH FOOD, Normal           No discharge procedures on file      PDMP Review     None          ED Provider  Electronically Signed by         Cathryn Ray DO  02/18/21 0550

## 2021-02-19 LAB
ATRIAL RATE: 59 BPM
P AXIS: 57 DEGREES
PR INTERVAL: 190 MS
QRS AXIS: -14 DEGREES
QRSD INTERVAL: 100 MS
QT INTERVAL: 420 MS
QTC INTERVAL: 406 MS
T WAVE AXIS: 47 DEGREES
VENTRICULAR RATE: 56 BPM

## 2021-02-19 PROCEDURE — 93010 ELECTROCARDIOGRAM REPORT: CPT | Performed by: INTERNAL MEDICINE

## 2021-02-24 ENCOUNTER — OFFICE VISIT (OUTPATIENT)
Dept: PHYSICAL THERAPY | Facility: CLINIC | Age: 61
End: 2021-02-24
Payer: COMMERCIAL

## 2021-02-24 DIAGNOSIS — M54.50 ACUTE EXACERBATION OF CHRONIC LOW BACK PAIN: Primary | ICD-10-CM

## 2021-02-24 DIAGNOSIS — G89.29 ACUTE EXACERBATION OF CHRONIC LOW BACK PAIN: Primary | ICD-10-CM

## 2021-02-24 PROCEDURE — 97140 MANUAL THERAPY 1/> REGIONS: CPT | Performed by: PHYSICAL THERAPIST

## 2021-02-24 PROCEDURE — 97110 THERAPEUTIC EXERCISES: CPT | Performed by: PHYSICAL THERAPIST

## 2021-02-24 NOTE — PROGRESS NOTES
Daily Note     Today's date: 2021  Patient name: Sylvia Bañuelos III  : 1960  MRN: [id number]  Referring provider: Sarah Amezcua PT  Dx:   Encounter Diagnosis     ICD-10-CM    1  Acute exacerbation of chronic low back pain  M54 5     G89 29                   Subjective:  Patient reports less leg pain but some increase in LBP since evaluation  Discussed with patient that this is a sign of centralization and can be a good thing  Objective: See treatment diary below      Assessment: Tolerated treatment well  Patient would benefit from continued PT  We trialed traction with bilateral leg pull manually  He felt good with the traction and had no increase in his symptoms  He would benefit from attempting mechanical traction next session  Plan: Continue per plan of care  Progress treatment as tolerated  He agreed to continue with HEP of posture awareness and prone on elbows  Attempt prone press up next session       Precautions:  MI 4 years ago with stent placement        Manuals 21      Visit #  2      STM to QL  5'      Leg pull  4'      HS stretch          T_S P to A mobs  3'      Warm-up        NuStep  5'      Neuro Re-Ed        TA brace        Glute set  20 in prone                              Ther Ex        Prone on elbow  10 x 30" Press up     Clamshell  20   BkTB      Hip hinge w/ cane  20                              Ther Activity                        Gait Training                        Modalities        Traction table

## 2021-03-03 ENCOUNTER — OFFICE VISIT (OUTPATIENT)
Dept: PHYSICAL THERAPY | Facility: CLINIC | Age: 61
End: 2021-03-03
Payer: COMMERCIAL

## 2021-03-03 DIAGNOSIS — G89.29 ACUTE EXACERBATION OF CHRONIC LOW BACK PAIN: Primary | ICD-10-CM

## 2021-03-03 DIAGNOSIS — M54.50 ACUTE EXACERBATION OF CHRONIC LOW BACK PAIN: Primary | ICD-10-CM

## 2021-03-03 PROCEDURE — 97140 MANUAL THERAPY 1/> REGIONS: CPT | Performed by: PHYSICAL THERAPIST

## 2021-03-03 PROCEDURE — 97110 THERAPEUTIC EXERCISES: CPT | Performed by: PHYSICAL THERAPIST

## 2021-03-03 NOTE — PROGRESS NOTES
Daily Note     Today's date: 3/3/2021  Patient name: Graciela Hernandez  : 1960  MRN: [id number]  Referring provider: Jim Hoang PT  Dx:   Encounter Diagnosis     ICD-10-CM    1  Acute exacerbation of chronic low back pain  M54 5     G89 29                   Subjective:  Patient reports pain has been out the same  Objective: See treatment diary below      Assessment: Tolerated treatment well  Patient would benefit from continued PT  He needed cues to perform hip hinge correctly  He also admits to trying crunches on his own at home  Discussed with patient that crunches were not appropriate at this time due to the flexion of his trunk  Taught patient TA brace today  Plan: Continue per plan of care  Progress treatment as tolerated  He agreed to continue with HEP of posture awareness and prone on elbows  Attempt prone press up next session       Precautions:  MI 4 years ago with stent placement        Manuals 2/17/21 2/24/21 3/3/21     Visit #  2 3     STM to QL  5' 5'     Leg pull  4' 4'     HS stretch          T_S P to A mobs  3' 3'     Warm-up        NuStep  5' 10'     Neuro Re-Ed        TA brace   20  supine     Glute set  20 in prone 20  prone                             Ther Ex        Prone on elbow  10 x 30" Press up     Clamshell  20   BkTB 20   BkTB     Hip hinge w/ cane  20 20     TB ext   20    GTB     TB row   20    GTB             Ther Activity                        Gait Training                        Modalities        Traction table

## 2021-03-10 ENCOUNTER — OFFICE VISIT (OUTPATIENT)
Dept: PHYSICAL THERAPY | Facility: CLINIC | Age: 61
End: 2021-03-10
Payer: COMMERCIAL

## 2021-03-10 DIAGNOSIS — G89.29 ACUTE EXACERBATION OF CHRONIC LOW BACK PAIN: Primary | ICD-10-CM

## 2021-03-10 DIAGNOSIS — M54.50 ACUTE EXACERBATION OF CHRONIC LOW BACK PAIN: Primary | ICD-10-CM

## 2021-03-10 PROCEDURE — 97110 THERAPEUTIC EXERCISES: CPT | Performed by: PHYSICAL THERAPIST

## 2021-03-10 PROCEDURE — 97140 MANUAL THERAPY 1/> REGIONS: CPT | Performed by: PHYSICAL THERAPIST

## 2021-03-10 NOTE — PROGRESS NOTES
Daily Note     Today's date: 3/10/2021  Patient name: Jennifer Peña  : 1960  MRN: [id number]  Referring provider: Karlo Phipps PT  Dx:   Encounter Diagnosis     ICD-10-CM    1  Acute exacerbation of chronic low back pain  M54 5     G89 29                   Subjective:  Patient reports 5/10 pain today but notes overall feeling better  Objective: See treatment diary below      Assessment: Tolerated treatment well  Patient would benefit from continued PT  Attempted lumbar traction today with no significant response directly following  He agreed to monitor his symptoms to see if he has improvement over time  Plan: Continue per plan of care  Progress treatment as tolerated           Precautions:  MI 4 years ago with stent placement        Manuals 2/17/21 2/24/21 3/3/21 3/10/21    Visit #  2 3 4    STM to QL  5' 5' 5'    Leg pull  4' 4'     HS stretch    2'      T_S P to A mobs  3' 3' 3'    Warm-up        NuStep  5' 10' 10'    Neuro Re-Ed        TA brace   20  supine 20    Glute set  20 in prone 20  prone 20                            Ther Ex        Prone on elbow  10 x 30" Press up 20    Clamshell  20   BkTB 20   BkTB 20   BkTB    Hip hinge w/ cane  20 20     TB ext   20    GTB 20   BTB    TB row   20    GTB 20   BTB            Ther Activity                        Gait Training                        Modalities        Traction table    L-S disc w/ guarding protocol  110# max - 60# min

## 2021-03-26 ENCOUNTER — TELEPHONE (OUTPATIENT)
Dept: PSYCHIATRY | Facility: CLINIC | Age: 61
End: 2021-03-26

## 2021-04-08 ENCOUNTER — APPOINTMENT (OUTPATIENT)
Dept: RADIOLOGY | Facility: CLINIC | Age: 61
End: 2021-04-08
Payer: COMMERCIAL

## 2021-04-08 ENCOUNTER — OFFICE VISIT (OUTPATIENT)
Dept: OBGYN CLINIC | Facility: CLINIC | Age: 61
End: 2021-04-08
Payer: COMMERCIAL

## 2021-04-08 VITALS
HEIGHT: 72 IN | HEART RATE: 68 BPM | SYSTOLIC BLOOD PRESSURE: 116 MMHG | WEIGHT: 268 LBS | BODY MASS INDEX: 36.3 KG/M2 | DIASTOLIC BLOOD PRESSURE: 70 MMHG

## 2021-04-08 DIAGNOSIS — Z01.89 ENCOUNTER FOR OTHER SPECIFIED SPECIAL EXAMINATIONS: ICD-10-CM

## 2021-04-08 DIAGNOSIS — S76.311A HAMSTRING STRAIN, RIGHT, INITIAL ENCOUNTER: Primary | ICD-10-CM

## 2021-04-08 DIAGNOSIS — M25.561 RIGHT KNEE PAIN, UNSPECIFIED CHRONICITY: ICD-10-CM

## 2021-04-08 PROCEDURE — 73560 X-RAY EXAM OF KNEE 1 OR 2: CPT

## 2021-04-08 PROCEDURE — 73562 X-RAY EXAM OF KNEE 3: CPT

## 2021-04-08 PROCEDURE — 99204 OFFICE O/P NEW MOD 45 MIN: CPT | Performed by: ORTHOPAEDIC SURGERY

## 2021-04-08 NOTE — PROGRESS NOTES
CHIEF COMPLAINT:  Chief Complaint   Patient presents with    Right Knee - Pain       SUBJECTIVE:  Peggy Rosales is a 64y o  year old ***HD male who presents ***       PAST MEDICAL HISTORY:  Past Medical History:   Diagnosis Date    Coronary artery disease     Depression     Dermoid cyst of face     resolved 2016    Hyperlipidemia     Myocardial infarction ([facility] [address] ) 04/15/2016    99% RCA block    Presenile dementia, depressed type ([facility] [address] )     onst 2009, resolved 2016    Psychiatric disorder     depression       PAST SURGICAL HISTORY:  Past Surgical History:   Procedure Laterality Date    APPENDECTOMY      COLONOSCOPY N/A 2016    Procedure: COLONOSCOPY;  Surgeon: Janelle Lantigua MD;  Location: [facility] GI LAB; Service:     CORONARY ANGIOPLASTY WITH STENT PLACEMENT Right 04/15/2016    ESOPHAGOGASTRODUODENOSCOPY N/A 2016    Procedure: ESOPHAGOGASTRODUODENOSCOPY (EGD); Surgeon: Janelle Lantigua MD;  Location: [facility] GI LAB;   Service:        FAMILY HISTORY:  Family History   Problem Relation Age of Onset   [facility] Arthritis Mother     Hypertension Mother     Hypertension Father     Heart disease Father     Coronary artery disease Father     Depression Father     Hypertension Brother     Cancer Maternal Grandfather        SOCIAL HISTORY:  Social History     Tobacco Use    Smoking status: Former Smoker     Packs/day: 0 50     Years: 15 00     Pack years: 7 50     Types: Cigarettes     Quit date: 4/15/2016     Years since quittin 9    Smokeless tobacco: Never Used   Substance Use Topics    Alcohol use: Not Currently     Frequency: 2-4 times a month     Drinks per session: 3 or 4     Comment: 2-3 beers every cpl days    Drug use: Yes     Frequency: 2 0 times per week     Types: Marijuana     Comment: daily        MEDICATIONS:    Current Outpatient Medications:     aspirin 81 mg chewable tablet, Chew 81 mg daily, Disp: , Rfl:     co-enzyme Q-10 30 MG capsule, Take 50 mg by mouth 3 (three) times a day, Disp: , Rfl:     desvenlafaxine (PRISTIQ) 100 mg 24 hr tablet, Take 1 tablet (100 mg total) by mouth daily, Disp: 90 tablet, Rfl: 1    folic acid (FOLVITE) 1 mg tablet, Take 1 mg by mouth daily, Disp: , Rfl:     Multiple Vitamins-Minerals (PX MENS MULTIVITAMINS) TABS, Take 1 tablet by mouth daily, Disp: , Rfl:     rosuvastatin (CRESTOR) 20 MG tablet, Take 1 tablet (20 mg total) by mouth daily, Disp: 90 tablet, Rfl: 3    benzonatate (TESSALON) 200 MG capsule, Take 1 capsule (200 mg total) by mouth 3 (three) times a day as needed for cough (Patient not taking: Reported on 12/18/2020), Disp: 30 capsule, Rfl: 0    clindamycin (CLEOCIN) 300 MG capsule, Take 300 mg by mouth every 8 (eight) hours, Disp: , Rfl:     cyclobenzaprine (FLEXERIL) 10 mg tablet, Take 1 tablet (10 mg total) by mouth 2 (two) times a day as needed for muscle spasms (Patient not taking: Reported on 4/8/2021), Disp: 20 tablet, Rfl: 0    methocarbamol (ROBAXIN) 750 mg tablet, Take 1-2 tablets by mouth every 6 hours as needed for muscle pain/spasm (Patient not taking: Reported on 2/18/2021), Disp: 30 tablet, Rfl: 0    nitroglycerin (NITROSTAT) 0 4 mg SL tablet, Place under the tongue, Disp: , Rfl:     predniSONE 10 mg tablet, TAKE 4 TAB PO DAILY X 2 DAYS, THEN 3 TAB DAILY X 2 DAYS, THEN 2 TAB DAILY X 2 DAYS, THEN 1 TAB DAILY X 2 DAYS, WITH FOOD (Patient not taking: Reported on 2/18/2021), Disp: 20 tablet, Rfl: 0    ALLERGIES:  Allergies   Allergen Reactions    Amoxicillin Itching    Lipitor [Atorvastatin]        REVIEW OF SYSTEMS:  Review of Systems    VITALS:  Vitals:    04/08/21 0906   BP: 116/70   Pulse: 68       LABS:  HgA1c:   Lab Results   Component Value Date    HGBA1C 5 4 07/24/2018     BMP:   Lab Results   Component Value Date    CALCIUM 8 7 02/18/2021    K 4 0 02/18/2021    CO2 31 02/18/2021     02/18/2021    BUN 11 02/18/2021    CREATININE 0 96 02/18/2021 _____________________________________________________  PHYSICAL EXAMINATION:  General: {9235:19456::"VKFF developed and well nourished","alert","oriented times 3","appears comfortable"}  Psychiatric: {Psych:04418::"Normal"}  HEENT: Trachea Midline, No torticollis  Pulmonary: No audible wheezing or strider  Cardiovascular: No discernable arrhythmia   Skin: {Skin exam:87456}  Neurovascular: {Nerve Exam:82771::"Sensation Intact to the Median, Ulnar, Radial Nerve","Motor Intact to the Median, Ulnar, Radial Nerve","Pulses Intact"}    MUSCULOSKELETAL EXAMINATION:  ***    ___________________________________________________  STUDIES REVIEWED:  {nmhstudiesreviewed:23354::"Images obtained today of the *** *** views demonstrate ***"}      PROCEDURES PERFORMED:  Procedures  {Was Procdo done:53779::"No Procedures performed today"}    _____________________________________________________  ASSESSMENT/PLAN:      Diagnoses and all orders for this visit:    Right knee pain, unspecified chronicity  -     XR knee 3 vw right non injury; Future        Follow Up:  No follow-ups on file      Work/school status:  ***    To Do Next Visit:  {To do next visit:64872::"Re-evaluation of current issue"}    General Discussions:  {[place] Non-Operative Discussions:52429}    Operative Discussions:  {P[place]ito Surgical Discussions:38773}    Scribe Attestation    I,:   am acting as a scribe while in the presence of the attending physician :       I,:   personally performed the services described in this documentation    as scribed in my presence :

## 2021-04-08 NOTE — PROGRESS NOTES
Assessment/Plan:  1  Hamstring strain, right, initial encounter  XR knee 3 vw right non injury     Scribe Attestation    I,:  Mukul Plaza am acting as a scribe while in the presence of the attending physician :       I,:  Aleisha Medrano, DO personally performed the services described in this documentation    as scribed in my presence :          Sher Herring upon examination and review the x-rays of his right knee demonstrates signs and symptoms consistent with a mild distal hamstring strain, and proximal gastrocnemius  Does demonstrate full range of motion and is stable in all planes  He does demonstrate reasonable strength however is point tender into the distal portion of the medial hamstring as well as into the popliteal fossa gastrocnemius  I do believe non operative treatment is most appropriate for him in the form of a hinged knee brace to be worn over the next week  He may continue with activities of daily living as tolerated  I do not recommend surgical intervention as he does not demonstrate any significant dysfunction and denies any symptoms such as bruising, redness, or swelling that would be concerning for a significant rupture  He may utilize to Tylenol 500 mg every 8 hours for the next few days and then utilized them as needed  He may continue to go to work no restrictions  Patsypedro Nevaeh verbalized understanding and had no further questions  I will see him back on an as-needed basis  Subjective:   New patient right posterior knee pain    Patient ID: Cristobal Sullivan is a 64 y o  male presenting with activity related pain into the posterior aspect of his right knee  He states that he has been experiencing activity related pain this is posterior aspect of his knee over the past several days  He states that he was on a hike and unfortunately suffered a hyperextension twisting injury to his knee when his dog lunged and at another dog and hiker    He states that he felt a painful pulling and snapping sensation into the posterior aspect of his knee  He states that weight-bearing activities will exacerbate his pain  He describes as an intermittent and mild to moderate aching sometimes sharp pain  He states that going from a sitting to standing position as well as a standing to sitting position will exacerbate his pain  He states that the pain will quickly exacerbated however will quickly dissipate when he is at rest   He denies any significant swelling, bruising, or redness into the posterior aspect of his knee or into his leg  He denies any distal paresthesias  Review of Systems   Constitutional: Negative for chills, fever and unexpected weight change  HENT: Negative for hearing loss, nosebleeds and sore throat  Eyes: Negative for pain, redness and visual disturbance  Respiratory: Negative for cough, shortness of breath and wheezing  Cardiovascular: Negative for chest pain, palpitations and leg swelling  Gastrointestinal: Negative for abdominal pain, nausea and vomiting  Endocrine: Negative for polyphagia and polyuria  Genitourinary: Negative for dysuria and hematuria  Musculoskeletal: Positive for arthralgias  See HPI   Skin: Negative for rash and wound  Neurological: Negative for dizziness, numbness and headaches  Psychiatric/Behavioral: Negative for decreased concentration and suicidal ideas  The patient is not nervous/anxious  Past Medical History:   Diagnosis Date    Coronary artery disease     Depression     Dermoid cyst of face     resolved 04/19/2016    Hyperlipidemia     Myocardial infarction ([facility] [address] ) 04/15/2016    99% RCA block    Presenile dementia, depressed type ([facility] [address] )     onst 11/03/2009, resolved 07/21/2016    Psychiatric disorder     depression       Past Surgical History:   Procedure Laterality Date    APPENDECTOMY      COLONOSCOPY N/A 12/14/2016    Procedure: COLONOSCOPY;  Surgeon: Dorota Pham MD;  Location: [place] [address] GI LAB;   Service:    Du CORONARY ANGIOPLASTY WITH STENT PLACEMENT Right 04/15/2016    ESOPHAGOGASTRODUODENOSCOPY N/A 2016    Procedure: ESOPHAGOGASTRODUODENOSCOPY (EGD); Surgeon: Guille Puentes MD;  Location: [facility];   Service:        Family History   Problem Relation Age of Onset    Arthritis Mother     Hypertension Mother     Hypertension Father     Heart disease Father     Coronary artery disease Father     Depression Father     Hypertension Brother     Cancer Maternal Grandfather        Social History     Occupational History    Not on file   Tobacco Use    Smoking status: Former Smoker     Packs/day: 0 50     Years: 15 00     Pack years: 7 50     Types: Cigarettes     Quit date: 4/15/2016     Years since quittin 9    Smokeless tobacco: Never Used   Substance and Sexual Activity    Alcohol use: Not Currently     Frequency: 2-4 times a month     Drinks per session: 3 or 4     Comment: 2-3 beers every cpl days    Drug use: Yes     Frequency: 2 0 times per week     Types: Marijuana     Comment: daily     Sexual activity: Not on file         Current Outpatient Medications:     aspirin 81 mg chewable tablet, Chew 81 mg daily, Disp: , Rfl:     co-enzyme Q-10 30 MG capsule, Take 50 mg by mouth 3 (three) times a day, Disp: , Rfl:     desvenlafaxine (PRISTIQ) 100 mg 24 hr tablet, Take 1 tablet (100 mg total) by mouth daily, Disp: 90 tablet, Rfl: 1    folic acid (FOLVITE) 1 mg tablet, Take 1 mg by mouth daily, Disp: , Rfl:     Multiple Vitamins-Minerals (PX MENS MULTIVITAMINS) TABS, Take 1 tablet by mouth daily, Disp: , Rfl:     rosuvastatin (CRESTOR) 20 MG tablet, Take 1 tablet (20 mg total) by mouth daily, Disp: 90 tablet, Rfl: 3    benzonatate (TESSALON) 200 MG capsule, Take 1 capsule (200 mg total) by mouth 3 (three) times a day as needed for cough (Patient not taking: Reported on 2020), Disp: 30 capsule, Rfl: 0    clindamycin (CLEOCIN) 300 MG capsule, Take 300 mg by mouth every 8 (eight) hours, Disp: , Rfl:     cyclobenzaprine (FLEXERIL) 10 mg tablet, Take 1 tablet (10 mg total) by mouth 2 (two) times a day as needed for muscle spasms (Patient not taking: Reported on 4/8/2021), Disp: 20 tablet, Rfl: 0    methocarbamol (ROBAXIN) 750 mg tablet, Take 1-2 tablets by mouth every 6 hours as needed for muscle pain/spasm (Patient not taking: Reported on 2/18/2021), Disp: 30 tablet, Rfl: 0    nitroglycerin (NITROSTAT) 0 4 mg SL tablet, Place under the tongue, Disp: , Rfl:     predniSONE 10 mg tablet, TAKE 4 TAB PO DAILY X 2 DAYS, THEN 3 TAB DAILY X 2 DAYS, THEN 2 TAB DAILY X 2 DAYS, THEN 1 TAB DAILY X 2 DAYS, WITH FOOD (Patient not taking: Reported on 2/18/2021), Disp: 20 tablet, Rfl: 0    Allergies   Allergen Reactions    Amoxicillin Itching    Lipitor [Atorvastatin]        Objective:  Vitals:    04/08/21 0906   BP: 116/70   Pulse: 68       Body mass index is 36 35 kg/m²  Right Knee Exam     Tenderness   The patient is experiencing tenderness in the medial hamstring (  Popliteal fossa)  Range of Motion   Extension: 0   Flexion: 130     Tests   Ladi:  Medial - negative Lateral - negative  Varus: negative Valgus: negative  Drawer:  Anterior - negative    Posterior - negative    Other   Erythema: absent  Scars: absent  Sensation: normal  Pulse: present  Swelling: none  Effusion: no effusion present          Observations     Right Knee   Negative for effusion  Physical Exam  Vitals signs reviewed  HENT:      Head: Normocephalic and atraumatic  Eyes:      General:         Right eye: No discharge  Left eye: No discharge  Conjunctiva/sclera: Conjunctivae normal       Pupils: Pupils are equal, round, and reactive to light  Neck:      Musculoskeletal: Normal range of motion and neck supple  Cardiovascular:      Rate and Rhythm: Normal rate  Pulmonary:      Effort: Pulmonary effort is normal  No respiratory distress  Musculoskeletal:      Right knee: He exhibits no effusion  Comments: As noted in HPI   Skin:     General: Skin is warm and dry  Neurological:      Mental Status: He is alert and oriented to person, place, and time  Psychiatric:         Mood and Affect: Mood normal          Behavior: Behavior normal          I have personally reviewed pertinent films in PACS  X-rays of the right knee demonstrate no acute fracture  Mild tri compartmental osteoarthritis  No lytic or blastic lesions

## 2021-04-14 DIAGNOSIS — S76.311A HAMSTRING STRAIN, RIGHT, INITIAL ENCOUNTER: Primary | ICD-10-CM

## 2021-05-10 ENCOUNTER — TELEPHONE (OUTPATIENT)
Dept: PSYCHIATRY | Facility: CLINIC | Age: 61
End: 2021-05-10

## 2021-05-24 ENCOUNTER — TELEPHONE (OUTPATIENT)
Dept: PSYCHIATRY | Facility: CLINIC | Age: 61
End: 2021-05-24

## 2021-07-01 DIAGNOSIS — F33.0 MILD EPISODE OF RECURRENT MAJOR DEPRESSIVE DISORDER (HCC): ICD-10-CM

## 2021-07-07 RX ORDER — DESVENLAFAXINE 100 MG/1
100 TABLET, EXTENDED RELEASE ORAL DAILY
Qty: 90 TABLET | Refills: 1 | Status: SHIPPED | OUTPATIENT
Start: 2021-07-07 | End: 2021-07-09 | Stop reason: SDUPTHER

## 2021-07-09 DIAGNOSIS — F33.0 MILD EPISODE OF RECURRENT MAJOR DEPRESSIVE DISORDER (HCC): ICD-10-CM

## 2021-07-09 RX ORDER — DESVENLAFAXINE 100 MG/1
100 TABLET, EXTENDED RELEASE ORAL DAILY
Qty: 90 TABLET | Refills: 1 | Status: SHIPPED | OUTPATIENT
Start: 2021-07-09 | End: 2022-06-06 | Stop reason: SDUPTHER

## 2021-07-09 NOTE — TELEPHONE ENCOUNTER
Had an apt today but had to cancel for work  He rescheduled for next Thursday at 830 with dr Peng Perez  Can we call partial prescription in for patient?     Pristiq

## 2021-07-20 ENCOUNTER — TELEPHONE (OUTPATIENT)
Dept: FAMILY MEDICINE CLINIC | Facility: CLINIC | Age: 61
End: 2021-07-20

## 2021-08-24 ENCOUNTER — APPOINTMENT (OUTPATIENT)
Dept: LAB | Facility: CLINIC | Age: 61
End: 2021-08-24
Payer: COMMERCIAL

## 2021-08-25 ENCOUNTER — OFFICE VISIT (OUTPATIENT)
Dept: FAMILY MEDICINE CLINIC | Facility: CLINIC | Age: 61
End: 2021-08-25
Payer: COMMERCIAL

## 2021-08-25 VITALS
WEIGHT: 255 LBS | BODY MASS INDEX: 34.54 KG/M2 | DIASTOLIC BLOOD PRESSURE: 70 MMHG | HEART RATE: 72 BPM | HEIGHT: 72 IN | RESPIRATION RATE: 20 BRPM | SYSTOLIC BLOOD PRESSURE: 120 MMHG | TEMPERATURE: 97.3 F

## 2021-08-25 DIAGNOSIS — I21.11 ST ELEVATION (STEMI) MYOCARDIAL INFARCTION INVOLVING RIGHT CORONARY ARTERY (HCC): ICD-10-CM

## 2021-08-25 DIAGNOSIS — R39.12 WEAK URINARY STREAM: ICD-10-CM

## 2021-08-25 DIAGNOSIS — Z12.5 SCREENING FOR PROSTATE CANCER: ICD-10-CM

## 2021-08-25 DIAGNOSIS — E78.2 MIXED HYPERLIPIDEMIA: ICD-10-CM

## 2021-08-25 DIAGNOSIS — Z00.00 ROUTINE ADULT HEALTH MAINTENANCE: Primary | ICD-10-CM

## 2021-08-25 DIAGNOSIS — R45.86 MOOD SWINGS: ICD-10-CM

## 2021-08-25 DIAGNOSIS — Z85.828 HISTORY OF SKIN CANCER: ICD-10-CM

## 2021-08-25 DIAGNOSIS — F33.0 MILD EPISODE OF RECURRENT MAJOR DEPRESSIVE DISORDER (HCC): ICD-10-CM

## 2021-08-25 DIAGNOSIS — I25.10 ARTERIOSCLEROTIC HEART DISEASE: ICD-10-CM

## 2021-08-25 PROCEDURE — 99396 PREV VISIT EST AGE 40-64: CPT | Performed by: NURSE PRACTITIONER

## 2021-08-25 RX ORDER — ROSUVASTATIN CALCIUM 20 MG/1
20 TABLET, COATED ORAL DAILY
Qty: 30 TABLET | Refills: 0 | Status: SHIPPED | OUTPATIENT
Start: 2021-08-25 | End: 2021-09-14 | Stop reason: SDUPTHER

## 2021-08-25 NOTE — PROGRESS NOTES
FAMILY PRACTICE HEALTH MAINTENANCE OFFICE VISIT  [facility] - [facility]    NAME: Asa Sic III  AGE: 63 y o  SEX: male  : 1960     DATE: 2021    Assessment and Plan     1  Routine adult health maintenance    2  ST elevation (STEMI) myocardial infarction involving right coronary artery ([facility] )  Comments:  managed by cardiologist  Orders:  -     rosuvastatin (CRESTOR) 20 MG tablet; Take 1 tablet (20 mg total) by mouth daily    3  Arteriosclerotic heart disease  -     rosuvastatin (CRESTOR) 20 MG tablet; Take 1 tablet (20 mg total) by mouth daily  -     Comprehensive metabolic panel; Future    4  Screening for prostate cancer  -     PSA, Total Screen; Future    5  Mood swings  -     Ambulatory referral to Psychiatry; Future    6  History of skin cancer  -     Ambulatory referral to Dermatology; Future    7  Weak urinary stream  -     Ambulatory referral to Urology; Future    8  Mixed hyperlipidemia  Comments:  complaint with statin and tolerating it well    9  Mild episode of recurrent major depressive disorder ([facility] )  Comments:  will follow with psychatrist        · Patient Counseling:   · Nutrition: Stressed importance of a well balanced diet, moderation of sodium/saturated fat, caloric balance and sufficient intake of fiber  · Exercise: Stressed the importance of regular exercise with a goal of 150 minutes per week  · Dental Health: Discussed daily flossing and brushing and regular dental visits   · Sexuality: Discussed sexually transmitted infections, use of condoms and prevention of unintended pregnancy  · Alcohol Use:  Recommended moderation of alcohol intake  · Injury Prevention: Discussed Safety Belts, Safety Helmets, and Smoke Detectors    · Immunizations reviewed: See Orders  · Discussed benefits of:  Colon Cancer Screening, Prostate Cancer Screening  and Screening labs   BMI Counseling: Body mass index is 34 58 kg/m²  Discussed with patient's BMI with him   The BMI is above normal  Nutrition recommendations include reducing portion sizes, decreasing overall calorie intake, 3-5 servings of fruits/vegetables daily, reducing fast food intake, consuming healthier snacks, decreasing soda and/or juice intake, moderation in carbohydrate intake, increasing intake of lean protein, reducing intake of saturated fat and trans fat and reducing intake of cholesterol  Return in about 1 year (around 8/25/2022) for Annual physical         Chief Complaint     Chief Complaint   Patient presents with    Physical Exam     ac/lpn       History of Present Illness     HPI    Well Adult Physical   Patient here for a comprehensive physical exam   Complaint with his crestor and pristiq  Stated that noticed lots of mood swings and wants to be evaluated for bipolar by psychiatrist before considering change in drugs  Referral provided and will also schedule for counseling  Stated that at times gets very irritated and agitated  Stated that trying to loose weight and lost about 20 pounds intentionally in last 6 months  Under care of cardiologist and complaint with statin and ran out of medication and will fill up for a month  And have follow up scheduled with them on 9/14/2021  Stated that having weak urinary stream from about 2 weeks, denies any family h/o prostate cancer  Will check PSA levels and will follow with urologist as patient does not want to start any medications until consults urologist   Has h/o skin cancer and advised on routine yearly check up and referral provided for dermatologist  Refused HIV and Hepatitis C testing    Counseled on shingrix vaccine but patient refused at this time        Diet and Physical Activity  Diet: well balanced diet  Exercise: infrequently      Depression Screen  PHQ-9 Depression Screening    PHQ-9:   Frequency of the following problems over the past two weeks:      Little interest or pleasure in doing things: 1 - several days  Feeling down, depressed, or hopeless: 2 - more than half the days  Trouble falling or staying asleep, or sleeping too much: 2 - more than half the days  Feeling tired or having little energy: 3 - nearly every day  Poor appetite or overeatin - more than half the days  Feeling bad about yourself - or that you are a failure or have let yourself or your family down: 3 - nearly every day  Trouble concentrating on things, such as reading the newspaper or watching television: 0 - not at all  Moving or speaking so slowly that other people could have noticed  Or the opposite - being so fidgety or restless that you have been moving around a lot more than usual: 2 - more than half the days  Thoughts that you would be better off dead, or of hurting yourself in some way: 1 - several days  PHQ-2 Score: 3  PHQ-9 Score: 16              General Health  Hearing: Normal:  bilateral  Vision: no vision problems and most recent eye exam <1 year  Dental: regular dental visits    Reproductive Health  Monthly self testicular exams recommended      The following portions of the patient's history were reviewed and updated as appropriate: allergies, current medications, past family history, past medical history, past social history, past surgical history and problem list     Review of Systems     Review of Systems   Constitutional: Negative  HENT: Negative  Eyes: Negative  Respiratory: Negative  Cardiovascular: Negative  Gastrointestinal: Negative  Endocrine: Negative  Genitourinary: Negative for decreased urine volume, difficulty urinating, discharge, dysuria, enuresis, flank pain, frequency, genital sores, hematuria, penile pain, penile swelling, scrotal swelling, testicular pain and urgency  As noted in HPI     Musculoskeletal: Negative  Skin: Negative  Allergic/Immunologic: Negative  Neurological: Negative  Hematological: Negative  Psychiatric/Behavioral: Positive for agitation and sleep disturbance   Negative for behavioral problems, confusion, decreased concentration, dysphoric mood, hallucinations and self-injury  The patient is nervous/anxious  The patient is not hyperactive  As noted in HPI         Past Medical History     Past Medical History:   Diagnosis Date    Coronary artery disease     Depression     Dermoid cyst of face     resolved 2016    Hyperlipidemia     Myocardial infarction ([facility] ) 04/15/2016    99% RCA block    Presenile dementia, depressed type ([facility] )     onst 2009, resolved 2016    Psychiatric disorder     depression       Past Surgical History     Past Surgical History:   Procedure Laterality Date    APPENDECTOMY      COLONOSCOPY N/A 2016    Procedure: COLONOSCOPY;  Surgeon: Anila Douglas MD;  Location: [place] [address] GI LAB; Service:     CORONARY ANGIOPLASTY WITH STENT PLACEMENT Right 04/15/2016    ESOPHAGOGASTRODUODENOSCOPY N/A 2016    Procedure: ESOPHAGOGASTRODUODENOSCOPY (EGD); Surgeon: nAila Douglas MD;  Location: [facility];   Service:        Social History     Social History     Socioeconomic History    Marital status: /Civil Union     Spouse name: None    Number of children: None    Years of education: None    Highest education level: None   Occupational History    None   Tobacco Use    Smoking status: Former Smoker     Packs/day: 0 50     Years: 15 00     Pack years: 7 50     Types: Cigarettes     Quit date: 4/15/2016     Years since quittin 3    Smokeless tobacco: Never Used   Vaping Use    Vaping Use: Never used   Substance and Sexual Activity    Alcohol use: Not Currently     Comment: 2-3 beers every cpl days    Drug use: Yes     Frequency: 2 0 times per week     Types: Marijuana     Comment: daily     Sexual activity: None   Other Topics Concern    None   Social History Narrative    Lack of exercise    Pets/animals: Dog    Sleeps 8-10 hours a day             Social Determinants of Health     Financial Resource Strain:    Radha Lopez Difficulty of Paying Living Expenses:    Food Insecurity:     Worried About Running Out of Food in the Last Year:     [address] in the Last Year:    Transportation Needs:     Lack of Transportation (Medical):      Lack of Transportation (Non-Medical):    Physical Activity:     Days of Exercise per Week:     Minutes of Exercise per Session:    Stress:     Feeling of Stress :    Social Connections:     Frequency of Communication with Friends and Family:     Frequency of Social Gatherings with Friends and Family:     Attends [employer] Services:     Active Member of Clubs or Organizations:     Attends Club or Organization Meetings:     Marital Status:    Intimate Partner Violence:     Fear of Current or Ex-Partner:     Emotionally Abused:     Physically Abused:     Sexually Abused:        Family History     Family History   Problem Relation Age of Onset    Arthritis Mother     Hypertension Mother     Hypertension Father     Heart disease Father     Coronary artery disease Father     Depression Father     Hypertension Brother     Cancer Maternal Grandfather        Current Medications       Current Outpatient Medications:     aspirin 81 mg chewable tablet, Chew 81 mg daily, Disp: , Rfl:     co-enzyme Q-10 30 MG capsule, Take 50 mg by mouth 3 (three) times a day, Disp: , Rfl:     desvenlafaxine (PRISTIQ) 100 mg 24 hr tablet, Take 1 tablet (100 mg total) by mouth daily, Disp: 90 tablet, Rfl: 1    folic acid (FOLVITE) 1 mg tablet, Take 1 mg by mouth daily, Disp: , Rfl:     Multiple Vitamins-Minerals (PX MENS MULTIVITAMINS) TABS, Take 1 tablet by mouth daily, Disp: , Rfl:     nitroglycerin (NITROSTAT) 0 4 mg SL tablet, Place under the tongue, Disp: , Rfl:     rosuvastatin (CRESTOR) 20 MG tablet, Take 1 tablet (20 mg total) by mouth daily, Disp: 30 tablet, Rfl: 0    clindamycin (CLEOCIN) 300 MG capsule, Take 300 mg by mouth every 8 (eight) hours (Patient not taking: Reported on 8/25/2021), Disp: , Rfl:      Allergies     Allergies   Allergen Reactions    Amoxicillin Itching    Lipitor [Atorvastatin]        Objective     /70   Pulse 72   Temp (!) 97 3 °F (36 3 °C)   Resp 20   Ht 6' (1 829 m)   Wt 116 kg (255 lb)   BMI 34 58 kg/m²      Physical Exam  Vitals reviewed  Constitutional:       Appearance: Normal appearance  He is well-developed  HENT:      Head: Normocephalic  Right Ear: Tympanic membrane, ear canal and external ear normal       Left Ear: Tympanic membrane, ear canal and external ear normal       Nose: Nose normal       Right Sinus: No maxillary sinus tenderness or frontal sinus tenderness  Left Sinus: No maxillary sinus tenderness or frontal sinus tenderness  Mouth/Throat:      Lips: Pink  Mouth: Mucous membranes are moist       Dentition: Normal dentition  No gingival swelling  Pharynx: No pharyngeal swelling, oropharyngeal exudate or posterior oropharyngeal erythema  Eyes:      General: Lids are normal       Conjunctiva/sclera: Conjunctivae normal       Pupils: Pupils are equal, round, and reactive to light  Neck:      Thyroid: No thyromegaly  Vascular: No carotid bruit  Cardiovascular:      Rate and Rhythm: Normal rate and regular rhythm  Pulses:           Radial pulses are 2+ on the right side and 2+ on the left side  Femoral pulses are 2+ on the right side and 2+ on the left side  Dorsalis pedis pulses are 2+ on the right side and 2+ on the left side  Posterior tibial pulses are 2+ on the right side and 2+ on the left side  Heart sounds: Normal heart sounds  No murmur heard  Pulmonary:      Effort: Pulmonary effort is normal       Breath sounds: Normal breath sounds  Chest:      Breasts:         Right: No swelling, mass, nipple discharge, skin change or tenderness  Left: No swelling, mass, nipple discharge, skin change or tenderness     Abdominal:      General: Bowel sounds are normal  Palpations: Abdomen is soft  There is no hepatomegaly  Tenderness: There is no abdominal tenderness  There is no rebound  Hernia: No hernia is present  There is no hernia in the ventral area, left inguinal area or right inguinal area  Genitourinary:     Comments: MOLLY deferred as patient wants to follow with urologist  Musculoskeletal:         General: No tenderness or deformity  Normal range of motion  Cervical back: Full passive range of motion without pain, normal range of motion and neck supple  Lymphadenopathy:      Cervical:      Right cervical: No superficial or posterior cervical adenopathy  Left cervical: No superficial or posterior cervical adenopathy  Upper Body:      Right upper body: No supraclavicular or axillary adenopathy  Left upper body: No supraclavicular or axillary adenopathy  Lower Body: No right inguinal adenopathy  No left inguinal adenopathy  Skin:     General: Skin is warm and dry  Neurological:      Mental Status: He is alert and oriented to person, place, and time  GCS: GCS eye subscore is 4  GCS verbal subscore is 5  GCS motor subscore is 6  Sensory: No sensory deficit  Coordination: Coordination normal       Gait: Gait normal       Deep Tendon Reflexes: Reflexes are normal and symmetric  Psychiatric:         Attention and Perception: Attention and perception normal          Mood and Affect: Mood and affect normal          Speech: Speech normal          Behavior: Behavior normal          Thought Content:  Thought content normal          Cognition and Memory: Cognition and memory normal          Judgment: Judgment normal             Visual Acuity Screening    Right eye Left eye Both eyes   Without correction: 20/20 20/20 20/20   With correction:              Prieto Shin, [address] [facility]

## 2021-08-25 NOTE — PATIENT INSTRUCTIONS
Wellness Visit for Adults   WHAT YOU NEED TO KNOW:   What is a wellness visit? A wellness visit is when you see your healthcare provider to get screened for health problems  Your healthcare provider will also give you advice on how to stay healthy  Write down your questions so you remember to ask them  Ask your healthcare provider how often you should have a wellness visit  What happens at a wellness visit? Your healthcare provider will ask about your health, and your family history of health problems  This includes high blood pressure, heart disease, and cancer  He or she will ask if you have symptoms that concern you, if you smoke, and about your mood  You may also be asked about your intake of medicines, supplements, food, and alcohol  Any of the following may be done:  · Your weight  will be checked  Your height may also be checked so your body mass index (BMI) can be calculated  Your BMI shows if you are at a healthy weight  · Your blood pressure  and heart rate will be checked  Your temperature may also be checked  · Blood and urine tests  may be done  Blood tests may be done to check your cholesterol levels  Abnormal cholesterol levels increase your risk for heart disease and stroke  You may also need a blood or urine test to check for diabetes if you are at increased risk  Urine tests may be done to look for signs of an infection or kidney disease  · A physical exam  includes checking your heartbeat and lungs with a stethoscope  Your healthcare provider may also check your skin to look for sun damage  · Screening tests  may be recommended  A screening test is done to check for diseases that may not cause symptoms  The screening tests you may need depend on your age, gender, family history, and lifestyle habits  For example, colorectal screening may be recommended if you are 48years old or older  What screening tests do I need if I am a woman?    · A Pap smear  is used to screen for cervical cancer  Pap smears are usually done every 3 to 5 years depending on your age  You may need them more often if you have had abnormal Pap smear test results in the past  Ask your healthcare provider how often you should have a Pap smear  · A mammogram  is an x-ray of your breasts to screen for breast cancer  Experts recommend mammograms every 2 years starting at age 48 years  You may need a mammogram at age 52 years or younger if you have an increased risk for breast cancer  Talk to your healthcare provider about when you should start having mammograms and how often you need them  What vaccines might I need? · Get an influenza vaccine  every year  The influenza vaccine protects you from the flu  Several types of viruses cause the flu  The viruses change over time, so new vaccines are made each year  · Get a tetanus-diphtheria (Td) booster vaccine  every 10 years  This vaccine protects you against tetanus and diphtheria  Tetanus is a severe infection that may cause painful muscle spasms and lockjaw  Diphtheria is a severe bacterial infection that causes a thick covering in the back of your mouth and throat  · Get a human papillomavirus (HPV) vaccine  if you are female and aged 23 to 32 or male 23 to 24 and never received it  This vaccine protects you from HPV infection  HPV is the most common infection spread by sexual contact  HPV may also cause vaginal, penile, and anal cancers  · Get a pneumococcal vaccine  if you are aged 72 years or older  The pneumococcal vaccine is an injection given to protect you from pneumococcal disease  Pneumococcal disease is an infection caused by pneumococcal bacteria  The infection may cause pneumonia, meningitis, or an ear infection  · Get a shingles vaccine  if you are 60 or older, even if you have had shingles before  The shingles vaccine is an injection to protect you from the varicella-zoster virus  This is the same virus that causes chickenpox   Shingles is a painful rash that develops in people who had chickenpox or have been exposed to the virus  How can I eat healthy? My Plate is a model for planning healthy meals  It shows the types and amounts of foods that should go on your plate  Fruits and vegetables make up about half of your plate, and grains and protein make up the other half  A serving of dairy is included on the side of your plate  The amount of calories and serving sizes you need depends on your age, gender, weight, and height  Examples of healthy foods are listed below:  · Eat a variety of vegetables  such as dark green, red, and orange vegetables  You can also include canned vegetables low in sodium (salt) and frozen vegetables without added butter or sauces  · Eat a variety of fresh fruits , canned fruit in 100% juice, frozen fruit, and dried fruit  · Include whole grains  At least half of the grains you eat should be whole grains  Examples include whole-wheat bread, wheat pasta, brown rice, and whole-grain cereals such as oatmeal     · Eat a variety of protein foods such as seafood (fish and shellfish), lean meat, and poultry without skin (turkey and chicken)  Examples of lean meats include pork leg, shoulder, or tenderloin, and beef round, sirloin, tenderloin, and extra lean ground beef  Other protein foods include eggs and egg substitutes, beans, peas, soy products, nuts, and seeds  · Choose low-fat dairy products such as skim or 1% milk or low-fat yogurt, cheese, and cottage cheese  · Limit unhealthy fats  such as butter, hard margarine, and shortening  How much exercise do I need? Exercise at least 30 minutes per day on most days of the week  Some examples of exercise include walking, biking, dancing, and swimming  You can also fit in more physical activity by taking the stairs instead of the elevator or parking farther away from stores  Include muscle strengthening activities 2 days each week   Regular exercise provides many health benefits  It helps you manage your weight, and decreases your risk for type 2 diabetes, heart disease, stroke, and high blood pressure  Exercise can also help improve your mood  Ask your healthcare provider about the best exercise plan for you  What are some general health and safety guidelines I should follow? · Do not smoke  Nicotine and other chemicals in cigarettes and cigars can cause lung damage  Ask your healthcare provider for information if you currently smoke and need help to quit  E-cigarettes or smokeless tobacco still contain nicotine  Talk to your healthcare provider before you use these products  · Limit alcohol  A drink of alcohol is 12 ounces of beer, 5 ounces of wine, or 1½ ounces of liquor  · Lose weight, if needed  Being overweight increases your risk of certain health conditions  These include heart disease, high blood pressure, type 2 diabetes, and certain types of cancer  · Protect your skin  Do not sunbathe or use tanning beds  Use sunscreen with a SPF 15 or higher  Apply sunscreen at least 15 minutes before you go outside  Reapply sunscreen every 2 hours  Wear protective clothing, hats, and sunglasses when you are outside  · Drive safely  Always wear your seatbelt  Make sure everyone in your car wears a seatbelt  A seatbelt can save your life if you are in an accident  Do not use your cell phone when you are driving  This could distract you and cause an accident  Pull over if you need to make a call or send a text message  · Practice safe sex  Use latex condoms if are sexually active and have more than one partner  Your healthcare provider may recommend screening tests for sexually transmitted infections (STIs)  · Wear helmets, lifejackets, and protective gear  Always wear a helmet when you ride a bike or motorcycle, go skiing, or play sports that could cause a head injury  Wear protective equipment when you play sports   Wear a lifejacket when you are on a boat or doing water sports  CARE AGREEMENT:   You have the right to help plan your care  Learn about your health condition and how it may be treated  Discuss treatment options with your healthcare providers to decide what care you want to receive  You always have the right to refuse treatment  The above information is an  only  It is not intended as medical advice for individual conditions or treatments  Talk to your doctor, nurse or pharmacist before following any medical regimen to see if it is safe and effective for you  © Copyright [employer] 2021 Information is for End User's use only and may not be sold, redistributed or otherwise used for commercial purposes   All illustrations and images included in CareNotes® are the copyrighted property of [employer]  or [address] [place] [address]

## 2021-09-14 ENCOUNTER — OFFICE VISIT (OUTPATIENT)
Dept: CARDIOLOGY CLINIC | Facility: CLINIC | Age: 61
End: 2021-09-14
Payer: COMMERCIAL

## 2021-09-14 VITALS
WEIGHT: 249 LBS | HEIGHT: 72 IN | HEART RATE: 67 BPM | TEMPERATURE: 98.7 F | DIASTOLIC BLOOD PRESSURE: 80 MMHG | BODY MASS INDEX: 33.72 KG/M2 | SYSTOLIC BLOOD PRESSURE: 110 MMHG | OXYGEN SATURATION: 100 %

## 2021-09-14 DIAGNOSIS — I21.11 ST ELEVATION (STEMI) MYOCARDIAL INFARCTION INVOLVING RIGHT CORONARY ARTERY (HCC): ICD-10-CM

## 2021-09-14 DIAGNOSIS — I25.10 ARTERIOSCLEROTIC HEART DISEASE: ICD-10-CM

## 2021-09-14 DIAGNOSIS — N52.03 COMBINED ARTERIAL INSUFFICIENCY AND CORPORO-VENOUS OCCLUSIVE ERECTILE DYSFUNCTION: ICD-10-CM

## 2021-09-14 DIAGNOSIS — E66.9 OBESITY (BMI 30.0-34.9): ICD-10-CM

## 2021-09-14 DIAGNOSIS — E78.2 MIXED HYPERLIPIDEMIA: ICD-10-CM

## 2021-09-14 DIAGNOSIS — I25.10 CAD IN NATIVE ARTERY: Primary | ICD-10-CM

## 2021-09-14 PROCEDURE — 99214 OFFICE O/P EST MOD 30 MIN: CPT | Performed by: INTERNAL MEDICINE

## 2021-09-14 PROCEDURE — 93000 ELECTROCARDIOGRAM COMPLETE: CPT | Performed by: INTERNAL MEDICINE

## 2021-09-14 RX ORDER — ROSUVASTATIN CALCIUM 20 MG/1
20 TABLET, COATED ORAL DAILY
Qty: 90 TABLET | Refills: 3 | Status: SHIPPED | OUTPATIENT
Start: 2021-09-14 | End: 2021-09-14 | Stop reason: SDUPTHER

## 2021-09-14 RX ORDER — ROSUVASTATIN CALCIUM 20 MG/1
20 TABLET, COATED ORAL DAILY
Qty: 90 TABLET | Refills: 3 | Status: SHIPPED | OUTPATIENT
Start: 2021-09-14 | End: 2021-09-20

## 2021-09-14 NOTE — PROGRESS NOTES
Cardiology   Patricia Dunn DO, Kyra Neal MD, Veronica Sims MD, Flip lFores MD, [facility] [place]  -------------------------------------------------------------------  [facility]OP[facility]IC [facility]  [address], [address]  [place], [address] [place] [address], [address] [place] [address]  [phone]    Cardiology Follow Up  Charity Cabezas III  1960  [id number]          Assessment/Plan:    1  CAD in native artery    2  Combined arterial insufficiency and corporo-venous occlusive erectile dysfunction    3  Mixed hyperlipidemia    4  Obesity (BMI 30 0-34 9)    5  ST elevation (STEMI) myocardial infarction involving right coronary artery ([facility] [address] )    6  Arteriosclerotic heart disease      - He is feeling well without any symptoms - continue current Rx   - Continue ASA  - Continue Crestor - LDL was 31 mg/dL in August      - BP goal is < 130/80 - at goal today  - Discussed diet and exercise in detail    Interval History:     Amy Guerrero is 63 y o  male here for followup of CAD  Since his last visit, he has been feeling well   he denies any palpitations, chest pain, shortness of breath, LE edema, orthopnea or PND  He has lost over 20 pounds with diet modification  He had blood work done which showed an LDL of 31 mg/dL which is improved from 52 previously  TG were 73  The patient has a history of CAD  In April of 2016, he underwent PCI to a 99% mRCA lesion after a STEMI  He was driving and felt tightness in the center part of his chest associated with diaphoresis         Past Medical History:   Diagnosis Date    Coronary artery disease     Depression     Dermoid cyst of face     resolved 04/19/2016    Hyperlipidemia     Myocardial infarction ([facility] [address] ) 04/15/2016    99% RCA block    Presenile dementia, depressed type ([facility] [address] )     onst 11/03/2009, resolved 07/21/2016    Psychiatric disorder     depression     Social History     Socioeconomic History    Marital status: /Civil [employer]     Spouse name: Not on file    Number of children: Not on file    Years of education: Not on file    Highest education level: Not on file   Occupational History    Not on file   Tobacco Use    Smoking status: Former Smoker     Packs/day: 0 50     Years: 15 00     Pack years: 7 50     Types: Cigarettes     Quit date: 4/15/2016     Years since quittin 4    Smokeless tobacco: Never Used   Vaping Use    Vaping Use: Never used   Substance and Sexual Activity    Alcohol use: Not Currently     Comment: 2-3 beers every cpl days    Drug use: Yes     Frequency: 2 0 times per week     Types: Marijuana     Comment: daily     Sexual activity: Not on file   Other Topics Concern    Not on file   Social History Narrative    Lack of exercise    Pets/animals: Dog    Sleeps 8-10 hours a day             Social Determinants of Health     Financial Resource Strain:     Difficulty of Paying Living Expenses:    Food Insecurity:     Worried About Running Out of Food in the Last Year:     Ran Out of Food in the Last Year:    Transportation Needs:     Lack of Transportation (Medical):      Lack of Transportation (Non-Medical):    Physical Activity:     Days of Exercise per Week:     Minutes of Exercise per Session:    Stress:     Feeling of Stress :    Social Connections:     Frequency of Communication with Friends and Family:     Frequency of Social Gatherings with Friends and Family:     Attends [employer]ism Services:     Active Member of Clubs or Organizations:     Attends Club or Organization Meetings:     Marital Status:    Intimate Partner Violence:     Fear of Current or Ex-Partner:     Emotionally Abused:     Physically Abused:     Sexually Abused:       Family History   Problem Relation Age of Onset    Arthritis Mother     Hypertension Mother     Hypertension Father     Heart disease Father     Coronary artery disease Father     Depression Father     Hypertension Brother    Hillary Bains Cancer Maternal Grandfather      Past Surgical History:   Procedure Laterality Date    APPENDECTOMY      COLONOSCOPY N/A 12/14/2016    Procedure: COLONOSCOPY;  Surgeon: Greer Adan MD;  Location: [facility] GI [facility]; Service:     CORONARY ANGIOPLASTY WITH STENT PLACEMENT Right 04/15/2016    ESOPHAGOGASTRODUODENOSCOPY N/A 12/14/2016    Procedure: ESOPHAGOGASTRODUODENOSCOPY (EGD); Surgeon: Greer Adan MD;  Location: [facility]; Service:        Current Outpatient Medications:     aspirin 81 mg chewable tablet, Chew 81 mg daily, Disp: , Rfl:     co-enzyme Q-10 30 MG capsule, Take 50 mg by mouth 3 (three) times a day, Disp: , Rfl:     desvenlafaxine (PRISTIQ) 100 mg 24 hr tablet, Take 1 tablet (100 mg total) by mouth daily, Disp: 90 tablet, Rfl: 1    folic acid (FOLVITE) 1 mg tablet, Take 1 mg by mouth daily, Disp: , Rfl:     Multiple Vitamins-Minerals (PX MENS MULTIVITAMINS) TABS, Take 1 tablet by mouth daily, Disp: , Rfl:     nitroglycerin (NITROSTAT) 0 4 mg SL tablet, Place under the tongue, Disp: , Rfl:     rosuvastatin (CRESTOR) 20 MG tablet, Take 1 tablet (20 mg total) by mouth daily, Disp: 90 tablet, Rfl: 3    clindamycin (CLEOCIN) 300 MG capsule, Take 300 mg by mouth every 8 (eight) hours (Patient not taking: Reported on 8/25/2021), Disp: , Rfl:         Review of Systems:  Review of Systems   Respiratory: Negative for shortness of breath  Cardiovascular: Negative for chest pain, palpitations and leg swelling  Gastrointestinal: Positive for abdominal pain  All other systems reviewed and are negative  Physical Exam:  Vitals:  Vitals:    09/14/21 0839   BP: 110/80   BP Location: Left arm   Patient Position: Sitting   Cuff Size: Large   Pulse: 67   Temp: 98 7 °F (37 1 °C)   TempSrc: Temporal   SpO2: 100%   Weight: 113 kg (249 lb)   Height: 6' (1 829 m)     Physical Exam   Constitutional: He appears healthy  No distress  Eyes: Pupils are equal, round, and reactive to light  Conjunctivae are normal    Neck: No JVD present  Cardiovascular: Normal rate, regular rhythm and normal heart sounds  Exam reveals no gallop and no friction rub  No murmur heard  Pulmonary/Chest: Effort normal and breath sounds normal  He has no wheezes  He has no rales  Musculoskeletal:         General: No tenderness, deformity or edema  Cervical back: Normal range of motion and neck supple  Neurological: He is alert and oriented to person, place, and time  Skin: Skin is warm and dry  Cardiographics:  EKG: Personally reviewed NSR at 65 bpm  Last known EF: 60% (2017)    This note was completed in part utilizing [employer]  Grammatical errors, random word insertions, spelling mistakes, and incomplete sentences can be an occasional consequence of this system secondary to software limitations, ambient noise, and hardware issues  If you have any questions or concerns about the content, text, or information contained within the body of this dictation, please contact the provider for clarification

## 2021-09-20 DIAGNOSIS — I25.10 ARTERIOSCLEROTIC HEART DISEASE: ICD-10-CM

## 2021-09-20 DIAGNOSIS — I21.11 ST ELEVATION (STEMI) MYOCARDIAL INFARCTION INVOLVING RIGHT CORONARY ARTERY (HCC): ICD-10-CM

## 2021-09-20 RX ORDER — ROSUVASTATIN CALCIUM 20 MG/1
TABLET, COATED ORAL
Qty: 90 TABLET | Refills: 1 | Status: SHIPPED | OUTPATIENT
Start: 2021-09-20

## 2021-10-10 PROBLEM — I83.892 SYMPTOMATIC VARICOSE VEINS OF LEFT LOWER EXTREMITY: Status: ACTIVE | Noted: 2021-10-10

## 2021-10-11 ENCOUNTER — OFFICE VISIT (OUTPATIENT)
Dept: VASCULAR SURGERY | Facility: CLINIC | Age: 61
End: 2021-10-11
Payer: COMMERCIAL

## 2021-10-11 ENCOUNTER — TELEPHONE (OUTPATIENT)
Dept: ADMINISTRATIVE | Facility: HOSPITAL | Age: 61
End: 2021-10-11

## 2021-10-11 VITALS
BODY MASS INDEX: 34.27 KG/M2 | SYSTOLIC BLOOD PRESSURE: 116 MMHG | WEIGHT: 253 LBS | DIASTOLIC BLOOD PRESSURE: 72 MMHG | HEIGHT: 72 IN | RESPIRATION RATE: 18 BRPM | HEART RATE: 68 BPM

## 2021-10-11 DIAGNOSIS — E78.2 MIXED HYPERLIPIDEMIA: ICD-10-CM

## 2021-10-11 DIAGNOSIS — N52.03 COMBINED ARTERIAL INSUFFICIENCY AND CORPORO-VENOUS OCCLUSIVE ERECTILE DYSFUNCTION: ICD-10-CM

## 2021-10-11 DIAGNOSIS — R22.42 MASS OF LEFT THIGH: ICD-10-CM

## 2021-10-11 DIAGNOSIS — I83.892 SYMPTOMATIC VARICOSE VEINS OF LEFT LOWER EXTREMITY: Primary | ICD-10-CM

## 2021-10-11 PROCEDURE — 99244 OFF/OP CNSLTJ NEW/EST MOD 40: CPT | Performed by: PHYSICIAN ASSISTANT

## 2021-10-11 NOTE — TELEPHONE ENCOUNTER
Patient was given an ambulatory referral to general surgery  Would like to go home and discuss with his wife and make an appointment from home

## 2021-10-15 ENCOUNTER — HOSPITAL ENCOUNTER (OUTPATIENT)
Dept: RADIOLOGY | Facility: HOSPITAL | Age: 61
Discharge: HOME | End: 2021-10-15
Payer: COMMERCIAL

## 2021-10-15 ENCOUNTER — TELEPHONE (OUTPATIENT)
Dept: CARDIOLOGY CLINIC | Facility: CLINIC | Age: 61
End: 2021-10-15

## 2021-10-15 DIAGNOSIS — R22.42 MASS OF LEFT THIGH: ICD-10-CM

## 2021-10-15 LAB
ALBUMIN SERPL BCP-MCNC: 3.8 G/DL (ref 3.5–5)
ALP SERPL-CCNC: 78 U/L (ref 46–116)
ALT SERPL W P-5'-P-CCNC: 42 U/L (ref 12–78)
ANION GAP SERPL CALCULATED.3IONS-SCNC: 6 MMOL/L (ref 4–13)
AST SERPL W P-5'-P-CCNC: 16 U/L (ref 5–45)
BILIRUB SERPL-MCNC: 0.61 MG/DL (ref 0.2–1)
BUN SERPL-MCNC: 10 MG/DL (ref 5–25)
CALCIUM SERPL-MCNC: 8.7 MG/DL (ref 8.3–10.1)
CHLORIDE SERPL-SCNC: 107 MMOL/L (ref 100–108)
CHOLEST SERPL-MCNC: 109 MG/DL (ref 50–200)
CO2 SERPL-SCNC: 30 MMOL/L (ref 21–32)
CREAT SERPL-MCNC: 0.88 MG/DL (ref 0.6–1.3)
GFR SERPL CREATININE-BSD FRML MDRD: 94 ML/MIN/1.73SQ M
GLUCOSE P FAST SERPL-MCNC: 98 MG/DL (ref 65–99)
HDLC SERPL-MCNC: 49 MG/DL
LDLC SERPL CALC-MCNC: 48 MG/DL (ref 0–100)
NONHDLC SERPL-MCNC: 60 MG/DL
POTASSIUM SERPL-SCNC: 4.2 MMOL/L (ref 3.5–5.3)
PROT SERPL-MCNC: 6.4 G/DL (ref 6.4–8.2)
SODIUM SERPL-SCNC: 143 MMOL/L (ref 136–145)
TRIGL SERPL-MCNC: 61 MG/DL

## 2021-10-15 PROCEDURE — 80053 COMPREHEN METABOLIC PANEL: CPT | Performed by: INTERNAL MEDICINE

## 2021-10-15 PROCEDURE — 80061 LIPID PANEL: CPT | Performed by: INTERNAL MEDICINE

## 2021-10-15 PROCEDURE — 73701 CT LOWER EXTREMITY W/DYE: CPT

## 2021-10-15 PROCEDURE — 36415 COLL VENOUS BLD VENIPUNCTURE: CPT | Performed by: INTERNAL MEDICINE

## 2021-10-15 RX ADMIN — IOHEXOL 100 ML: 350 INJECTION, SOLUTION INTRAVENOUS at 10:13

## 2021-10-18 ENCOUNTER — TELEPHONE (OUTPATIENT)
Dept: VASCULAR SURGERY | Facility: CLINIC | Age: 61
End: 2021-10-18

## 2021-10-18 ENCOUNTER — ANTICOAG VISIT (OUTPATIENT)
Dept: VASCULAR SURGERY | Facility: CLINIC | Age: 61
End: 2021-10-18

## 2021-12-06 ENCOUNTER — OFFICE VISIT (OUTPATIENT)
Dept: UROLOGY | Facility: CLINIC | Age: 61
End: 2021-12-06
Payer: COMMERCIAL

## 2021-12-06 VITALS
SYSTOLIC BLOOD PRESSURE: 120 MMHG | DIASTOLIC BLOOD PRESSURE: 82 MMHG | BODY MASS INDEX: 33.59 KG/M2 | WEIGHT: 248 LBS | HEIGHT: 72 IN

## 2021-12-06 DIAGNOSIS — R39.12 WEAK URINARY STREAM: ICD-10-CM

## 2021-12-06 LAB — POST-VOID RESIDUAL VOLUME, ML POC: 29 ML

## 2021-12-06 PROCEDURE — 51798 US URINE CAPACITY MEASURE: CPT | Performed by: PHYSICIAN ASSISTANT

## 2021-12-06 PROCEDURE — 99243 OFF/OP CNSLTJ NEW/EST LOW 30: CPT | Performed by: PHYSICIAN ASSISTANT

## 2021-12-06 RX ORDER — TAMSULOSIN HYDROCHLORIDE 0.4 MG/1
0.4 CAPSULE ORAL
Qty: 30 CAPSULE | Refills: 2 | Status: SHIPPED | OUTPATIENT
Start: 2021-12-06 | End: 2022-03-06

## 2021-12-17 ENCOUNTER — IMMUNIZATIONS (OUTPATIENT)
Dept: FAMILY MEDICINE CLINIC | Facility: HOSPITAL | Age: 61
End: 2021-12-17

## 2021-12-17 DIAGNOSIS — Z23 ENCOUNTER FOR IMMUNIZATION: Primary | ICD-10-CM

## 2021-12-17 PROCEDURE — 0064A COVID-19 MODERNA VACC 0.25 ML BOOSTER: CPT

## 2021-12-17 PROCEDURE — 91306 COVID-19 MODERNA VACC 0.25 ML BOOSTER: CPT

## 2022-05-16 ENCOUNTER — TELEPHONE (OUTPATIENT)
Dept: VASCULAR SURGERY | Facility: CLINIC | Age: 62
End: 2022-05-16

## 2022-05-16 NOTE — TELEPHONE ENCOUNTER
Attempted to contact patient to schedule appointment(s) listed below  Requested patient call [phone] option 3 to schedule appointment(s)  Patient's appointment(s) are past due, expected ASAP      Dopplers  [] Abdominal Aorta Iliac (AOIL)  [] Carotid (CV)   [] Celiac and/or Mesenteric  [] Endovascular Aortic Repair (EVAR)   [] Hemodialysis Access (HD)   [] Lower Limb Arterial (ROBBI)  [] Lower Limb Venous Duplex with Reflux (LEVDR)  [] Renal Artery  [] Upper Limb (UEA)    Advanced Imaging   [] CTA abdomen    [] CTA abdomen & pelvis    [] CT abdomen with/ without contrast  [] CT abdomen with contrast  [] CT abdomen without contrast    [] CT abdomen & pelvis with/ without contrast  [] CT abdomen & pelvis with contrast  [] CT abdomen & pelvis without contrast    Office Visit   [] New patient, patient last seen over 3 years ago  [] Risk factor modification (RFM)   [x] Follow up

## 2022-05-18 NOTE — TELEPHONE ENCOUNTER
Overdue 3 month follow up, last seen 10/11/21 by BV    Patient due for follow up after compression usage, no testing

## 2022-06-06 DIAGNOSIS — F33.0 MILD EPISODE OF RECURRENT MAJOR DEPRESSIVE DISORDER (HCC): ICD-10-CM

## 2022-06-06 RX ORDER — DESVENLAFAXINE 100 MG/1
100 TABLET, EXTENDED RELEASE ORAL DAILY
Qty: 90 TABLET | Refills: 1 | Status: SHIPPED | OUTPATIENT
Start: 2022-06-06

## 2022-06-24 ENCOUNTER — TELEPHONE (OUTPATIENT)
Dept: PHYSICAL THERAPY | Facility: OTHER | Age: 62
End: 2022-06-24

## 2022-06-24 NOTE — TELEPHONE ENCOUNTER
Patient filled out On-Line form for [facility]  Nurse reached out to discuss the program with him  Message left to CB and leave full name,  on VM and one of the nurses will return the call  Will await CB from patient

## 2022-06-27 NOTE — TELEPHONE ENCOUNTER
Patient called in and LM on [facility] VM, Friday 6/24/22 at 6:06PM      He had filled out an online survey  Voice message left for patient to call back  Phone number and hours of business provided  Patient was triaged by [facility] 1/28/2021 and started PT  Last PT was 3/10/2021  Possible new issues?

## 2022-08-02 ENCOUNTER — TELEPHONE (OUTPATIENT)
Dept: VASCULAR SURGERY | Facility: CLINIC | Age: 62
End: 2022-08-02

## 2022-08-02 NOTE — TELEPHONE ENCOUNTER
Attempted to contact patient to schedule appointment(s) listed below  Requested patient call [phone] option 3 to schedule appointment(s)  Patient's appointment(s) are scheduled for 08/02/22 OV NEEDS TO BE RESCHEDULED  Dopplers  [] Abdominal Aorta Iliac (AOIL)  [] Carotid (CV)   [] Celiac and/or Mesenteric  [] Endovascular Aortic Repair (EVAR)   [] Hemodialysis Access (HD)   [] Lower Limb Arterial (ROBBI)  [] Lower Limb Venous Duplex with Reflux (LEVDR)  [] Renal Artery  [] Upper Limb Arterial (UEA)    [] Upper Limb Venous (UEV)    Advanced Imaging   [] CTA abdomen    [] CTA abdomen & pelvis    [] CT abdomen with/ without contrast  [] CT abdomen with contrast  [] CT abdomen without contrast    [] CT abdomen & pelvis with/ without contrast  [] CT abdomen & pelvis with contrast  [] CT abdomen & pelvis without contrast    Office Visit   [] New patient, patient last seen over 3 years ago  [x] Risk factor modification (RFM) patient did not show for appointment  Patient cell phone was called and a message was left to call back [phone] opt 3 to have appointment rescheduled     [] Follow up

## 2022-11-16 DIAGNOSIS — F33.0 MILD EPISODE OF RECURRENT MAJOR DEPRESSIVE DISORDER (HCC): ICD-10-CM

## 2022-11-16 RX ORDER — DESVENLAFAXINE 100 MG/1
100 TABLET, EXTENDED RELEASE ORAL DAILY
Qty: 90 TABLET | Refills: 1 | Status: SHIPPED | OUTPATIENT
Start: 2022-11-16

## 2022-11-25 DIAGNOSIS — I21.11 ST ELEVATION (STEMI) MYOCARDIAL INFARCTION INVOLVING RIGHT CORONARY ARTERY (HCC): ICD-10-CM

## 2022-11-25 DIAGNOSIS — I25.10 ARTERIOSCLEROTIC HEART DISEASE: ICD-10-CM

## 2022-11-25 RX ORDER — ROSUVASTATIN CALCIUM 20 MG/1
20 TABLET, COATED ORAL DAILY
Qty: 90 TABLET | Refills: 0 | Status: SHIPPED | OUTPATIENT
Start: 2022-11-25

## 2022-11-25 NOTE — TELEPHONE ENCOUNTER
Patients wife called for medication refills  Patient has not been seen since 2019  And was transferred to  to schedule

## 2022-12-14 ENCOUNTER — OFFICE VISIT (OUTPATIENT)
Dept: CARDIOLOGY CLINIC | Facility: CLINIC | Age: 62
End: 2022-12-14

## 2022-12-14 VITALS
HEIGHT: 72 IN | BODY MASS INDEX: 33.59 KG/M2 | TEMPERATURE: 97.8 F | DIASTOLIC BLOOD PRESSURE: 72 MMHG | SYSTOLIC BLOOD PRESSURE: 124 MMHG | HEART RATE: 60 BPM | OXYGEN SATURATION: 98 % | WEIGHT: 248 LBS

## 2022-12-14 DIAGNOSIS — N52.03 COMBINED ARTERIAL INSUFFICIENCY AND CORPORO-VENOUS OCCLUSIVE ERECTILE DYSFUNCTION: ICD-10-CM

## 2022-12-14 DIAGNOSIS — I25.10 ARTERIOSCLEROTIC HEART DISEASE: Primary | ICD-10-CM

## 2022-12-14 DIAGNOSIS — E78.2 MIXED HYPERLIPIDEMIA: ICD-10-CM

## 2022-12-14 DIAGNOSIS — I25.2 HISTORY OF ST ELEVATION MYOCARDIAL INFARCTION (STEMI): ICD-10-CM

## 2022-12-14 DIAGNOSIS — I21.11 ST ELEVATION (STEMI) MYOCARDIAL INFARCTION INVOLVING RIGHT CORONARY ARTERY (HCC): ICD-10-CM

## 2022-12-14 NOTE — PROGRESS NOTES
Cardiology   Sourav Davies DO, Melida Allen MD, Jenna Smith MD, Farzana Leiva MD, [facility] [place]  -------------------------------------------------------------------  [facility] ORTHOPEDIC [facility]  [address], [address] [place] [address], [facility]  [phone]    Cardiology Follow Up  Christine Eller III  1960  [id number]          Assessment/Plan:    1  Arteriosclerotic heart disease    2  ST elevation (STEMI) myocardial infarction involving right coronary artery ([facility] [address] )    3  Combined arterial insufficiency and corporo-venous occlusive erectile dysfunction    4  Mixed hyperlipidemia    5  History of ST elevation myocardial infarction (STEMI)      - He is feeling well without any symptoms - continue current Rx   - Continue ASA  - Continue Crestor - repeat lipid panel and CMP ordered  - BP goal is < 130/80 - at goal today  - Discussed diet and exercise in detail    Interval History:     Christine Eller III is 63 y o  male here for followup of CAD  Since his last visit, he denies any chest pain, shortness of breath, lower extremity edema, orthopnea or paroxysmal nocturnal dyspnea  No recent blood work  His last LDL was 31 mg/dL   Currently he is using rosuvastatin 20 mg daily and aspirin 81 mg daily  The patient has a history of CAD  In April of 2016, he underwent PCI to a 99% mRCA lesion after a STEMI  He was driving and felt tightness in the center part of his chest associated with diaphoresis         Past Medical History:   Diagnosis Date   • Coronary artery disease    • Depression    • Dermoid cyst of face     resolved 04/19/2016   • Hyperlipidemia    • Myocardial infarction ([facility] [address] ) 04/15/2016    99% RCA block   • Presenile dementia, depressed type     onst 11/03/2009, resolved 07/21/2016   • Psychiatric disorder     depression     Social History     Socioeconomic History   • Marital status: /Civil Union     Spouse name: Not on file How Severe Is It?: moderate Is This A New Presentation, Or A Follow-Up?: Follow Up Isotretinoin • Number of children: Not on file   • Years of education: Not on file   • Highest education level: Not on file   Occupational History   • Not on file   Tobacco Use   • Smoking status: Former     Packs/day: 0 50     Years: 15 00     Pack years: 7 50     Types: Cigarettes     Quit date: 4/15/2016     Years since quittin 6   • Smokeless tobacco: Never   Vaping Use   • Vaping Use: Never used   Substance and Sexual Activity   • Alcohol use: Not Currently     Comment: 2-3 beers every cpl days   • Drug use: Yes     Frequency: 2 0 times per week     Types: Marijuana     Comment: daily    • Sexual activity: Not on file   Other Topics Concern   • Not on file   Social History Narrative    Lack of exercise    Pets/animals: Dog    Sleeps 8-10 hours a day             Social Determinants of Health     Financial Resource Strain: Not on file   Food Insecurity: Not on file   Transportation Needs: Not on file   Physical Activity: Not on file   Stress: Not on file   Social Connections: Not on file   Intimate Partner Violence: Not on file   Housing Stability: Not on file      Family History   Problem Relation Age of Onset   • Arthritis Mother    • Hypertension Mother    • Hypertension Father    • Heart disease Father    • Coronary artery disease Father    • Depression Father    • Hypertension Brother    • Cancer Maternal Grandfather      Past Surgical History:   Procedure Laterality Date   • APPENDECTOMY     • COLONOSCOPY N/A 2016    Procedure: COLONOSCOPY;  Surgeon: Chitra Serrano MD;  Location: [facility]; Service:    • CORONARY ANGIOPLASTY WITH STENT PLACEMENT Right 04/15/2016   • ESOPHAGOGASTRODUODENOSCOPY N/A 2016    Procedure: ESOPHAGOGASTRODUODENOSCOPY (EGD); Surgeon: Chitra Serrano MD;  Location: [facility];   Service:        Current Outpatient Medications:   •  aspirin 81 mg chewable tablet, Chew 81 mg daily, Disp: , Rfl:   •  co-enzyme Q-10 30 MG capsule, Take 50 mg by mouth 3 (three) times a day, Disp: , Rfl:   •  desvenlafaxine (PRISTIQ) 100 mg 24 hr tablet, Take 1 tablet (100 mg total) by mouth daily, Disp: 90 tablet, Rfl: 1  •  folic acid (FOLVITE) 1 mg tablet, Take 1 mg by mouth daily, Disp: , Rfl:   •  Multiple Vitamins-Minerals (PX MENS MULTIVITAMINS) TABS, Take 1 tablet by mouth daily, Disp: , Rfl:   •  nitroglycerin (NITROSTAT) 0 4 mg SL tablet, Place under the tongue, Disp: , Rfl:   •  rosuvastatin (CRESTOR) 20 MG tablet, Take 1 tablet (20 mg total) by mouth daily, Disp: 90 tablet, Rfl: 0  •  clindamycin (CLEOCIN) 300 MG capsule, Take 300 mg by mouth every 8 (eight) hours (Patient not taking: Reported on 8/25/2021), Disp: , Rfl:   •  tamsulosin (FLOMAX) 0 4 mg, Take 1 capsule (0 4 mg total) by mouth daily with dinner, Disp: 30 capsule, Rfl: 2        Review of Systems:  Review of Systems   Respiratory: Negative for shortness of breath  Cardiovascular: Negative for chest pain, palpitations and leg swelling  All other systems reviewed and are negative  Physical Exam:  Vitals:  Vitals:    12/14/22 0845   BP: 124/72   BP Location: Right arm   Patient Position: Sitting   Cuff Size: Large   Pulse: 60   Temp: 97 8 °F (36 6 °C)   SpO2: 98%   Weight: 112 kg (248 lb)   Height: 6' (1 829 m)     Physical Exam   Constitutional: He appears healthy  No distress  Eyes: Pupils are equal, round, and reactive to light  Conjunctivae are normal    Neck: No JVD present  Cardiovascular: Normal rate, regular rhythm and normal heart sounds  Exam reveals no gallop and no friction rub  No murmur heard  Pulmonary/Chest: Effort normal and breath sounds normal  He has no wheezes  He has no rales  Musculoskeletal:         General: No tenderness, deformity or edema  Cervical back: Normal range of motion and neck supple  Neurological: He is alert and oriented to person, place, and time  Skin: Skin is warm and dry          Cardiographics:  EKG: Personally reviewed NSR at 60 bpm  Last known EF: 60% (2017)    This note was completed in part utilizing [employer] Software  Grammatical errors, random word insertions, spelling mistakes, and incomplete sentences can be an occasional consequence of this system secondary to software limitations, ambient noise, and hardware issues  If you have any questions or concerns about the content, text, or information contained within the body of this dictation, please contact the provider for clarification

## 2023-02-01 ENCOUNTER — OFFICE VISIT (OUTPATIENT)
Dept: FAMILY MEDICINE CLINIC | Facility: CLINIC | Age: 63
End: 2023-02-01

## 2023-02-01 VITALS
SYSTOLIC BLOOD PRESSURE: 132 MMHG | DIASTOLIC BLOOD PRESSURE: 74 MMHG | HEIGHT: 72 IN | RESPIRATION RATE: 14 BRPM | OXYGEN SATURATION: 97 % | HEART RATE: 66 BPM | WEIGHT: 251 LBS | BODY MASS INDEX: 34 KG/M2 | TEMPERATURE: 97.5 F

## 2023-02-01 DIAGNOSIS — R22.42 MASS OF THIGH, LEFT: ICD-10-CM

## 2023-02-01 DIAGNOSIS — M79.605 LEFT LEG PAIN: ICD-10-CM

## 2023-02-01 DIAGNOSIS — R29.2 REFLEX ABNORMALITY: ICD-10-CM

## 2023-02-01 DIAGNOSIS — M54.16 LUMBAR RADICULOPATHY, CHRONIC: Primary | ICD-10-CM

## 2023-02-01 DIAGNOSIS — R29.898 LEFT LEG WEAKNESS: ICD-10-CM

## 2023-02-01 DIAGNOSIS — R60.0 LEG EDEMA, LEFT: ICD-10-CM

## 2023-02-01 DIAGNOSIS — R26.9 NEUROLOGIC GAIT DYSFUNCTION: ICD-10-CM

## 2023-02-01 RX ORDER — UBIDECARENONE 75 MG
CAPSULE ORAL DAILY
COMMUNITY

## 2023-02-01 RX ORDER — RIBOFLAVIN (VITAMIN B2) 100 MG
100 TABLET ORAL DAILY
COMMUNITY

## 2023-02-01 RX ORDER — PREDNISONE 20 MG/1
TABLET ORAL
Qty: 32 TABLET | Refills: 0 | Status: SHIPPED | OUTPATIENT
Start: 2023-02-01

## 2023-02-01 RX ORDER — CYCLOBENZAPRINE HCL 10 MG
10 TABLET ORAL
Qty: 21 TABLET | Refills: 0 | Status: SHIPPED | OUTPATIENT
Start: 2023-02-01 | End: 2023-02-22

## 2023-02-01 NOTE — PROGRESS NOTES
Assessment/Plan:    1  Lumbar radiculopathy, chronic  -     MRI lumbar spine wo contrast; Future; Expected date: 02/01/2023  -     XR spine lumbar minimum 4 views non injury; Future; Expected date: 02/01/2023  -     Ambulatory Referral to Physical Therapy; Future  -     predniSONE 20 mg tablet; 4 tabs for three days, 3 tabs for three days, 2 tabs for three days, 1 tab for three days, 1/2 tab for 4 days  -     cyclobenzaprine (FLEXERIL) 10 mg tablet; Take 1 tablet (10 mg total) by mouth daily at bedtime for 21 days    2  Mass of thigh, left  -     US extremity soft tissue; Future; Expected date: 02/01/2023  -     MRI lumbar spine wo contrast; Future; Expected date: 02/01/2023  -     XR spine lumbar minimum 4 views non injury; Future; Expected date: 02/01/2023    3  Leg edema, left  -     VAS reflux lower limb venous duplex study with reflux assessment, complete bilateral; Future; Expected date: 02/01/2023  -     MRI lumbar spine wo contrast; Future; Expected date: 02/01/2023  -     XR spine lumbar minimum 4 views non injury; Future; Expected date: 02/01/2023    4  Left leg pain  -     VAS reflux lower limb venous duplex study with reflux assessment, complete bilateral; Future; Expected date: 02/01/2023  -     MRI lumbar spine wo contrast; Future; Expected date: 02/01/2023  -     XR spine lumbar minimum 4 views non injury; Future; Expected date: 02/01/2023  -     Ambulatory Referral to Physical Therapy; Future    5  Left leg weakness  -     MRI lumbar spine wo contrast; Future; Expected date: 02/01/2023  -     XR spine lumbar minimum 4 views non injury; Future; Expected date: 02/01/2023  -     Ambulatory Referral to Physical Therapy; Future    6  Reflex abnormality  -     MRI lumbar spine wo contrast; Future; Expected date: 02/01/2023  -     XR spine lumbar minimum 4 views non injury; Future; Expected date: 02/01/2023    7   Neurologic gait dysfunction  -     MRI lumbar spine wo contrast; Future; Expected date: 02/01/2023  -     XR spine lumbar minimum 4 views non injury; Future; Expected date: 02/01/2023  -     Ambulatory Referral to Physical Therapy; Future  -     predniSONE 20 mg tablet; 4 tabs for three days, 3 tabs for three days, 2 tabs for three days, 1 tab for three days, 1/2 tab for 4 days  -     cyclobenzaprine (FLEXERIL) 10 mg tablet; Take 1 tablet (10 mg total) by mouth daily at bedtime for 21 days          There are no Patient Instructions on file for this visit  No follow-ups on file  Subjective:      Patient ID: Sun Trevino is a 63 y o  male  Chief Complaint   Patient presents with   • Leg Pain     Left side hamstring pain  Left leg is numb from the knee down  Patient thinks it is getting worse  Onset: 2 years- worsening  Hipolito Hooker CMA        Pt is here for "ongoing leg pain"  Pt states he has a constant pain that goes from a 2-10  Pt states if he sleeps wrong gets up wrong etc it can be worse  Pain is on the left side  Behind upper thigh and lat aspect of leg  Pt states at the knee and just below is numb  This has been going on for a year  States its making it almost impossible to walk    Pt denies any injury   Generally he states he was active cycling, and landscaping    Bowel and bladder control is unaffected    Leg seems to be weak in that it wants to give out he also misjudges things    Pt states he cant run  Steps need to be done one at a time    Pt also with left sided lower back pain - this pain is rad into the left leg - this has been long standing as well  Pt has tried and failed stretcheing and nsaids    Overall his sympyoms are not going away but they seem to be progressive on a slow increasing basis        The following portions of the patient's history were reviewed and updated as appropriate: allergies, current medications, past family history, past medical history, past social history, past surgical history and problem list     Review of Systems   Constitutional: Negative for activity change, appetite change, chills, diaphoresis, fatigue, fever and unexpected weight change  HENT: Negative for congestion, dental problem, ear pain, mouth sores, sinus pressure, sinus pain, sore throat and trouble swallowing  Eyes: Negative for photophobia, discharge and itching  Respiratory: Negative for apnea, chest tightness and shortness of breath  Cardiovascular: Positive for leg swelling  Negative for chest pain and palpitations  Gastrointestinal: Negative for abdominal distention, abdominal pain, blood in stool, nausea and vomiting  Endocrine: Negative for cold intolerance, heat intolerance, polydipsia, polyphagia and polyuria  Genitourinary: Negative for difficulty urinating  Musculoskeletal: Positive for arthralgias, back pain, gait problem and myalgias  Skin: Negative for color change and wound  Neurological: Positive for weakness and numbness  Negative for dizziness, syncope, speech difficulty and headaches  Hematological: Negative for adenopathy  Psychiatric/Behavioral: Negative for agitation and behavioral problems           Current Outpatient Medications   Medication Sig Dispense Refill   • Ascorbic Acid (vitamin C) 100 MG tablet Take 100 mg by mouth daily     • aspirin 81 mg chewable tablet Chew 81 mg daily     • cyanocobalamin (VITAMIN B-12) 100 mcg tablet Take by mouth daily     • cyclobenzaprine (FLEXERIL) 10 mg tablet Take 1 tablet (10 mg total) by mouth daily at bedtime for 21 days 21 tablet 0   • desvenlafaxine (PRISTIQ) 100 mg 24 hr tablet Take 1 tablet (100 mg total) by mouth daily 90 tablet 1   • folic acid (FOLVITE) 1 mg tablet Take 1 mg by mouth daily     • Multiple Vitamins-Minerals (PX MENS MULTIVITAMINS) TABS Take 1 tablet by mouth daily     • predniSONE 20 mg tablet 4 tabs for three days, 3 tabs for three days, 2 tabs for three days, 1 tab for three days, 1/2 tab for 4 days 32 tablet 0   • rosuvastatin (CRESTOR) 20 MG tablet Take 1 tablet (20 mg total) by mouth daily 90 tablet 0     No current facility-administered medications for this visit  Objective:    /74   Pulse 66   Temp 97 5 °F (36 4 °C)   Resp 14   Ht 6' (1 829 m)   Wt 114 kg (251 lb)   SpO2 97%   BMI 34 04 kg/m²        Physical Exam  Constitutional:       Appearance: He is ill-appearing  Musculoskeletal:         General: Swelling and tenderness present  Left lower leg: Edema present  Skin:     Comments: 1 5 inch x1 inch mass in left ant thigh   Neurological:      Sensory: Sensory deficit present  Motor: Weakness present        Coordination: Coordination abnormal       Gait: Gait abnormal       Deep Tendon Reflexes: Reflexes abnormal                 Garrick Sam DO

## 2023-02-08 ENCOUNTER — HOSPITAL ENCOUNTER (OUTPATIENT)
Dept: RADIOLOGY | Facility: HOSPITAL | Age: 63
Discharge: HOME | End: 2023-02-08
Attending: FAMILY MEDICINE

## 2023-02-08 DIAGNOSIS — R22.42 MASS OF THIGH, LEFT: ICD-10-CM

## 2023-02-20 ENCOUNTER — HOSPITAL ENCOUNTER (OUTPATIENT)
Dept: RADIOLOGY | Facility: HOSPITAL | Age: 63
Discharge: HOME | End: 2023-02-20
Attending: FAMILY MEDICINE

## 2023-02-20 ENCOUNTER — TELEPHONE (OUTPATIENT)
Dept: FAMILY MEDICINE CLINIC | Facility: CLINIC | Age: 63
End: 2023-02-20

## 2023-02-20 DIAGNOSIS — I87.2 DEEP VENOUS INSUFFICIENCY: Primary | ICD-10-CM

## 2023-02-20 DIAGNOSIS — M79.605 LEFT LEG PAIN: ICD-10-CM

## 2023-02-20 DIAGNOSIS — R60.0 LEG EDEMA, LEFT: ICD-10-CM

## 2023-02-20 NOTE — TELEPHONE ENCOUNTER
Please call pt looks like Deep venous incompetance was seen on vasc study  I would suggest seeing vascular surgery for this    Order in chart

## 2023-02-27 ENCOUNTER — HOSPITAL ENCOUNTER (OUTPATIENT)
Dept: RADIOLOGY | Facility: HOSPITAL | Age: 63
Discharge: HOME | End: 2023-02-27
Attending: FAMILY MEDICINE

## 2023-02-27 DIAGNOSIS — R26.9 NEUROLOGIC GAIT DYSFUNCTION: ICD-10-CM

## 2023-02-27 DIAGNOSIS — M79.605 LEFT LEG PAIN: ICD-10-CM

## 2023-02-27 DIAGNOSIS — R22.42 MASS OF THIGH, LEFT: ICD-10-CM

## 2023-02-27 DIAGNOSIS — M54.16 LUMBAR RADICULOPATHY, CHRONIC: ICD-10-CM

## 2023-02-27 DIAGNOSIS — R29.2 REFLEX ABNORMALITY: ICD-10-CM

## 2023-02-27 DIAGNOSIS — R29.898 LEFT LEG WEAKNESS: ICD-10-CM

## 2023-02-27 DIAGNOSIS — R60.0 LEG EDEMA, LEFT: ICD-10-CM

## 2023-03-10 ENCOUNTER — TELEPHONE (OUTPATIENT)
Dept: FAMILY MEDICINE CLINIC | Facility: CLINIC | Age: 63
End: 2023-03-10

## 2023-03-10 NOTE — TELEPHONE ENCOUNTER
Called pt to discuss the results of the MRI  Looks like he has at least two levels of foraminal stenosis, this can certainly can cause back pain that radiates  Nest dtep fopr pt would be to see neuro surgery for an eval   I got pt';s machine left message for him to call back    Ok to give message

## 2023-03-16 ENCOUNTER — CONSULT (OUTPATIENT)
Dept: VASCULAR SURGERY | Facility: CLINIC | Age: 63
End: 2023-03-16

## 2023-03-16 VITALS
SYSTOLIC BLOOD PRESSURE: 122 MMHG | DIASTOLIC BLOOD PRESSURE: 68 MMHG | HEIGHT: 72 IN | HEART RATE: 70 BPM | BODY MASS INDEX: 34.27 KG/M2 | WEIGHT: 253 LBS

## 2023-03-16 DIAGNOSIS — M48.062 SPINAL STENOSIS OF LUMBAR REGION WITH NEUROGENIC CLAUDICATION: Primary | ICD-10-CM

## 2023-03-16 DIAGNOSIS — I87.2 DEEP VENOUS INSUFFICIENCY: ICD-10-CM

## 2023-03-16 DIAGNOSIS — I83.892 SYMPTOMATIC VARICOSE VEINS OF LEFT LOWER EXTREMITY: ICD-10-CM

## 2023-03-16 NOTE — LETTER
March 16, 2023     Karolina Velásquez DO  [address]  [facility] [address]    Patient: Pravin Carr III   YOB: 1960   Date of Visit: 3/16/2023       Dear Dr Vicky Haile:    Thank you for referring Paolo Escobar to me for evaluation  Below are my notes for this consultation  If you have questions, please do not hesitate to call me  I look forward to following your patient along with you           Sincerely,        Braulio Chisholm MD        CC: No Recipients

## 2023-03-16 NOTE — PROGRESS NOTES
Assessment/Plan:    Presents to the office today with left leg weakness and numbness with mild swelling and evidence of superficial venous reflux  Does have significant varicosities however his symptoms are of a neurogenic origin  MRI recently acquired shows: Spondylotic degenerative changes result in moderate to severe bilateral foraminal narrowing at L4-5 and moderate to severe left foraminal narrowing at L5-S1  No greater than mild central or foraminal narrowing elsewhere  His symptoms have been going on for a year or so while he does have moderate symptomatic varicosities I am recommending that he have a neurosurgical evaluation due to his numbness and weakness in his left leg  Plan: Requesting neurosurgical consult as soon as possible  Of after intervention I will be happy to see him back in the office for evaluation of his symptomatic varicosities at that time  Diagnoses and all orders for this visit:    Spinal stenosis of lumbar region with neurogenic claudication  -     Ambulatory Referral to Neurosurgery; Future    Deep venous insufficiency  -     Ambulatory referral to Vascular Surgery    Symptomatic varicose veins of left lower extremity        Subjective:      Patient ID: Dwight Caceres is a 63 y o  male  [facility]  Pt c/o LL swelling and pain in calf  Pt is wearing compression socks  Pt is taking Rosuvastatin and ASA  Pt is a former smoker  HPI    The following portions of the patient's history were reviewed and updated as appropriate: allergies, current medications, past family history, past medical history, past social history, past surgical history and problem list     Review of Systems   Constitutional: Negative  HENT: Negative  Eyes: Negative  Respiratory: Negative  Cardiovascular: Positive for leg swelling  Gastrointestinal: Negative  Endocrine: Negative  Genitourinary: Negative      Musculoskeletal: Negative  Skin: Negative  Allergic/Immunologic: Negative  Neurological: Negative  Hematological: Negative  Psychiatric/Behavioral: Negative  Objective: There were no vitals taken for this visit  Physical Exam      Oriented x3 no evidence of clinical depression  Eyes:  Sclera non-icteric    Skin: normal without evidence of inflammation    Neck is supple carotid pulses equal bilaterally no bruits heard    Chest lungs clear, heart regular rhythm  Abdomen soft nontender no evidence of pulsatile masses  Pulses are palpable bilateral Femoral  Popliteal, DP and PT  and he does have significant varicosities left lower extremity medial thigh and calf with moderate hyperpigmentation changes  He does have decreased motor function left leg, and numbness in the ankle region  Neurological exam intact cranial nerves 2-12 grossly intact no gross motor sensory deficits detected  Imaging viewed and reviewed with Patient    I have reviewed and made appropriate changes to the review of systems input by the medical assistant  Vitals:    03/16/23 0911   BP: 122/68   BP Location: Left arm   Patient Position: Sitting   Cuff Size: Standard   Pulse: 70   Weight: 115 kg (253 lb)   Height: 6' (1 829 m)       Patient Active Problem List   Diagnosis   • Arteriosclerotic heart disease   • 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   • Neoplasm of uncertain behavior of skin of wrist   • Neoplasm of uncertain behavior of face   • Anxiety   • Combined arterial insufficiency and corporo-venous occlusive erectile dysfunction   • Gout   • Mild episode of recurrent major depressive disorder (HCC)   • Mixed hyperlipidemia   • Lymphadenopathy of head and neck   • Severe obesity (BMI 35 0-39  9) with comorbidity ([address] )   • BMI 37 0-37 9, adult   • Symptomatic varicose veins of left lower extremity   • Mass of left thigh   • Deep venous insufficiency   • Spinal stenosis of lumbar region with neurogenic claudication       Past Surgical History:   Procedure Laterality Date   • APPENDECTOMY     • COLONOSCOPY N/A 2016    Procedure: COLONOSCOPY;  Surgeon: Seamus Macias MD;  Location: [facility] GI LAB; Service:    • CORONARY ANGIOPLASTY WITH STENT PLACEMENT Right 04/15/2016   • ESOPHAGOGASTRODUODENOSCOPY N/A 2016    Procedure: ESOPHAGOGASTRODUODENOSCOPY (EGD); Surgeon: Seamus Macias MD;  Location: [facility] GI LAB;   Service:        Family History   Problem Relation Age of Onset   • Arthritis Mother    • Hypertension Mother    • Hypertension Father    • Heart disease Father    • Coronary artery disease Father    • Depression Father    • Hypertension Brother    • Cancer Maternal Grandfather        Social History     Socioeconomic History   • Marital status: /Civil Union     Spouse name: Not on file   • Number of children: Not on file   • Years of education: Not on file   • Highest education level: Not on file   Occupational History   • Not on file   Tobacco Use   • Smoking status: Former     Packs/day: 0 50     Years: 15 00     Pack years: 7 50     Types: Cigarettes     Start date:      Quit date: 4/15/2016     Years since quittin 9   • Smokeless tobacco: Never   Vaping Use   • Vaping Use: Never used   Substance and Sexual Activity   • Alcohol use: Not Currently     Comment: 2-3 beers every cpl days   • Drug use: Yes     Frequency: 2 0 times per week     Types: Marijuana     Comment: daily    • Sexual activity: Not on file   Other Topics Concern   • Not on file   Social History Narrative    Lack of exercise    Pets/animals: Dog    Sleeps 8-10 hours a day             Social Determinants of Health     Financial Resource Strain: Not on file   Food Insecurity: Not on file   Transportation Needs: Not on file   Physical Activity: Not on file   Stress: Not on file   Social Connections: Not on file   Intimate Partner Violence: Not on file   Housing Stability: Not on file       Allergies Allergen Reactions   • Amoxicillin Itching   • Lipitor [Atorvastatin]          Current Outpatient Medications:   •  Ascorbic Acid (vitamin C) 100 MG tablet, Take 100 mg by mouth daily, Disp: , Rfl:   •  aspirin 81 mg chewable tablet, Chew 81 mg daily, Disp: , Rfl:   •  cyclobenzaprine (FLEXERIL) 10 mg tablet, Take 1 tablet (10 mg total) by mouth daily at bedtime for 21 days, Disp: 21 tablet, Rfl: 0  •  desvenlafaxine (PRISTIQ) 100 mg 24 hr tablet, Take 1 tablet (100 mg total) by mouth daily, Disp: 90 tablet, Rfl: 1  •  Multiple Vitamins-Minerals (PX MENS MULTIVITAMINS) TABS, Take 1 tablet by mouth daily, Disp: , Rfl:   •  rosuvastatin (CRESTOR) 20 MG tablet, Take 1 tablet (20 mg total) by mouth daily, Disp: 90 tablet, Rfl: 0  •  cyanocobalamin (VITAMIN B-12) 100 mcg tablet, Take by mouth daily (Patient not taking: Reported on 3/16/2023), Disp: , Rfl:   •  folic acid (FOLVITE) 1 mg tablet, Take 1 mg by mouth daily (Patient not taking: Reported on 3/16/2023), Disp: , Rfl:   •  predniSONE 20 mg tablet, 4 tabs for three days, 3 tabs for three days, 2 tabs for three days, 1 tab for three days, 1/2 tab for 4 days, Disp: 32 tablet, Rfl: 0

## 2023-03-16 NOTE — PATIENT INSTRUCTIONS
Presents to the office today with left leg weakness and numbness with mild swelling and evidence of superficial venous reflux  Does have significant varicosities however his symptoms are of a neurogenic origin  MRI recently acquired shows: Spondylotic degenerative changes result in moderate to severe bilateral foraminal narrowing at L4-5 and moderate to severe left foraminal narrowing at L5-S1  No greater than mild central or foraminal narrowing elsewhere  His symptoms have been going on for a year or so while he does have moderate symptomatic varicosities I am recommending that he have a neurosurgical evaluation due to his numbness and weakness in his left leg  Plan: Requesting neurosurgical consult as soon as possible  Of after intervention I will be happy to see him back in the office for evaluation of his symptomatic varicosities at that time

## 2023-03-28 ENCOUNTER — CONSULT (OUTPATIENT)
Dept: NEUROSURGERY | Facility: CLINIC | Age: 63
End: 2023-03-28

## 2023-03-28 VITALS
HEART RATE: 69 BPM | BODY MASS INDEX: 33.86 KG/M2 | WEIGHT: 250 LBS | TEMPERATURE: 98.3 F | HEIGHT: 72 IN | DIASTOLIC BLOOD PRESSURE: 72 MMHG | SYSTOLIC BLOOD PRESSURE: 111 MMHG | RESPIRATION RATE: 18 BRPM

## 2023-03-28 DIAGNOSIS — M47.816 LUMBAR SPONDYLOSIS: ICD-10-CM

## 2023-03-28 DIAGNOSIS — R29.898 WEAKNESS OF LEFT HIP: ICD-10-CM

## 2023-03-28 DIAGNOSIS — M48.062 SPINAL STENOSIS OF LUMBAR REGION WITH NEUROGENIC CLAUDICATION: Primary | ICD-10-CM

## 2023-03-28 RX ORDER — LANOLIN ALCOHOL/MO/W.PET/CERES
1 CREAM (GRAM) TOPICAL DAILY
COMMUNITY

## 2023-03-29 ENCOUNTER — TELEPHONE (OUTPATIENT)
Dept: PAIN MEDICINE | Facility: CLINIC | Age: 63
End: 2023-03-29

## 2023-03-29 NOTE — TELEPHONE ENCOUNTER
Scheduled patient for LESI 3/31/23  Patient is taking ASA 81mg  Nothing to eat or drink 1 hour prior to procedure  Needs to arrange transportation  Proper clothing for procedure  No vaccines 2 weeks prior or after procedure  If ill or place on antibiotics, please call to reschedule

## 2023-03-31 ENCOUNTER — APPOINTMENT (OUTPATIENT)
Dept: RADIOLOGY | Facility: HOSPITAL | Age: 63
End: 2023-03-31

## 2023-03-31 ENCOUNTER — HOSPITAL ENCOUNTER (OUTPATIENT)
Facility: AMBULARY SURGERY CENTER | Age: 63
Setting detail: OUTPATIENT SURGERY
Discharge: HOME | End: 2023-03-31
Attending: PHYSICAL MEDICINE & REHABILITATION | Admitting: PHYSICAL MEDICINE & REHABILITATION

## 2023-03-31 VITALS
RESPIRATION RATE: 20 BRPM | TEMPERATURE: 96.9 F | HEART RATE: 73 BPM | OXYGEN SATURATION: 95 % | SYSTOLIC BLOOD PRESSURE: 133 MMHG | DIASTOLIC BLOOD PRESSURE: 71 MMHG

## 2023-03-31 DIAGNOSIS — R52 PAIN: ICD-10-CM

## 2023-03-31 RX ORDER — SODIUM CHLORIDE 9 MG/ML
INJECTION INTRAVENOUS AS NEEDED
Status: DISCONTINUED | OUTPATIENT
Start: 2023-03-31 | End: 2023-03-31 | Stop reason: HOSPADM

## 2023-03-31 RX ORDER — METHYLPREDNISOLONE ACETATE 80 MG/ML
INJECTION, SUSPENSION INTRA-ARTICULAR; INTRALESIONAL; INTRAMUSCULAR; SOFT TISSUE AS NEEDED
Status: DISCONTINUED | OUTPATIENT
Start: 2023-03-31 | End: 2023-03-31 | Stop reason: HOSPADM

## 2023-03-31 RX ORDER — LIDOCAINE HYDROCHLORIDE 10 MG/ML
INJECTION, SOLUTION EPIDURAL; INFILTRATION; INTRACAUDAL; PERINEURAL AS NEEDED
Status: DISCONTINUED | OUTPATIENT
Start: 2023-03-31 | End: 2023-03-31 | Stop reason: HOSPADM

## 2023-03-31 NOTE — H&P
History of Present Illness: The patient is a 63 y o  male who presents with complaints of low back pain    Past Medical History:   Diagnosis Date   • Coronary artery disease    • Depression    • Dermoid cyst of face     resolved 04/19/2016   • Hyperlipidemia    • Myocardial infarction [address] [address] ) 04/15/2016    99% RCA block   • Presenile dementia, depressed type     onst 11/03/2009, resolved 07/21/2016   • Psychiatric disorder     depression       Past Surgical History:   Procedure Laterality Date   • APPENDECTOMY     • COLONOSCOPY N/A 12/14/2016    Procedure: COLONOSCOPY;  Surgeon: Cheryl Cruz MD;  Location: [facility] GI LAB; Service:    • CORONARY ANGIOPLASTY WITH STENT PLACEMENT Right 04/15/2016   • ESOPHAGOGASTRODUODENOSCOPY N/A 12/14/2016    Procedure: ESOPHAGOGASTRODUODENOSCOPY (EGD); Surgeon: Cheryl Cruz MD;  Location: [facility] GI LAB; Service:        No current facility-administered medications for this encounter  Allergies   Allergen Reactions   • Amoxicillin Itching   • Lipitor [Atorvastatin]        Physical Exam:   Vitals:    03/31/23 1459   BP: 133/72   Pulse: 82   Resp: 18   Temp: (!) 96 9 °F (36 1 °C)   SpO2: 98%     General: Awake, Alert, Oriented x 3, Mood and affect appropriate  Respiratory: Respirations even and unlabored  Cardiovascular: Peripheral pulses intact; no edema  Musculoskeletal Exam: Tenderness to palpation bilateral lumbar paraspinals    ASA Score: 2    Patient/Chart Verification  Patient ID Verified: Verbal, Armband  ID Band Applied: Yes  Consents Confirmed: Procedural  H&P( within 30 days) Verified: Yes  Interval H&P(within 24 hr) Complete (required for Outpatients and Surgery Admit only): Yes  Beta Blocker given : N/A  Pre-op Lab/Test Results Available: In chart  Does Patient Have a Prosthetic Device/Implant: No    Assessment:   1   Pain        Plan:

## 2023-03-31 NOTE — DISCHARGE INSTRUCTIONS
Epidural Steroid Injection   WHAT YOU NEED TO KNOW:   An epidural steroid injection (GLENDY) is a procedure to inject steroid medicine into the epidural space  The epidural space is between your spinal cord and vertebrae  Steroids reduce inflammation and fluid buildup in your spine that may be causing pain  You may be given pain medicine along with the steroids  ACTIVITY  Do not drive or operate machinery today  No strenuous activity today - bending, lifting, etc   You may resume normal activites starting tomorrow - start slowly and as tolerated  You may shower today, but no tub baths or hot tubs  You may have numbness for several hours from the local anesthetic  Please use caution and common sense, especially with weight-bearing activities  CARE OF THE INJECTION SITE  If you have soreness or pain, apply ice to the area today (20 minutes on/20 minutes off)  Starting tomorrow, you may use warm, moist heat or ice if needed  You may have an increase or change in your discomfort for 36-48 hours after your treatment  Apply ice and continue with any pain medication you have been prescribed  Notify the [facility] if you have any of the following: redness, drainage, swelling, headache, stiff neck or fever above 100°F     SPECIAL INSTRUCTIONS  Our office will contact you in approximately 7 days for a progress report  MEDICATIONS  Continue to take all routine medications  Our office may have instructed you to hold some medications  As no general anesthesia was used in today's procedure, you should not experience any side effects related to anesthesia  If you are diabetic, the steroids used in today's injection may temporarily increase your blood sugar levels after the first few days after your injection  Please keep a close eye on your sugars and alert the doctor who manages your diabetes if your sugars are significantly high from your baseline or you are symptomatic       If you have a problem specifically related to your procedure, please call our office at [phone]  Problems not related to your procedure should be directed to your primary care physician

## 2023-03-31 NOTE — OP NOTE
OPERATIVE REPORT  PATIENT NAME: Michaelle Kim III    :  1960  MRN: [id number]  Pt Location: [facility] MINOR/PAIN ROOM 01    SURGERY DATE: 3/31/2023    Surgeon(s) and Role:     * DO Jonathan Brown Primary    Preop Diagnosis:  Lumbar stenosis with neurogenic claudication [M48 062]  Lumbar spondylosis [M47 816]    Post-Op Diagnosis Codes:     * Lumbar stenosis with neurogenic claudication [M48 062]     * Lumbar spondylosis [M47 816]    Procedure(s):  L5 S1 LUMBAR epidural steroid injection (40516)    Lumbar epidural  Indication:  Back and radiating leg pain  Preoperative diagnosis:  Lumbar radiculitis  Postoperative diagnosis:  Lumbar radiculitis    Procedure: Fluoroscopically-guided L5-S1 interlaminar epidural steroid injection under fluoroscopy    EBL:  none  Specimens:  not applicable    After discussing the risks, benefits, and alternatives to the procedure, the patient expressed understanding and wished to proceed  The patient was brought to the fluoroscopy suite and placed in the prone position  A procedural pause was conducted to verify:  correct patient identity, procedure to be performed and as applicable, correct side and site, correct patient position, and availability of implants, special equipment and special requirements  After identifying the L5-S1 space fluoroscopically, the skin was sterilely prepped and draped in the usual fashion using Chloraprep skin prep  The skin and subcutaneous tissues were anesthetized with 1% lidocaine  Utilizing a loss of resistance technique and intermittent fluoroscopic guidance, a 3 5 inch 20-gauge Tuohy needle was advanced into the epidural space  Proper needle positioning was confirmed using multiple fluoroscopic views  After negative aspiration, Omnipaque 240 contrast was injected confirming epidural spread without evidence of intravascular or intrathecal spread    A 4 ml solution consisting of 80 mg Depo-Medrol in sterile saline was injected slowly and incrementally into the epidural space  Following the injection the needle was withdrawn slightly and flushed with 1% buffered lidocaine as it was fully extracted  The patient tolerated the procedure well and there were no apparent complications  After appropriate observation, the patient was dismissed from the clinic in good condition under their own power            SIGNATURE: Dmitry Au DO  DATE: March 31, 2023  TIME: 3:13 PM

## 2023-04-05 DIAGNOSIS — I21.11 ST ELEVATION (STEMI) MYOCARDIAL INFARCTION INVOLVING RIGHT CORONARY ARTERY (HCC): ICD-10-CM

## 2023-04-05 DIAGNOSIS — I25.10 ARTERIOSCLEROTIC HEART DISEASE: ICD-10-CM

## 2023-04-06 RX ORDER — ROSUVASTATIN CALCIUM 20 MG/1
20 TABLET, COATED ORAL DAILY
Qty: 90 TABLET | Refills: 0 | Status: SHIPPED | OUTPATIENT
Start: 2023-04-06

## 2023-04-06 NOTE — TELEPHONE ENCOUNTER
Please call patient and let him know that he needs to have fasting lipids drawn prior to any more refills

## 2023-04-07 ENCOUNTER — TELEPHONE (OUTPATIENT)
Dept: RADIOLOGY | Facility: MEDICAL CENTER | Age: 63
End: 2023-04-07

## 2023-04-11 NOTE — TELEPHONE ENCOUNTER
Patient Reports     50    %     improvement post injection    Pain Level    not constant  but  a 5-6 /10     Patient is aware we will call back next week for an update

## 2023-06-12 ENCOUNTER — APPOINTMENT (OUTPATIENT)
Dept: LAB | Facility: CLINIC | Age: 63
End: 2023-06-12
Payer: COMMERCIAL

## 2023-06-12 ENCOUNTER — TRANSCRIBE ORDERS (OUTPATIENT)
Dept: LAB | Facility: CLINIC | Age: 63
End: 2023-06-12

## 2023-06-12 DIAGNOSIS — Z00.8 HEALTH EXAMINATION IN POPULATION SURVEYS: Primary | ICD-10-CM

## 2023-06-12 LAB
ALBUMIN SERPL BCP-MCNC: 3.8 G/DL (ref 3.5–5)
ALP SERPL-CCNC: 81 U/L (ref 46–116)
ALT SERPL W P-5'-P-CCNC: 32 U/L (ref 12–78)
ANION GAP SERPL CALCULATED.3IONS-SCNC: 1 MMOL/L (ref 4–13)
AST SERPL W P-5'-P-CCNC: 14 U/L (ref 5–45)
BILIRUB SERPL-MCNC: 0.7 MG/DL (ref 0.2–1)
BUN SERPL-MCNC: 15 MG/DL (ref 5–25)
CALCIUM SERPL-MCNC: 8.8 MG/DL (ref 8.3–10.1)
CHLORIDE SERPL-SCNC: 110 MMOL/L (ref 96–108)
CHOLEST SERPL-MCNC: 103 MG/DL
CO2 SERPL-SCNC: 29 MMOL/L (ref 21–32)
CREAT SERPL-MCNC: 1.04 MG/DL (ref 0.6–1.3)
ERYTHROCYTE [DISTWIDTH] IN BLOOD BY AUTOMATED COUNT: 12.9 % (ref 11.6–15.1)
GFR SERPL CREATININE-BSD FRML MDRD: 77 ML/MIN/1.73SQ M
GLUCOSE P FAST SERPL-MCNC: 103 MG/DL (ref 65–99)
HCT VFR BLD AUTO: 41.8 % (ref 36.5–49.3)
HDLC SERPL-MCNC: 46 MG/DL
HGB BLD-MCNC: 13.7 G/DL (ref 12–17)
LDLC SERPL CALC-MCNC: 45 MG/DL (ref 0–100)
MCH RBC QN AUTO: 31.2 PG (ref 26.8–34.3)
MCHC RBC AUTO-ENTMCNC: 32.8 G/DL (ref 31.4–37.4)
MCV RBC AUTO: 95 FL (ref 82–98)
NONHDLC SERPL-MCNC: 57 MG/DL
PLATELET # BLD AUTO: 187 THOUSANDS/UL (ref 149–390)
PMV BLD AUTO: 10.5 FL (ref 8.9–12.7)
POTASSIUM SERPL-SCNC: 3.6 MMOL/L (ref 3.5–5.3)
PROT SERPL-MCNC: 6.6 G/DL (ref 6.4–8.4)
RBC # BLD AUTO: 4.39 MILLION/UL (ref 3.88–5.62)
SODIUM SERPL-SCNC: 140 MMOL/L (ref 135–147)
TRIGL SERPL-MCNC: 58 MG/DL
WBC # BLD AUTO: 4.91 THOUSAND/UL (ref 4.31–10.16)

## 2023-06-12 PROCEDURE — 83036 HEMOGLOBIN GLYCOSYLATED A1C: CPT

## 2023-06-13 LAB
EST. AVERAGE GLUCOSE BLD GHB EST-MCNC: 103 MG/DL
HBA1C MFR BLD: 5.2 %

## 2023-06-14 ENCOUNTER — HOSPITAL ENCOUNTER (OUTPATIENT)
Dept: RADIOLOGY | Facility: HOSPITAL | Age: 63
Setting detail: OUTPATIENT SURGERY
Discharge: HOME | End: 2023-06-14
Payer: COMMERCIAL

## 2023-06-14 ENCOUNTER — OFFICE VISIT (OUTPATIENT)
Dept: CARDIOLOGY CLINIC | Facility: CLINIC | Age: 63
End: 2023-06-14
Payer: COMMERCIAL

## 2023-06-14 ENCOUNTER — HOSPITAL ENCOUNTER (OUTPATIENT)
Facility: AMBULARY SURGERY CENTER | Age: 63
Setting detail: OUTPATIENT SURGERY
Discharge: HOME | End: 2023-06-14
Attending: PHYSICAL MEDICINE & REHABILITATION | Admitting: PHYSICAL MEDICINE & REHABILITATION
Payer: COMMERCIAL

## 2023-06-14 VITALS
HEIGHT: 72 IN | HEART RATE: 72 BPM | DIASTOLIC BLOOD PRESSURE: 80 MMHG | WEIGHT: 252 LBS | BODY MASS INDEX: 34.13 KG/M2 | SYSTOLIC BLOOD PRESSURE: 120 MMHG | OXYGEN SATURATION: 94 %

## 2023-06-14 VITALS
OXYGEN SATURATION: 98 % | HEART RATE: 57 BPM | SYSTOLIC BLOOD PRESSURE: 136 MMHG | TEMPERATURE: 97.4 F | RESPIRATION RATE: 18 BRPM | DIASTOLIC BLOOD PRESSURE: 81 MMHG

## 2023-06-14 DIAGNOSIS — E78.2 MIXED HYPERLIPIDEMIA: ICD-10-CM

## 2023-06-14 DIAGNOSIS — I25.2 HISTORY OF ST ELEVATION MYOCARDIAL INFARCTION (STEMI): ICD-10-CM

## 2023-06-14 DIAGNOSIS — I25.10 ARTERIOSCLEROTIC HEART DISEASE: Primary | ICD-10-CM

## 2023-06-14 DIAGNOSIS — Z92.241 S/P EPIDURAL STEROID INJECTION: ICD-10-CM

## 2023-06-14 PROCEDURE — 93000 ELECTROCARDIOGRAM COMPLETE: CPT | Performed by: INTERNAL MEDICINE

## 2023-06-14 PROCEDURE — NC001 PR NO CHARGE: Performed by: PHYSICAL MEDICINE & REHABILITATION

## 2023-06-14 PROCEDURE — 62323 NJX INTERLAMINAR LMBR/SAC: CPT | Performed by: PHYSICAL MEDICINE & REHABILITATION

## 2023-06-14 PROCEDURE — 99214 OFFICE O/P EST MOD 30 MIN: CPT | Performed by: INTERNAL MEDICINE

## 2023-06-14 RX ORDER — METHYLPREDNISOLONE ACETATE 80 MG/ML
INJECTION, SUSPENSION INTRA-ARTICULAR; INTRALESIONAL; INTRAMUSCULAR; SOFT TISSUE AS NEEDED
Status: DISCONTINUED | OUTPATIENT
Start: 2023-06-14 | End: 2023-06-14 | Stop reason: HOSPADM

## 2023-06-14 RX ORDER — LIDOCAINE HYDROCHLORIDE 10 MG/ML
INJECTION, SOLUTION EPIDURAL; INFILTRATION; INTRACAUDAL; PERINEURAL AS NEEDED
Status: DISCONTINUED | OUTPATIENT
Start: 2023-06-14 | End: 2023-06-14 | Stop reason: HOSPADM

## 2023-06-14 NOTE — DISCHARGE INSTRUCTIONS
Epidural Steroid Injection   WHAT YOU NEED TO KNOW:   An epidural steroid injection (GLENDY) is a procedure to inject steroid medicine into the epidural space  The epidural space is between your spinal cord and vertebrae  Steroids reduce inflammation and fluid buildup in your spine that may be causing pain  You may be given pain medicine along with the steroids  ACTIVITY  Do not drive or operate machinery today  No strenuous activity today - bending, lifting, etc   You may resume normal activites starting tomorrow - start slowly and as tolerated  You may shower today, but no tub baths or hot tubs  You may have numbness for several hours from the local anesthetic  Please use caution and common sense, especially with weight-bearing activities  CARE OF THE INJECTION SITE  If you have soreness or pain, apply ice to the area today (20 minutes on/20 minutes off)  Starting tomorrow, you may use warm, moist heat or ice if needed  You may have an increase or change in your discomfort for 36-48 hours after your treatment  Apply ice and continue with any pain medication you have been prescribed  Notify the [facility] if you have any of the following: redness, drainage, swelling, headache, stiff neck or fever above 100°F     SPECIAL INSTRUCTIONS  Our office will contact you in approximately 7 days for a progress report  MEDICATIONS  Continue to take all routine medications  Our office may have instructed you to hold some medications  As no general anesthesia was used in today's procedure, you should not experience any side effects related to anesthesia  If you are diabetic, the steroids used in today's injection may temporarily increase your blood sugar levels after the first few days after your injection  Please keep a close eye on your sugars and alert the doctor who manages your diabetes if your sugars are significantly high from your baseline or you are symptomatic       If you have a problem specifically related to your procedure, please call our office at [phone]  Problems not related to your procedure should be directed to your primary care physician

## 2023-06-14 NOTE — OP NOTE
OPERATIVE REPORT  PATIENT NAME: Charanjit Bonilla III    :  1960  MRN: [id number]  Pt Location: [facility] MINOR/PAIN ROOM 01    SURGERY DATE: 2023    Surgeon(s) and Role:     * Carlus Aschoff, DO - Primary    Preop Diagnosis:  Lumbar stenosis with neurogenic claudication [M48 062]  Lumbar spondylosis [M47 816]    Post-Op Diagnosis Codes:     * Lumbar stenosis with neurogenic claudication [M48 062]     * Lumbar spondylosis [M47 816]    Procedure(s):  L5 S1  LUMBAR epidural steroid injection (06482)    Lumbar epidural  Indication:  Back and radiating leg pain  Preoperative diagnosis:  Lumbar radiculitis  Postoperative diagnosis:  Lumbar radiculitis    Procedure: Fluoroscopically-guided L5-S1 interlaminar epidural steroid injection under fluoroscopy    EBL:  none  Specimens:  not applicable    After discussing the risks, benefits, and alternatives to the procedure, the patient expressed understanding and wished to proceed  The patient was brought to the fluoroscopy suite and placed in the prone position  A procedural pause was conducted to verify:  correct patient identity, procedure to be performed and as applicable, correct side and site, correct patient position, and availability of implants, special equipment and special requirements  After identifying the L5-S1 space fluoroscopically, the skin was sterilely prepped and draped in the usual fashion using Chloraprep skin prep  The skin and subcutaneous tissues were anesthetized with 1% lidocaine  Utilizing a loss of resistance technique and intermittent fluoroscopic guidance, a 3 5 inch 20-gauge Tuohy needle was advanced into the epidural space  Proper needle positioning was confirmed using multiple fluoroscopic views  After negative aspiration, Omnipaque 240 contrast was injected confirming epidural spread without evidence of intravascular or intrathecal spread    A 4 ml solution consisting of 80 mg Depo-Medrol in sterile saline was injected slowly and incrementally into the epidural space  Following the injection the needle was withdrawn slightly and flushed with 1% buffered lidocaine as it was fully extracted  The patient tolerated the procedure well and there were no apparent complications  After appropriate observation, the patient was dismissed from the clinic in good condition under their own power            SIGNATURE: Rory Keane DO  DATE: June 14, 2023  TIME: 3:14 PM

## 2023-06-14 NOTE — PROGRESS NOTES
Cardiology   Dennise Rodriguez DO, Vita Nevarez MD, Sandra Chamorro MD, Talia Sam MD, [facility] - [place]  -------------------------------------------------------------------  [facility] ORTHOPEDIC [facility]  [address], [address]  [place], [address] [place] [address], [address] [place] [address]  [phone]    Cardiology Follow Up  Bettie Clemente III  1960  [id number]          Assessment/Plan:    1  Arteriosclerotic heart disease    2  History of ST elevation myocardial infarction (STEMI)    3  Mixed hyperlipidemia      - He is feeling well without any symptoms - continue current Rx   - Continue ASA  - Continue Crestor - lipid panel reviewed  - BP goal is < 130/80 - at goal today  - Discussed diet and exercise in detail    Interval History:     Bettie Clemente III is 66 y o  male here for followup of CAD  Since his last visit, he has been feeling well   he denies any palpitations, chest pain, shortness of breath, LE edema, orthopnea or PND  Diet is overall unchanged  There has not been a significant change in weight  He will be undergoing epidural injection today  Blood work done on 6/12/23 - LDL was 45 mg/dL  Renal and liver function were normal       Currently he is using rosuvastatin 20 mg daily and aspirin 81 mg daily  The patient has a history of CAD  In April of 2016, he underwent PCI to a 99% mRCA lesion after a STEMI  He was driving and felt tightness in the center part of his chest associated with diaphoresis         Past Medical History:   Diagnosis Date   • Coronary artery disease    • Depression    • Dermoid cyst of face     resolved 04/19/2016   • Hyperlipidemia    • Myocardial infarction ([facility] ) 04/15/2016    99% RCA block   • Presenile dementia, depressed type ([facility] )     onst 11/03/2009, resolved 07/21/2016   • Psychiatric disorder     depression     Social History     Socioeconomic History   • Marital status: /Civil Union     Spouse name: Not on file   • Number of children: Not on file   • Years of education: Not on file   • Highest education level: Not on file   Occupational History   • Not on file   Tobacco Use   • Smoking status: Former     Packs/day: 0 50     Years: 15 00     Total pack years: 7 50     Types: Cigarettes     Start date:      Quit date: 4/15/2016     Years since quittin 1   • Smokeless tobacco: Never   Vaping Use   • Vaping Use: Never used   Substance and Sexual Activity   • Alcohol use: Not Currently     Comment: 2-3 beers every cpl days   • Drug use: Yes     Frequency: 2 0 times per week     Types: Marijuana     Comment: daily    • Sexual activity: Not on file   Other Topics Concern   • Not on file   Social History Narrative    Lack of exercise    Pets/animals: Dog    Sleeps 8-10 hours a day             Social Determinants of Health     Financial Resource Strain: Not on file   Food Insecurity: Not on file   Transportation Needs: Not on file   Physical Activity: Not on file   Stress: Not on file   Social Connections: Not on file   Intimate Partner Violence: Not on file   Housing Stability: Not on file      Family History   Problem Relation Age of Onset   • Arthritis Mother    • Hypertension Mother    • Hypertension Father    • Heart disease Father    • Coronary artery disease Father    • Depression Father    • Hypertension Brother    • Cancer Maternal Grandfather      Past Surgical History:   Procedure Laterality Date   • APPENDECTOMY     • COLONOSCOPY N/A 2016    Procedure: COLONOSCOPY;  Surgeon: Jim Davis MD;  Location: [place] [address] GI LAB; Service:    • CORONARY ANGIOPLASTY WITH STENT PLACEMENT Right 04/15/2016   • EPIDURAL BLOCK INJECTION N/A 3/31/2023    Procedure: L5 S1 LUMBAR epidural steroid injection (15162); Surgeon: Anel Garcia DO;  Location: [facility] MAIN OR;  Service: Pain Management    • ESOPHAGOGASTRODUODENOSCOPY N/A 2016    Procedure: ESOPHAGOGASTRODUODENOSCOPY (EGD);   Surgeon: Jim Davis MD; Location: [place] [address] GI LAB; Service:        Current Outpatient Medications:   •  aspirin 81 mg chewable tablet, Chew 81 mg daily, Disp: , Rfl:   •  cyanocobalamin (VITAMIN B-12) 100 mcg tablet, Take by mouth daily, Disp: , Rfl:   •  desvenlafaxine (PRISTIQ) 100 mg 24 hr tablet, Take 1 tablet (100 mg total) by mouth daily, Disp: 90 tablet, Rfl: 1  •  folic acid (FOLVITE) 1 mg tablet, Take 1 mg by mouth daily, Disp: , Rfl:   •  glucosamine-chondroitin 500-400 MG tablet, Take 1 tablet by mouth in the morning, Disp: , Rfl:   •  Multiple Vitamins-Minerals (PX MENS MULTIVITAMINS) TABS, Take 1 tablet by mouth daily, Disp: , Rfl:   •  rosuvastatin (CRESTOR) 20 MG tablet, Take 1 tablet (20 mg total) by mouth daily, Disp: 90 tablet, Rfl: 0        Review of Systems:  Review of Systems   Respiratory: Negative for shortness of breath  Cardiovascular: Negative for chest pain, palpitations and leg swelling  Musculoskeletal: Positive for arthralgias and back pain  All other systems reviewed and are negative  Physical Exam:  Vitals:  Vitals:    06/14/23 0825   BP: 120/80   BP Location: Right arm   Patient Position: Sitting   Cuff Size: Large   Pulse: 72   SpO2: 94%   Weight: 114 kg (252 lb)   Height: 6' (1 829 m)     Physical Exam   Constitutional: He appears healthy  No distress  Eyes: Pupils are equal, round, and reactive to light  Conjunctivae are normal    Neck: No JVD present  Cardiovascular: Normal rate, regular rhythm and normal heart sounds  Exam reveals no gallop and no friction rub  No murmur heard  Pulmonary/Chest: Effort normal and breath sounds normal  He has no wheezes  He has no rales  Musculoskeletal:         General: No tenderness, deformity or edema  Cervical back: Normal range of motion and neck supple  Neurological: He is alert and oriented to person, place, and time  Skin: Skin is warm and dry          Cardiographics:  EKG: Personally reviewed NSR at 70 bpm  Last known EF: 60% (2017)    This note was completed in part utilizing M-Modal Fluency Direct Software  Grammatical errors, random word insertions, spelling mistakes, and incomplete sentences can be an occasional consequence of this system secondary to software limitations, ambient noise, and hardware issues  If you have any questions or concerns about the content, text, or information contained within the body of this dictation, please contact the provider for clarification

## 2023-06-21 ENCOUNTER — TELEPHONE (OUTPATIENT)
Dept: PAIN MEDICINE | Facility: CLINIC | Age: 63
End: 2023-06-21

## 2023-08-12 DIAGNOSIS — F33.0 MILD EPISODE OF RECURRENT MAJOR DEPRESSIVE DISORDER (HCC): ICD-10-CM

## 2023-08-12 DIAGNOSIS — I21.11 ST ELEVATION (STEMI) MYOCARDIAL INFARCTION INVOLVING RIGHT CORONARY ARTERY (HCC): ICD-10-CM

## 2023-08-12 DIAGNOSIS — I25.10 ARTERIOSCLEROTIC HEART DISEASE: ICD-10-CM

## 2023-08-12 RX ORDER — DESVENLAFAXINE 100 MG/1
100 TABLET, EXTENDED RELEASE ORAL DAILY
Qty: 90 TABLET | Refills: 0 | Status: SHIPPED | OUTPATIENT
Start: 2023-08-12

## 2023-08-12 RX ORDER — ROSUVASTATIN CALCIUM 20 MG/1
20 TABLET, COATED ORAL DAILY
Qty: 90 TABLET | Refills: 0 | Status: SHIPPED | OUTPATIENT
Start: 2023-08-12

## 2023-10-10 ENCOUNTER — OFFICE VISIT (OUTPATIENT)
Age: 63
End: 2023-10-10
Payer: COMMERCIAL

## 2023-10-10 VITALS
HEART RATE: 53 BPM | BODY MASS INDEX: 34.13 KG/M2 | HEIGHT: 72 IN | SYSTOLIC BLOOD PRESSURE: 156 MMHG | DIASTOLIC BLOOD PRESSURE: 93 MMHG | WEIGHT: 252 LBS

## 2023-10-10 DIAGNOSIS — M54.42 CHRONIC BILATERAL LOW BACK PAIN WITH LEFT-SIDED SCIATICA: Primary | ICD-10-CM

## 2023-10-10 DIAGNOSIS — M54.2 NECK PAIN: ICD-10-CM

## 2023-10-10 DIAGNOSIS — M79.18 MYOFASCIAL PAIN: ICD-10-CM

## 2023-10-10 DIAGNOSIS — G89.29 CHRONIC BILATERAL LOW BACK PAIN WITH LEFT-SIDED SCIATICA: Primary | ICD-10-CM

## 2023-10-10 PROCEDURE — 99203 OFFICE O/P NEW LOW 30 MIN: CPT | Performed by: CHIROPRACTOR

## 2023-10-10 PROCEDURE — 98941 CHIROPRACT MANJ 3-4 REGIONS: CPT | Performed by: CHIROPRACTOR

## 2023-10-17 ENCOUNTER — PROCEDURE VISIT (OUTPATIENT)
Age: 63
End: 2023-10-17

## 2023-10-17 VITALS
HEART RATE: 66 BPM | WEIGHT: 252 LBS | BODY MASS INDEX: 34.13 KG/M2 | DIASTOLIC BLOOD PRESSURE: 89 MMHG | HEIGHT: 72 IN | SYSTOLIC BLOOD PRESSURE: 135 MMHG

## 2023-10-17 DIAGNOSIS — M54.2 NECK PAIN: ICD-10-CM

## 2023-10-17 DIAGNOSIS — M54.42 CHRONIC BILATERAL LOW BACK PAIN WITH LEFT-SIDED SCIATICA: Primary | ICD-10-CM

## 2023-10-17 DIAGNOSIS — G89.29 CHRONIC BILATERAL LOW BACK PAIN WITH LEFT-SIDED SCIATICA: Primary | ICD-10-CM

## 2023-10-17 DIAGNOSIS — M79.18 MYOFASCIAL PAIN: ICD-10-CM

## 2023-10-20 ENCOUNTER — PROCEDURE VISIT (OUTPATIENT)
Age: 63
End: 2023-10-20

## 2023-10-20 VITALS
WEIGHT: 252 LBS | HEIGHT: 72 IN | BODY MASS INDEX: 34.13 KG/M2 | HEART RATE: 69 BPM | DIASTOLIC BLOOD PRESSURE: 86 MMHG | SYSTOLIC BLOOD PRESSURE: 143 MMHG

## 2023-10-20 DIAGNOSIS — M54.2 NECK PAIN: ICD-10-CM

## 2023-10-20 DIAGNOSIS — G89.29 CHRONIC BILATERAL LOW BACK PAIN WITH LEFT-SIDED SCIATICA: Primary | ICD-10-CM

## 2023-10-20 DIAGNOSIS — M54.42 CHRONIC BILATERAL LOW BACK PAIN WITH LEFT-SIDED SCIATICA: Primary | ICD-10-CM

## 2023-10-20 DIAGNOSIS — M79.18 MYOFASCIAL PAIN: ICD-10-CM

## 2023-10-20 NOTE — PROGRESS NOTES
HPI:  Juve Lusty returns for treatment today he reports ongoing symptomatology pain into the left hip. No other changes noted today. The following portions of the patient's history were reviewed and updated as appropriate: allergies, current medications, past family history, past medical history, past social history, past surgical history, and problem list.    Review of Systems    Physical Exam:  Exam reveals Juve Lusty in some distress arises slowly left-sided parasacral hypertonicity noted pain on lumbar extension left lateral bending right lateral bending    Joint dysfunction left SI L4-5 L5-S1    Assessment:   Diagnosis ICD-10-CM Associated Orders   1. Chronic bilateral low back pain with left-sided sciatica  M54.42     G89.29       2. Myofascial pain  M79.18       3. Neck pain  M54.2                 Treatment: 09595  Manipulation to the left innominate, sacrum, L5 via side-lying flexion distraction manipulation well-tolerated     Discussion:  Continue stretching and icing at home.

## 2023-10-20 NOTE — PROGRESS NOTES
HPI:  Joann Mercado returns for treatment today ongoing low back pain and stiffness into the left lower extremity. He feels very "tight" in his left hip. The following portions of the patient's history were reviewed and updated as appropriate: allergies, current medications, past family history, past medical history, past social history, past surgical history, and problem list.    Review of Systems    Physical Exam:  Exam today reveals an elevated left versus right innominate there is a leg length inequality there is internal rotation of the left and ottoman on sacrum and parasacral tightness noted of the left SI joint with tenderness upon palpation. Active myofascial trigger points are present in the left gluteus medius as well as the left piriformis musculature. Joint dysfunction left SI L5-S1 L4-L5 motion units. Assessment:   Diagnosis ICD-10-CM Associated Orders   1. Chronic bilateral low back pain with left-sided sciatica  M54.42     G89.29       2. Neck pain  M54.2       3. Myofascial pain  M79.18                 Treatment: 72360  Manipulation to the left innominate, sacrum, L5 level via Parry drop maneuver well-tolerated by the patient today. Flexion distraction manipulation L4-L5 L5-S1 also well-tolerated. Therapeutic stretching in the office today concentrating on the musculature of the bilateral hips. We worked with internal and external rotation as well as anterior hip flexibility. Reviewed home stretches including the seated piriformis stretch. Joann Mercado has a good handle on this. There is a 12-minute procedure. Discussion:  See him back for follow-up next week.

## 2023-11-02 ENCOUNTER — PROCEDURE VISIT (OUTPATIENT)
Age: 63
End: 2023-11-02
Payer: COMMERCIAL

## 2023-11-02 VITALS
DIASTOLIC BLOOD PRESSURE: 90 MMHG | SYSTOLIC BLOOD PRESSURE: 142 MMHG | WEIGHT: 252 LBS | HEIGHT: 72 IN | BODY MASS INDEX: 34.13 KG/M2

## 2023-11-02 DIAGNOSIS — S20.212A RIB CONTUSION, LEFT, INITIAL ENCOUNTER: ICD-10-CM

## 2023-11-02 DIAGNOSIS — M54.2 NECK PAIN: ICD-10-CM

## 2023-11-02 DIAGNOSIS — G89.29 CHRONIC BILATERAL LOW BACK PAIN WITH LEFT-SIDED SCIATICA: Primary | ICD-10-CM

## 2023-11-02 DIAGNOSIS — M54.42 CHRONIC BILATERAL LOW BACK PAIN WITH LEFT-SIDED SCIATICA: Primary | ICD-10-CM

## 2023-11-02 DIAGNOSIS — M79.18 MYOFASCIAL PAIN: ICD-10-CM

## 2023-11-02 PROCEDURE — 98941 CHIROPRACT MANJ 3-4 REGIONS: CPT | Performed by: CHIROPRACTOR

## 2023-11-02 PROCEDURE — 97110 THERAPEUTIC EXERCISES: CPT | Performed by: CHIROPRACTOR

## 2023-11-02 NOTE — PROGRESS NOTES
HPI:  Nena Martin returns for treatment today ongoing low back pain and stiffness into the left lower extremity. Had a fall on Tuesday. Neck is stiff and tight today. VPAS  5-6      The following portions of the patient's history were reviewed and updated as appropriate: allergies, current medications, past family history, past medical history, past social history, past surgical history, and problem list.    Review of Systems    Physical Exam:  Exam today reveals an elevated left versus right innominate there is a leg length inequality there is internal rotation of the left and ottoman on sacrum and parasacral tightness noted of the left SI joint with tenderness upon palpation. Active myofascial trigger points are present in the left gluteus medius as well as the left piriformis musculature. Joint dysfunction left SI L5-S1 L4-L5 motion units. Assessment:   Diagnosis ICD-10-CM Associated Orders   1. Chronic bilateral low back pain with left-sided sciatica  M54.42     G89.29       2. Neck pain  M54.2       3. Myofascial pain  M79.18       4. Rib contusion, left, initial encounter  S20.212A XR ribs bilateral 4+ vw w pa chest                Treatment: 09705  Manipulation to the left innominate, sacrum, L5 level via Parry drop maneuver well-tolerated by the patient today. Flexion distraction manipulation L4-L5 L5-S1 also well-tolerated. Therapeutic stretching in the office today concentrating on the musculature of the bilateral hips. We worked with internal and external rotation as well as anterior hip flexibility. Reviewed home stretches including the seated piriformis stretch. Nena Martin has a good handle on this. There is a 12-minute procedure. Discussion:  See him back for follow-up next week. In light of rib contusion we will have him obtain rib cage x-rays reviewed those with him next visit.

## 2023-11-03 ENCOUNTER — HOSPITAL ENCOUNTER (OUTPATIENT)
Dept: RADIOLOGY | Facility: HOSPITAL | Age: 63
Discharge: HOME | End: 2023-11-03
Payer: COMMERCIAL

## 2023-11-03 DIAGNOSIS — S20.212A RIB CONTUSION, LEFT, INITIAL ENCOUNTER: ICD-10-CM

## 2023-11-03 PROCEDURE — 71111 X-RAY EXAM RIBS/CHEST4/> VWS: CPT

## 2023-11-10 ENCOUNTER — PROCEDURE VISIT (OUTPATIENT)
Age: 63
End: 2023-11-10
Payer: COMMERCIAL

## 2023-11-10 VITALS — BODY MASS INDEX: 34.13 KG/M2 | HEIGHT: 72 IN | WEIGHT: 252 LBS

## 2023-11-10 DIAGNOSIS — M79.18 MYOFASCIAL PAIN: ICD-10-CM

## 2023-11-10 DIAGNOSIS — M54.2 NECK PAIN: ICD-10-CM

## 2023-11-10 DIAGNOSIS — G89.29 CHRONIC BILATERAL LOW BACK PAIN WITH LEFT-SIDED SCIATICA: Primary | ICD-10-CM

## 2023-11-10 DIAGNOSIS — M54.42 CHRONIC BILATERAL LOW BACK PAIN WITH LEFT-SIDED SCIATICA: Primary | ICD-10-CM

## 2023-11-10 DIAGNOSIS — S20.212A RIB CONTUSION, LEFT, INITIAL ENCOUNTER: ICD-10-CM

## 2023-11-10 PROCEDURE — 97140 MANUAL THERAPY 1/> REGIONS: CPT | Performed by: CHIROPRACTOR

## 2023-11-10 PROCEDURE — 98941 CHIROPRACT MANJ 3-4 REGIONS: CPT | Performed by: CHIROPRACTOR

## 2023-11-10 NOTE — PROGRESS NOTES
HPI:  Mervin Seen returns for treatment today ongoing low back pain and stiffness into the left lower extremity. Had a fall on Tuesday. Neck is stiff and tight today. Rib cage pain resolving. VPAS  7-10      The following portions of the patient's history were reviewed and updated as appropriate: allergies, current medications, past family history, past medical history, past social history, past surgical history, and problem list.    Review of Systems    Physical Exam:  Exam today reveals an elevated left versus right innominate there is a leg length inequality there is internal rotation of the left and ottoman on sacrum and parasacral tightness noted of the left SI joint with tenderness upon palpation. Active myofascial trigger points are present in the left gluteus medius as well as the left piriformis musculature. Joint dysfunction left SI L5-S1 L4-L5 motion units. Examination of the cervical spine reveals a right-sided head tilt. Cervical range of motion reduced 50% in the left rotation and left lateral bending 25% right rotation. He is neurologically stable negative Spurling's maneuver. Facet joint dysfunction C1-C2 C5-C6. Active triggers left superior trapezius left rhomboid and left levator scapula. Assessment:   Diagnosis ICD-10-CM Associated Orders   1. Chronic bilateral low back pain with left-sided sciatica  M54.42     G89.29       2. Neck pain  M54.2       3. Myofascial pain  M79.18       4. Rib contusion, left, initial encounter  S20.212A                 Treatment: 54801  Manipulation to the left innominate, sacrum, L5 level via Parry drop maneuver well-tolerated by the patient today. Flexion distraction manipulation L4-L5 L5-S1 also well-tolerated. Manipulation C1 C5 via seated stairstepping technique. Therapeutic myofascial release and stretching utilizing Graston technique. I used GT to GT 3 and GT 4 instruments.   I then used extensive myofascial release and stretching techniques to the cervical thoracic region as well as left periscapular region. This was a 15-minute procedure and well-tolerated by the patient yielding increase cervical range of motion. Discussion:  See him back for follow-up next week.

## 2024-01-02 DIAGNOSIS — I21.11 ST ELEVATION (STEMI) MYOCARDIAL INFARCTION INVOLVING RIGHT CORONARY ARTERY (HCC): ICD-10-CM

## 2024-01-02 DIAGNOSIS — I25.10 ARTERIOSCLEROTIC HEART DISEASE: ICD-10-CM

## 2024-01-02 DIAGNOSIS — F33.0 MILD EPISODE OF RECURRENT MAJOR DEPRESSIVE DISORDER (HCC): ICD-10-CM

## 2024-01-03 RX ORDER — ROSUVASTATIN CALCIUM 20 MG/1
20 TABLET, COATED ORAL DAILY
Qty: 90 TABLET | Refills: 0 | Status: SHIPPED | OUTPATIENT
Start: 2024-01-03

## 2024-01-03 RX ORDER — DESVENLAFAXINE 100 MG/1
100 TABLET, EXTENDED RELEASE ORAL DAILY
Qty: 90 TABLET | Refills: 0 | Status: SHIPPED | OUTPATIENT
Start: 2024-01-03

## 2024-01-23 ENCOUNTER — TELEPHONE (OUTPATIENT)
Dept: GASTROENTEROLOGY | Facility: CLINIC | Age: 64
End: 2024-01-23

## 2024-01-23 ENCOUNTER — OFFICE VISIT (OUTPATIENT)
Dept: GASTROENTEROLOGY | Facility: CLINIC | Age: 64
End: 2024-01-23
Payer: COMMERCIAL

## 2024-01-23 ENCOUNTER — OFFICE VISIT (OUTPATIENT)
Dept: CARDIOLOGY CLINIC | Facility: CLINIC | Age: 64
End: 2024-01-23
Payer: COMMERCIAL

## 2024-01-23 VITALS
HEIGHT: 72 IN | BODY MASS INDEX: 34.81 KG/M2 | DIASTOLIC BLOOD PRESSURE: 70 MMHG | HEART RATE: 73 BPM | SYSTOLIC BLOOD PRESSURE: 130 MMHG | OXYGEN SATURATION: 98 % | WEIGHT: 257 LBS

## 2024-01-23 VITALS
HEART RATE: 73 BPM | DIASTOLIC BLOOD PRESSURE: 70 MMHG | HEIGHT: 72 IN | WEIGHT: 257.8 LBS | BODY MASS INDEX: 34.92 KG/M2 | SYSTOLIC BLOOD PRESSURE: 130 MMHG

## 2024-01-23 DIAGNOSIS — E78.2 MIXED HYPERLIPIDEMIA: ICD-10-CM

## 2024-01-23 DIAGNOSIS — I21.11 ST ELEVATION (STEMI) MYOCARDIAL INFARCTION INVOLVING RIGHT CORONARY ARTERY (HCC): ICD-10-CM

## 2024-01-23 DIAGNOSIS — N52.9 VASCULOGENIC ERECTILE DYSFUNCTION, UNSPECIFIED VASCULOGENIC ERECTILE DYSFUNCTION TYPE: ICD-10-CM

## 2024-01-23 DIAGNOSIS — J39.2 THROAT IRRITATION: ICD-10-CM

## 2024-01-23 DIAGNOSIS — Z12.11 SCREEN FOR COLON CANCER: ICD-10-CM

## 2024-01-23 DIAGNOSIS — I25.10 ARTERIOSCLEROTIC HEART DISEASE: Primary | ICD-10-CM

## 2024-01-23 DIAGNOSIS — R13.10 DYSPHAGIA, UNSPECIFIED TYPE: Primary | ICD-10-CM

## 2024-01-23 PROCEDURE — 99244 OFF/OP CNSLTJ NEW/EST MOD 40: CPT | Performed by: INTERNAL MEDICINE

## 2024-01-23 PROCEDURE — 99214 OFFICE O/P EST MOD 30 MIN: CPT | Performed by: INTERNAL MEDICINE

## 2024-01-23 PROCEDURE — 93000 ELECTROCARDIOGRAM COMPLETE: CPT | Performed by: INTERNAL MEDICINE

## 2024-01-23 RX ORDER — PANTOPRAZOLE SODIUM 40 MG/1
40 TABLET, DELAYED RELEASE ORAL DAILY
Qty: 30 TABLET | Refills: 1 | Status: SHIPPED | OUTPATIENT
Start: 2024-01-23

## 2024-01-23 RX ORDER — TADALAFIL 20 MG/1
20 TABLET ORAL DAILY PRN
Qty: 10 TABLET | Refills: 1 | Status: SHIPPED | OUTPATIENT
Start: 2024-01-23

## 2024-01-23 NOTE — PROGRESS NOTES
Cardiology   Tony Rayo DO, [facility]  Kev Simpson MD, [facility]  Gavin Bolivar MD, [facility]  Merlin Rodriguez MD, [facility]  -------------------------------------------------------------------  [facility]  [address], [address], [address]  [place], [place], [address]  [phone]    Cardiology Follow Up  Kee Bardales III  1960  [id number]          Assessment/Plan:    1. Arteriosclerotic heart disease    2. Mixed hyperlipidemia    3. ST elevation (STEMI) myocardial infarction involving right coronary artery (HCC)      - He is feeling well without any symptoms - continue current Rx.  - Continue ASA  - Continue Crestor - lipid panel ordered   - BP goal is < 130/80 - at goal today  - Discussed diet and exercise in detail    Interval History:     Kee Bardales III is 63 y.o. male here for followup of CAD.  Since his last visit, he has been feeling well.  he denies any palpitations, chest pain, shortness of breath, LE edema, orthopnea or PND.   Diet is overall unchanged.  There has not been a significant change in weight.  He had epidural injection and had improvement in back pain.  He has been having problems with balance.    His last blood work done on 6/12/23 - LDL was 45 mg/dL.  Renal and liver function were normal.      Currently he is using rosuvastatin 20 mg daily and aspirin 81 mg daily.    The patient has a history of CAD.  In April of 2016, he underwent PCI to a 99% mRCA lesion after a STEMI. He was driving and felt tightness in the center part of his chest associated with diaphoresis.       Past Medical History:   Diagnosis Date    Coronary artery disease     Depression     Dermoid cyst of face     resolved 04/19/2016    Hyperlipidemia     Myocardial infarction (HCC) 04/15/2016    99% RCA block    Presenile dementia, depressed type (HCC)     onst 11/03/2009, resolved 07/21/2016    Psychiatric disorder     depression     Social History     Socioeconomic  History    Marital status: /Civil Union     Spouse name: Not on file    Number of children: Not on file    Years of education: Not on file    Highest education level: Not on file   Occupational History    Not on file   Tobacco Use    Smoking status: Former     Current packs/day: 0.00     Average packs/day: 0.5 packs/day for 15.3 years (7.6 ttl pk-yrs)     Types: Cigarettes     Start date:      Quit date: 4/15/2016     Years since quittin.7    Smokeless tobacco: Never   Vaping Use    Vaping status: Never Used   Substance and Sexual Activity    Alcohol use: Not Currently     Comment: 2-3 beers every cpl days    Drug use: Yes     Frequency: 2.0 times per week     Types: Marijuana     Comment: daily     Sexual activity: Not on file   Other Topics Concern    Not on file   Social History Narrative    Lack of exercise    Pets/animals: Dog    Sleeps 8-10 hours a day             Social Determinants of Health     Financial Resource Strain: Not on file   Food Insecurity: Not on file   Transportation Needs: Not on file   Physical Activity: Not on file   Stress: Not on file   Social Connections: Not on file   Intimate Partner Violence: Not on file   Housing Stability: Not on file      Family History   Problem Relation Age of Onset    Arthritis Mother     Hypertension Mother     Hypertension Father     Heart disease Father     Coronary artery disease Father     Depression Father     Hypertension Brother     Cancer Maternal Grandfather      Past Surgical History:   Procedure Laterality Date    APPENDECTOMY      COLONOSCOPY N/A 2016    Procedure: COLONOSCOPY;  Surgeon: Norberto Argueta MD;  Location: [facility] GI LAB;  Service:     CORONARY ANGIOPLASTY WITH STENT PLACEMENT Right 04/15/2016    EPIDURAL BLOCK INJECTION N/A 3/31/2023    Procedure: L5 S1 LUMBAR epidural steroid injection (54766);  Surgeon: Saroj Rose DO;  Location: [facility] MAIN OR;  Service: Pain Management     ESOPHAGOGASTRODUODENOSCOPY N/A  12/14/2016    Procedure: ESOPHAGOGASTRODUODENOSCOPY (EGD);  Surgeon: Norberto Argueta MD;  Location: [facility] GI LAB;  Service:     LUMBAR EPIDURAL INJECTION N/A 6/14/2023    Procedure: L5 S1  LUMBAR epidural steroid injection (42681);  Surgeon: Saroj Rose DO;  Location: [facility] MAIN OR;  Service: Pain Management        Current Outpatient Medications:     aspirin 81 mg chewable tablet, Chew 81 mg daily, Disp: , Rfl:     desvenlafaxine (PRISTIQ) 100 mg 24 hr tablet, Take 1 tablet (100 mg total) by mouth daily, Disp: 90 tablet, Rfl: 0    rosuvastatin (CRESTOR) 20 MG tablet, Take 1 tablet (20 mg total) by mouth daily, Disp: 90 tablet, Rfl: 0        Review of Systems:  Review of Systems   Respiratory:  Negative for shortness of breath.    Cardiovascular:  Negative for chest pain, palpitations and leg swelling.   Musculoskeletal:  Positive for arthralgias and back pain.   All other systems reviewed and are negative.        Physical Exam:  Vitals:  Vitals:    01/23/24 0923   BP: 130/70   BP Location: Right arm   Patient Position: Sitting   Cuff Size: Large   Pulse: 73   SpO2: 98%   Weight: 117 kg (257 lb)   Height: 6' (1.829 m)     Physical Exam   Constitutional: He appears healthy. No distress.   Eyes: Pupils are equal, round, and reactive to light. Conjunctivae are normal.   Neck: No JVD present.   Cardiovascular: Normal rate, regular rhythm and normal heart sounds. Exam reveals no gallop and no friction rub.   No murmur heard.  Pulmonary/Chest: Effort normal and breath sounds normal. He has no wheezes. He has no rales.   Musculoskeletal:         General: No tenderness, deformity or edema.      Cervical back: Normal range of motion and neck supple.   Neurological: He is alert and oriented to person, place, and time.   Skin: Skin is warm and dry.        Cardiographics:  EKG: Personally reviewed NSR at 70 bpm  Last known EF: 60% (2017)    This note was completed in part utilizing M-Modal Fluency Direct Software.   Grammatical errors, random word insertions, spelling mistakes, and incomplete sentences can be an occasional consequence of this system secondary to software limitations, ambient noise, and hardware issues.  If you have any questions or concerns about the content, text, or information contained within the body of this dictation, please contact the provider for clarification.

## 2024-01-23 NOTE — H&P (VIEW-ONLY)
[facility]ultation -  [facility]  [facility] 1960 [facility]         ASSESSMENT & PLAN:    Throat irritation  Unclear if the symptoms are due to dry throat.  Patient reports having feeling of something questionable mucus with choking episodes.  Rule out from silent acid reflux.  Patient had history of chronic acid reflux before.    -Patient was explained about the lifestyle and dietary modifications.  Advised to avoid fatty foods, chocolates, caffeine, alcohol and any other triggering foods.  Avoid eating for at least 3 hours before going to bed.    -Will give a trial with pantoprazole    -Schedule EGD        Dysphagia  Patient reports having occasional difficulty swallowing and some choking in the throat area.  Rule out peptic stricture    -Advised to chew well before swallowing    -Schedule EGD    Screen for colon cancer  Screening for colon cancer - patient is at average risk for colon cancer screening.  Rule out colorectal lesions including polyps or malignancy.    -Schedule for colonoscopy.    -High-fiber diet.    -Patient was given instructions about the colonoscopy prep.    -Patient was explained about the risks and benefits of the procedure. Risks including but not limited to bleeding, infection, perforation were explained in detail. Also explained about less than 100% sensitivity with the exam and other alternatives.      Chief Complaint: Throat irritation and choking    HPI: 63-year-old male with history of hyperlipidemia, depression reports having issues with swallowing in the throat.  He reports having dry throat with mucus sometimes and have choking episodes. He is actually pointing out more in the hard palate area..  Denies any postnasal drip or sinus problems.  Feels like food goes down slow.  He gives history of acid reflux for a long time but has been doing well in the past few years and not using any medication.  Good appetite, no recent weight loss.  Denies any abdominal  pain, nausea or vomiting.  Regular bowel movements and denies any blood or mucus in the stool.    Colonoscopy was about 8 to 9 years ago    Chaperon: Ms. Yadav    REVIEW OF SYSTEMS: Review of Systems   Constitutional:  Negative for activity change, appetite change, chills, diaphoresis, fatigue, fever and unexpected weight change.   HENT:  Negative for ear discharge, ear pain, facial swelling, hearing loss, nosebleeds, sore throat, tinnitus and voice change.    Eyes:  Negative for pain, discharge, redness, itching and visual disturbance.   Respiratory:  Negative for apnea, cough, chest tightness, shortness of breath and wheezing.    Cardiovascular:  Negative for chest pain and palpitations.   Gastrointestinal:         As noted in HPI   Endocrine: Negative for cold intolerance, heat intolerance and polyuria.   Genitourinary:  Negative for difficulty urinating, dysuria, flank pain, hematuria and urgency.   Musculoskeletal:  Negative for arthralgias, back pain, gait problem, joint swelling and myalgias.   Skin:  Negative for rash and wound.   Neurological:  Negative for dizziness, tremors, seizures, speech difficulty, light-headedness, numbness and headaches.   Hematological:  Negative for adenopathy. Does not bruise/bleed easily.   Psychiatric/Behavioral:  Negative for agitation, behavioral problems and confusion. The patient is not nervous/anxious.         Past Medical History:   Diagnosis Date    Coronary artery disease     Depression     Dermoid cyst of face     resolved 04/19/2016    Hyperlipidemia     Myocardial infarction (HCC) 04/15/2016    99% RCA block    Presenile dementia, depressed type (HCC)     onst 11/03/2009, resolved 07/21/2016    Psychiatric disorder     depression      Past Surgical History:   Procedure Laterality Date    APPENDECTOMY      COLONOSCOPY N/A 12/14/2016    Procedure: COLONOSCOPY;  Surgeon: Norberto Argueta MD;  Location: [facility] GI LAB;  Service:     CORONARY ANGIOPLASTY WITH STENT  PLACEMENT Right 04/15/2016    EPIDURAL BLOCK INJECTION N/A 2023    Procedure: L5 S1 LUMBAR epidural steroid injection ([id number]);  Surgeon: Saroj Rose DO;  Location: [facility] MAIN OR;  Service: Pain Management     ESOPHAGOGASTRODUODENOSCOPY N/A 2016    Procedure: ESOPHAGOGASTRODUODENOSCOPY (EGD);  Surgeon: Norberto Argueta MD;  Location: [facility] GI LAB;  Service:     LUMBAR EPIDURAL INJECTION N/A 2023    Procedure: L5 S1  LUMBAR epidural steroid injection ([id number]);  Surgeon: Saroj Rose DO;  Location: [facility] MAIN OR;  Service: Pain Management     UPPER GASTROINTESTINAL ENDOSCOPY       Social History     Socioeconomic History    Marital status: /Civil Union     Spouse name: Not on file    Number of children: Not on file    Years of education: Not on file    Highest education level: Not on file   Occupational History    Not on file   Tobacco Use    Smoking status: Former     Current packs/day: 0.00     Average packs/day: 0.5 packs/day for 15.3 years (7.6 ttl pk-yrs)     Types: Cigarettes     Start date:      Quit date: 4/15/2016     Years since quittin.7    Smokeless tobacco: Never   Vaping Use    Vaping status: Never Used   Substance and Sexual Activity    Alcohol use: Yes     Comment: 2-3 beers a week per pt 24    Drug use: Yes     Frequency: 2.0 times per week     Types: Marijuana     Comment: daily     Sexual activity: Not on file   Other Topics Concern    Not on file   Social History Narrative    Lack of exercise    Pets/animals: Dog    Sleeps 8-10 hours a day             Social Determinants of Health     Financial Resource Strain: Not on file   Food Insecurity: Not on file   Transportation Needs: Not on file   Physical Activity: Not on file   Stress: Not on file   Social Connections: Not on file   Intimate Partner Violence: Not on file   Housing Stability: Not on file     Family History   Problem Relation Age of Onset    Arthritis Mother     Hypertension Mother      Hypertension Father     Heart disease Father     Coronary artery disease Father     Depression Father     Hypertension Brother     Cancer Maternal Grandfather      Amoxicillin and Lipitor [atorvastatin]  Current Outpatient Medications   Medication Sig Dispense Refill    aspirin 81 mg chewable tablet Chew 81 mg daily      desvenlafaxine (PRISTIQ) 100 mg 24 hr tablet Take 1 tablet (100 mg total) by mouth daily 90 tablet 0    Multiple Vitamins-Minerals (MULTIVITAMIN ADULTS 50+ PO) Take by mouth      pantoprazole (PROTONIX) 40 mg tablet Take 1 tablet (40 mg total) by mouth daily 30 tablet 1    rosuvastatin (CRESTOR) 20 MG tablet Take 1 tablet (20 mg total) by mouth daily 90 tablet 0    Saw Palmetto, Serenoa repens, (SAW PALMETTO PO) Take by mouth Unsure of dosage      tadalafil (CIALIS) 20 MG tablet Take 1 tablet (20 mg total) by mouth daily as needed for erectile dysfunction 10 tablet 1     No current facility-administered medications for this visit.       Blood pressure 130/70, pulse 73, height 6' (1.829 m), weight 117 kg (257 lb 12.8 oz).    PHYSICAL EXAM: Physical Exam  Constitutional:       Appearance: He is well-developed.   HENT:      Head: Normocephalic and atraumatic.   Eyes:      General: No scleral icterus.        Right eye: No discharge.         Left eye: No discharge.      Conjunctiva/sclera: Conjunctivae normal.      Pupils: Pupils are equal, round, and reactive to light.   Neck:      Thyroid: No thyromegaly.      Vascular: No JVD.      Trachea: No tracheal deviation.   Cardiovascular:      Rate and Rhythm: Normal rate and regular rhythm.      Heart sounds: Normal heart sounds. No murmur heard.     No friction rub. No gallop.   Pulmonary:      Effort: Pulmonary effort is normal. No respiratory distress.      Breath sounds: Normal breath sounds. No wheezing or rales.   Chest:      Chest wall: No tenderness.   Abdominal:      General: Bowel sounds are normal. There is no distension.      Palpations: Abdomen  is soft. There is no mass.      Tenderness: There is no abdominal tenderness. There is no guarding or rebound.      Hernia: No hernia is present.   Musculoskeletal:      Cervical back: Neck supple.   Lymphadenopathy:      Cervical: No cervical adenopathy.   Skin:     General: Skin is warm and dry.      Findings: No erythema or rash.   Neurological:      Mental Status: He is alert and oriented to person, place, and time.   Psychiatric:         Behavior: Behavior normal.         Thought Content: Thought content normal.          Lab Results   Component Value Date    WBC 4.91 06/12/2023    HGB 13.7 06/12/2023    HCT 41.8 06/12/2023    MCV 95 06/12/2023     06/12/2023     Lab Results   Component Value Date    CALCIUM 8.8 06/12/2023    K 3.6 06/12/2023    CO2 29 06/12/2023     (H) 06/12/2023    BUN 15 06/12/2023    CREATININE 1.04 06/12/2023     Lab Results   Component Value Date    ALT 32 06/12/2023    AST 14 06/12/2023    ALKPHOS 81 06/12/2023     Lab Results   Component Value Date    INR 1.07 02/18/2021    INR 1.00 11/13/2017    INR 1.06 06/05/2017    PROTIME 14.0 02/18/2021    PROTIME 13.5 11/13/2017    PROTIME 13.8 06/05/2017       XR ribs bilateral 4+ vw w pa chest    Result Date: 11/6/2023  Impression:  No acute rib fracture identified. No pleural effusion. No pneumothorax. Lungs are clear. Cardiac silhouette is normal in size. No obvious lytic or blastic bony lesions are seen. Electronically signed: 11/06/2023 09:32 AM Mina Diaz MD

## 2024-01-23 NOTE — ASSESSMENT & PLAN NOTE
Unclear if the symptoms are due to dry throat.  Patient reports having feeling of something questionable mucus with choking episodes.  Rule out from silent acid reflux.  Patient had history of chronic acid reflux before.    -Patient was explained about the lifestyle and dietary modifications.  Advised to avoid fatty foods, chocolates, caffeine, alcohol and any other triggering foods.  Avoid eating for at least 3 hours before going to bed.    -Will give a trial with pantoprazole    -Schedule EGD

## 2024-01-23 NOTE — TELEPHONE ENCOUNTER
Scheduled date of EGD/colonoscopy (as of today):02/21/24  Physician performing EGD/colonoscopy:Dr. Argueta  Location of EGD/colonoscopy:[facility]  Desired bowel prep reviewed with patient:Renuka Castellanos  Instructions reviewed with patient by:galen  Clearances:  n/a

## 2024-01-23 NOTE — PROGRESS NOTES
Consultation -  [facility]  [facility] 1960 [facility]         ASSESSMENT & PLAN:    Throat irritation  Unclear if the symptoms are due to dry throat.  Patient reports having feeling of something questionable mucus with choking episodes.  Rule out from silent acid reflux.  Patient had history of chronic acid reflux before.    -Patient was explained about the lifestyle and dietary modifications.  Advised to avoid fatty foods, chocolates, caffeine, alcohol and any other triggering foods.  Avoid eating for at least 3 hours before going to bed.    -Will give a trial with pantoprazole    -Schedule EGD        Dysphagia  Patient reports having occasional difficulty swallowing and some choking in the throat area.  Rule out peptic stricture    -Advised to chew well before swallowing    -Schedule EGD    Screen for colon cancer  Screening for colon cancer - patient is at average risk for colon cancer screening.  Rule out colorectal lesions including polyps or malignancy.    -Schedule for colonoscopy.    -High-fiber diet.    -Patient was given instructions about the colonoscopy prep.    -Patient was explained about the risks and benefits of the procedure. Risks including but not limited to bleeding, infection, perforation were explained in detail. Also explained about less than 100% sensitivity with the exam and other alternatives.      Chief Complaint: Throat irritation and choking    HPI: 63-year-old male with history of hyperlipidemia, depression reports having issues with swallowing in the throat.  He reports having dry throat with mucus sometimes and have choking episodes. He is actually pointing out more in the hard palate area..  Denies any postnasal drip or sinus problems.  Feels like food goes down slow.  He gives history of acid reflux for a long time but has been doing well in the past few years and not using any medication.  Good appetite, no recent weight loss.  Denies any abdominal  pain, nausea or vomiting.  Regular bowel movements and denies any blood or mucus in the stool.    Colonoscopy was about 8 to 9 years ago    Chaperon: Ms. Yadav    REVIEW OF SYSTEMS: Review of Systems   Constitutional:  Negative for activity change, appetite change, chills, diaphoresis, fatigue, fever and unexpected weight change.   HENT:  Negative for ear discharge, ear pain, facial swelling, hearing loss, nosebleeds, sore throat, tinnitus and voice change.    Eyes:  Negative for pain, discharge, redness, itching and visual disturbance.   Respiratory:  Negative for apnea, cough, chest tightness, shortness of breath and wheezing.    Cardiovascular:  Negative for chest pain and palpitations.   Gastrointestinal:         As noted in HPI   Endocrine: Negative for cold intolerance, heat intolerance and polyuria.   Genitourinary:  Negative for difficulty urinating, dysuria, flank pain, hematuria and urgency.   Musculoskeletal:  Negative for arthralgias, back pain, gait problem, joint swelling and myalgias.   Skin:  Negative for rash and wound.   Neurological:  Negative for dizziness, tremors, seizures, speech difficulty, light-headedness, numbness and headaches.   Hematological:  Negative for adenopathy. Does not bruise/bleed easily.   Psychiatric/Behavioral:  Negative for agitation, behavioral problems and confusion. The patient is not nervous/anxious.         Past Medical History:   Diagnosis Date    Coronary artery disease     Depression     Dermoid cyst of face     resolved 04/19/2016    Hyperlipidemia     Myocardial infarction (HCC) 04/15/2016    99% RCA block    Presenile dementia, depressed type (HCC)     onst 11/03/2009, resolved 07/21/2016    Psychiatric disorder     depression      Past Surgical History:   Procedure Laterality Date    APPENDECTOMY      COLONOSCOPY N/A 12/14/2016    Procedure: COLONOSCOPY;  Surgeon: Norberto Argueta MD;  Location: [facility] GI LAB;  Service:     CORONARY ANGIOPLASTY WITH STENT  PLACEMENT Right 04/15/2016    EPIDURAL BLOCK INJECTION N/A 2023    Procedure: L5 S1 LUMBAR epidural steroid injection ([id number]);  Surgeon: Saroj Rose DO;  Location: [facility] MAIN OR;  Service: Pain Management     ESOPHAGOGASTRODUODENOSCOPY N/A 2016    Procedure: ESOPHAGOGASTRODUODENOSCOPY (EGD);  Surgeon: Norberto Argueta MD;  Location: [facility] GI LAB;  Service:     LUMBAR EPIDURAL INJECTION N/A 2023    Procedure: L5 S1  LUMBAR epidural steroid injection ([id number]);  Surgeon: Saroj Rose DO;  Location: [facility] MAIN OR;  Service: Pain Management     UPPER GASTROINTESTINAL ENDOSCOPY       Social History     Socioeconomic History    Marital status: /Civil Union     Spouse name: Not on file    Number of children: Not on file    Years of education: Not on file    Highest education level: Not on file   Occupational History    Not on file   Tobacco Use    Smoking status: Former     Current packs/day: 0.00     Average packs/day: 0.5 packs/day for 15.3 years (7.6 ttl pk-yrs)     Types: Cigarettes     Start date:      Quit date: 4/15/2016     Years since quittin.7    Smokeless tobacco: Never   Vaping Use    Vaping status: Never Used   Substance and Sexual Activity    Alcohol use: Yes     Comment: 2-3 beers a week per pt 24    Drug use: Yes     Frequency: 2.0 times per week     Types: Marijuana     Comment: daily     Sexual activity: Not on file   Other Topics Concern    Not on file   Social History Narrative    Lack of exercise    Pets/animals: Dog    Sleeps 8-10 hours a day             Social Determinants of Health     Financial Resource Strain: Not on file   Food Insecurity: Not on file   Transportation Needs: Not on file   Physical Activity: Not on file   Stress: Not on file   Social Connections: Not on file   Intimate Partner Violence: Not on file   Housing Stability: Not on file     Family History   Problem Relation Age of Onset    Arthritis Mother     Hypertension Mother      Hypertension Father     Heart disease Father     Coronary artery disease Father     Depression Father     Hypertension Brother     Cancer Maternal Grandfather      Amoxicillin and Lipitor [atorvastatin]  Current Outpatient Medications   Medication Sig Dispense Refill    aspirin 81 mg chewable tablet Chew 81 mg daily      desvenlafaxine (PRISTIQ) 100 mg 24 hr tablet Take 1 tablet (100 mg total) by mouth daily 90 tablet 0    Multiple Vitamins-Minerals (MULTIVITAMIN ADULTS 50+ PO) Take by mouth      pantoprazole (PROTONIX) 40 mg tablet Take 1 tablet (40 mg total) by mouth daily 30 tablet 1    rosuvastatin (CRESTOR) 20 MG tablet Take 1 tablet (20 mg total) by mouth daily 90 tablet 0    Saw Palmetto, Serenoa repens, (SAW PALMETTO PO) Take by mouth Unsure of dosage      tadalafil (CIALIS) 20 MG tablet Take 1 tablet (20 mg total) by mouth daily as needed for erectile dysfunction 10 tablet 1     No current facility-administered medications for this visit.       Blood pressure 130/70, pulse 73, height 6' (1.829 m), weight 117 kg (257 lb 12.8 oz).    PHYSICAL EXAM: Physical Exam  Constitutional:       Appearance: He is well-developed.   HENT:      Head: Normocephalic and atraumatic.   Eyes:      General: No scleral icterus.        Right eye: No discharge.         Left eye: No discharge.      Conjunctiva/sclera: Conjunctivae normal.      Pupils: Pupils are equal, round, and reactive to light.   Neck:      Thyroid: No thyromegaly.      Vascular: No JVD.      Trachea: No tracheal deviation.   Cardiovascular:      Rate and Rhythm: Normal rate and regular rhythm.      Heart sounds: Normal heart sounds. No murmur heard.     No friction rub. No gallop.   Pulmonary:      Effort: Pulmonary effort is normal. No respiratory distress.      Breath sounds: Normal breath sounds. No wheezing or rales.   Chest:      Chest wall: No tenderness.   Abdominal:      General: Bowel sounds are normal. There is no distension.      Palpations: Abdomen  is soft. There is no mass.      Tenderness: There is no abdominal tenderness. There is no guarding or rebound.      Hernia: No hernia is present.   Musculoskeletal:      Cervical back: Neck supple.   Lymphadenopathy:      Cervical: No cervical adenopathy.   Skin:     General: Skin is warm and dry.      Findings: No erythema or rash.   Neurological:      Mental Status: He is alert and oriented to person, place, and time.   Psychiatric:         Behavior: Behavior normal.         Thought Content: Thought content normal.          Lab Results   Component Value Date    WBC 4.91 06/12/2023    HGB 13.7 06/12/2023    HCT 41.8 06/12/2023    MCV 95 06/12/2023     06/12/2023     Lab Results   Component Value Date    CALCIUM 8.8 06/12/2023    K 3.6 06/12/2023    CO2 29 06/12/2023     (H) 06/12/2023    BUN 15 06/12/2023    CREATININE 1.04 06/12/2023     Lab Results   Component Value Date    ALT 32 06/12/2023    AST 14 06/12/2023    ALKPHOS 81 06/12/2023     Lab Results   Component Value Date    INR 1.07 02/18/2021    INR 1.00 11/13/2017    INR 1.06 06/05/2017    PROTIME 14.0 02/18/2021    PROTIME 13.5 11/13/2017    PROTIME 13.8 06/05/2017       XR ribs bilateral 4+ vw w pa chest    Result Date: 11/6/2023  Impression:  No acute rib fracture identified. No pleural effusion. No pneumothorax. Lungs are clear. Cardiac silhouette is normal in size. No obvious lytic or blastic bony lesions are seen. Electronically signed: 11/06/2023 09:32 AM Mina Diaz MD

## 2024-01-23 NOTE — ASSESSMENT & PLAN NOTE
Patient reports having occasional difficulty swallowing and some choking in the throat area.  Rule out peptic stricture    -Advised to chew well before swallowing    -Schedule EGD

## 2024-02-07 ENCOUNTER — ANESTHESIA EVENT (OUTPATIENT)
Dept: ANESTHESIOLOGY | Facility: HOSPITAL | Age: 64
End: 2024-02-07

## 2024-02-07 ENCOUNTER — ANESTHESIA (OUTPATIENT)
Dept: ANESTHESIOLOGY | Facility: HOSPITAL | Age: 64
End: 2024-02-07

## 2024-02-21 ENCOUNTER — ANESTHESIA (OUTPATIENT)
Dept: GASTROENTEROLOGY | Facility: AMBULARY SURGERY CENTER | Age: 64
End: 2024-02-21

## 2024-02-21 ENCOUNTER — ANESTHESIA EVENT (OUTPATIENT)
Dept: GASTROENTEROLOGY | Facility: AMBULARY SURGERY CENTER | Age: 64
End: 2024-02-21

## 2024-02-21 ENCOUNTER — HOSPITAL ENCOUNTER (OUTPATIENT)
Dept: GASTROENTEROLOGY | Facility: AMBULARY SURGERY CENTER | Age: 64
Setting detail: OUTPATIENT SURGERY
Discharge: HOME | End: 2024-02-21
Attending: INTERNAL MEDICINE
Payer: COMMERCIAL

## 2024-02-21 VITALS
HEART RATE: 60 BPM | OXYGEN SATURATION: 99 % | DIASTOLIC BLOOD PRESSURE: 78 MMHG | RESPIRATION RATE: 18 BRPM | SYSTOLIC BLOOD PRESSURE: 118 MMHG | TEMPERATURE: 98.2 F

## 2024-02-21 DIAGNOSIS — Z12.11 SCREEN FOR COLON CANCER: ICD-10-CM

## 2024-02-21 DIAGNOSIS — R13.10 DYSPHAGIA, UNSPECIFIED TYPE: ICD-10-CM

## 2024-02-21 PROCEDURE — 43239 EGD BIOPSY SINGLE/MULTIPLE: CPT | Performed by: INTERNAL MEDICINE

## 2024-02-21 PROCEDURE — G0121 COLON CA SCRN NOT HI RSK IND: HCPCS | Performed by: INTERNAL MEDICINE

## 2024-02-21 PROCEDURE — 88305 TISSUE EXAM BY PATHOLOGIST: CPT | Performed by: PATHOLOGY

## 2024-02-21 RX ORDER — PROPOFOL 10 MG/ML
INJECTION, EMULSION INTRAVENOUS CONTINUOUS PRN
Status: DISCONTINUED | OUTPATIENT
Start: 2024-02-21 | End: 2024-02-21

## 2024-02-21 RX ORDER — SODIUM CHLORIDE, SODIUM LACTATE, POTASSIUM CHLORIDE, CALCIUM CHLORIDE 600; 310; 30; 20 MG/100ML; MG/100ML; MG/100ML; MG/100ML
INJECTION, SOLUTION INTRAVENOUS CONTINUOUS PRN
Status: DISCONTINUED | OUTPATIENT
Start: 2024-02-21 | End: 2024-02-21

## 2024-02-21 RX ORDER — LIDOCAINE HYDROCHLORIDE 10 MG/ML
INJECTION, SOLUTION EPIDURAL; INFILTRATION; INTRACAUDAL; PERINEURAL AS NEEDED
Status: DISCONTINUED | OUTPATIENT
Start: 2024-02-21 | End: 2024-02-21

## 2024-02-21 RX ORDER — PROPOFOL 10 MG/ML
INJECTION, EMULSION INTRAVENOUS AS NEEDED
Status: DISCONTINUED | OUTPATIENT
Start: 2024-02-21 | End: 2024-02-21

## 2024-02-21 RX ADMIN — PROPOFOL 200 MG: 10 INJECTION, EMULSION INTRAVENOUS at 08:49

## 2024-02-21 RX ADMIN — PROPOFOL 200 MCG/KG/MIN: 10 INJECTION, EMULSION INTRAVENOUS at 08:54

## 2024-02-21 RX ADMIN — PROPOFOL 50 MG: 10 INJECTION, EMULSION INTRAVENOUS at 08:52

## 2024-02-21 RX ADMIN — PROPOFOL 50 MG: 10 INJECTION, EMULSION INTRAVENOUS at 08:51

## 2024-02-21 RX ADMIN — PROPOFOL 20 MG: 10 INJECTION, EMULSION INTRAVENOUS at 09:01

## 2024-02-21 RX ADMIN — PROPOFOL 100 MG: 10 INJECTION, EMULSION INTRAVENOUS at 08:50

## 2024-02-21 RX ADMIN — PROPOFOL 30 MG: 10 INJECTION, EMULSION INTRAVENOUS at 08:58

## 2024-02-21 RX ADMIN — LIDOCAINE HYDROCHLORIDE 50 MG: 10 INJECTION, SOLUTION EPIDURAL; INFILTRATION; INTRACAUDAL at 08:49

## 2024-02-21 RX ADMIN — SODIUM CHLORIDE, SODIUM LACTATE, POTASSIUM CHLORIDE, AND CALCIUM CHLORIDE: .6; .31; .03; .02 INJECTION, SOLUTION INTRAVENOUS at 08:34

## 2024-02-21 NOTE — ANESTHESIA POSTPROCEDURE EVALUATION
Post-Op Assessment Note    CV Status:  Stable  Pain Score: 0    Pain management: adequate       Mental Status:  Sleepy   Hydration Status:  Stable   PONV Controlled:  None   Airway Patency:  Patent  Two or more mitigation strategies used for obstructive sleep apnea   Post Op Vitals Reviewed: Yes    No anethesia notable event occurred.    Staff: CRNA               BP   95/57   Temp 97   Pulse 59   Resp 16   SpO2 100

## 2024-02-21 NOTE — INTERVAL H&P NOTE
H&P reviewed. After examining the patient I find no changes in the patients condition since the H&P had been written.    Vitals:    02/21/24 0757   BP: 127/73   Pulse: 68   Resp: 18   Temp: 98.2 °F (36.8 °C)   SpO2: 98%

## 2024-02-21 NOTE — ANESTHESIA PREPROCEDURE EVALUATION
Procedure:  COLONOSCOPY  EGD    Relevant Problems   CARDIO   (+) Arteriosclerotic heart disease   (+) Mixed hyperlipidemia      GI/HEPATIC   (+) Dysphagia      MUSCULOSKELETAL   (+) Gout   (+) Lumbar spondylosis      NEURO/PSYCH   (+) Anxiety   (+) Mild episode of recurrent major depressive disorder (HCC)        Physical Exam    Airway    Mallampati score: II  TM Distance: >3 FB  Neck ROM: full     Dental   No notable dental hx     Cardiovascular  Cardiovascular exam normal    Pulmonary  Pulmonary exam normal     Other Findings        Anesthesia Plan  ASA Score- 2     Anesthesia Type- IV sedation with anesthesia with ASA Monitors.         Additional Monitors:     Airway Plan:            Plan Factors-Exercise tolerance (METS): >4 METS.    Chart reviewed.   Existing labs reviewed. Patient summary reviewed.    Patient is not a current smoker.      Obstructive sleep apnea risk education given perioperatively.        Induction-     Postoperative Plan-     Informed Consent- Anesthetic plan and risks discussed with patient.  I personally reviewed this patient with the CRNA. Discussed and agreed on the Anesthesia Plan with the CRNA..

## 2024-02-23 PROCEDURE — 88305 TISSUE EXAM BY PATHOLOGIST: CPT | Performed by: PATHOLOGY

## 2024-02-23 NOTE — PROGRESS NOTES
Office Note - Neurosurgery   Eloina Moore III 63 y o  male MRN: [id number]      Assessment:    Patient is gradually worsening  63-year-old gentleman with left lumbar radiculopathy secondary to spondylosis at L4-5 and L5-S1  He does have some hip weakness on the left which does not seem to be radicular and is more consistent with soft tissue injury  He may wish to discuss an MRI of the left hip with his PCP  I also asked him to discuss a course of membrane stabilizing agents with his PCP  We will refer him to pain management to discuss epidural steroid injection to try to improve the pain in the left leg  Briefly discussed L4-S1 decompression and fusion procedure given the extent of foraminal stenosis on the left at L4-5 and more impressively at L5-S1  I explained that the surgery would aim to improve his left leg pain and numbness but is less likely to improve the weakness in his left hip  There are some discussion, he indicated that he would prefer to exhaust nonsurgical pain management strategies  Reviewed the signs and symptoms of progressive lumbar radiculopathy and asked him to contact this office any concerns arise  Otherwise, he will follow-up on a as needed basis  History, physical examination and diagnostic tests were reviewed and questions answered  Diagnosis, care plan and treatment options were discussed  The patient understand instructions and will follow up as directed  Plan:    Follow-up: prn    Problem List Items Addressed This Visit        Musculoskeletal and Integument    Lumbar spondylosis    Relevant Orders    Ambulatory referral to Pain Management       Other    Spinal stenosis of lumbar region with neurogenic claudication - Primary    Relevant Orders    Ambulatory referral to Pain Management    Weakness of left hip    Relevant Orders    Ambulatory referral to Pain Management       Subjective/Objective     Chief Complaint    Left leg pain and weakness      HPI    Pleasant Pt on continuous pulse oximetry with a pulse ox of 97% on room air. No acute s/s of respiratory distress. Safety and comfort measures maintained. 44-year-old Alzheimer's activity  with longstanding history in his lower back and left leg  In the past that had responded to some extent to chiropractic and physical therapy  He has never tried injections for his pain  He denies any pain, weakness or numbness in his right leg or changes in bowel bladder function  He describes pain in his lower back which radiates through the buttock and into his hamstring  Below his knee he will have numbness radiating to the left foot  He also describes weakness on left hip flexion and tenderness in around the medial thigh and groin area  His pain seems to be exacerbated with standing and walking, though he can have pain and numbness at all times  He presents today with an MRI of the lumbar spine  Of note, he denies any pain, weakness or numbness in his arms  ZOHAIB PENNY personally reviewed and updated  Review of Systems   Constitutional: Negative  HENT: Negative  Eyes: Negative  Respiratory: Negative  Cardiovascular: Negative  Gastrointestinal: Negative  Endocrine: Negative  Genitourinary: Positive for urgency (due to Enlarged prostate)  Musculoskeletal: Positive for back pain (left side lower back radiates into left buttock down left leg), gait problem (due to left leg weakness, left leg wants to give out on him, fell twice, once of a chair and another slipped on ice) and myalgias (left leg muscle tightness/spasms)  Previous PT dorys helped, helped reduce pain but pain would always return  No PM/Injections   Skin: Negative  Allergic/Immunologic: Negative  Neurological: Positive for dizziness, weakness (left leg) and numbness (constant numbness in left leg/thigh)  Negative for tremors, seizures, syncope and headaches  Hematological: Does not bruise/bleed easily (patient on ASA 81)  Psychiatric/Behavioral: Positive for sleep disturbance (due to pain and constant numbness)         Family History    Family History   Problem Relation Age of Onset   • Arthritis Mother    • Hypertension Mother    • Hypertension Father    • Heart disease Father    • Coronary artery disease Father    • Depression Father    • Hypertension Brother    • Cancer Maternal Grandfather        Social History    Social History     Socioeconomic History   • Marital status: /Civil Union     Spouse name: Not on file   • Number of children: Not on file   • Years of education: Not on file   • Highest education level: Not on file   Occupational History   • Not on file   Tobacco Use   • Smoking status: Former     Packs/day: 0 50     Years: 15 00     Pack years: 7 50     Types: Cigarettes     Start date:      Quit date: 4/15/2016     Years since quittin 9   • Smokeless tobacco: Never   Vaping Use   • Vaping Use: Never used   Substance and Sexual Activity   • Alcohol use: Not Currently     Comment: 2-3 beers every cpl days   • Drug use: Yes     Frequency: 2 0 times per week     Types: Marijuana     Comment: daily    • Sexual activity: Not on file   Other Topics Concern   • Not on file   Social History Narrative    Lack of exercise    Pets/animals: Dog    Sleeps 8-10 hours a day             Social Determinants of Health     Financial Resource Strain: Not on file   Food Insecurity: Not on file   Transportation Needs: Not on file   Physical Activity: Not on file   Stress: Not on file   Social Connections: Not on file   Intimate Partner Violence: Not on file   Housing Stability: Not on file       Past Medical History    Past Medical History:   Diagnosis Date   • Coronary artery disease    • Depression    • Dermoid cyst of face     resolved 2016   • Hyperlipidemia    • Myocardial infarction ([facility] [address] ) 04/15/2016    99% RCA block   • Presenile dementia, depressed type     onst 2009, resolved 2016   • Psychiatric disorder     depression       Surgical History    Past Surgical History:   Procedure Laterality Date   • APPENDECTOMY     • COLONOSCOPY N/A 12/14/2016    Procedure: COLONOSCOPY;  Surgeon: Juan Paniagua MD;  Location: [place] [address] GI LAB; Service:    • CORONARY ANGIOPLASTY WITH STENT PLACEMENT Right 04/15/2016   • ESOPHAGOGASTRODUODENOSCOPY N/A 12/14/2016    Procedure: ESOPHAGOGASTRODUODENOSCOPY (EGD); Surgeon: Juan Paniagua MD;  Location: [facility] GI LAB;   Service:        Medications      Current Outpatient Medications:   •  aspirin 81 mg chewable tablet, Chew 81 mg daily, Disp: , Rfl:   •  desvenlafaxine (PRISTIQ) 100 mg 24 hr tablet, Take 1 tablet (100 mg total) by mouth daily, Disp: 90 tablet, Rfl: 1  •  glucosamine-chondroitin 500-400 MG tablet, Take 1 tablet by mouth in the morning, Disp: , Rfl:   •  Multiple Vitamins-Minerals (PX MENS MULTIVITAMINS) TABS, Take 1 tablet by mouth daily, Disp: , Rfl:   •  rosuvastatin (CRESTOR) 20 MG tablet, Take 1 tablet (20 mg total) by mouth daily, Disp: 90 tablet, Rfl: 0  •  Ascorbic Acid (vitamin C) 100 MG tablet, Take 100 mg by mouth daily (Patient not taking: Reported on 3/28/2023), Disp: , Rfl:   •  cyanocobalamin (VITAMIN B-12) 100 mcg tablet, Take by mouth daily (Patient not taking: Reported on 3/16/2023), Disp: , Rfl:   •  cyclobenzaprine (FLEXERIL) 10 mg tablet, Take 1 tablet (10 mg total) by mouth daily at bedtime for 21 days (Patient not taking: Reported on 3/28/2023), Disp: 21 tablet, Rfl: 0  •  folic acid (FOLVITE) 1 mg tablet, Take 1 mg by mouth daily (Patient not taking: Reported on 3/16/2023), Disp: , Rfl:   •  predniSONE 20 mg tablet, 4 tabs for three days, 3 tabs for three days, 2 tabs for three days, 1 tab for three days, 1/2 tab for 4 days, Disp: 32 tablet, Rfl: 0    Allergies    Allergies   Allergen Reactions   • Amoxicillin Itching   • Lipitor [Atorvastatin]        The following portions of the patient's history were reviewed and updated as appropriate: allergies, current medications, past family history, past medical history, past social history, past surgical history and problem list     Investigations    I personally reviewed the MRI results with the patient:    MRI of the lumbar spine without contrast dated February 27, 2023  Normal lumbar lordosis  Degenerative changes throughout the lumbar spine  Biforaminal stenosis at L4-5 secondary degenerative changes  At L5-S1 there is severe left foraminal and extraforaminal stenosis secondary to disc bulge and facet and ligamentous hypertrophy  No other areas of significant neural compression  Intrathecal contents unremarkable  Physical Exam    Vitals:  Blood pressure 111/72, pulse 69, temperature 98 3 °F (36 8 °C), resp  rate 18, height 6' (1 829 m), weight 113 kg (250 lb)  ,Body mass index is 33 91 kg/m²  Physical Exam  Vitals reviewed  Constitutional:       General: He is not in acute distress  Eyes:      Extraocular Movements: Extraocular movements intact  Pulmonary:      Effort: Pulmonary effort is normal  No respiratory distress  Musculoskeletal:         General: No deformity  Comments: Some restriction in passive range of motion of the left hip, though manipulation does not produce pain  Skin:     General: Skin is warm and dry  Neurological:      Mental Status: He is alert and oriented to person, place, and time  Motor: Weakness present  Gait: Gait abnormal       Comments: 5/5 power in lower extremities aside from 4/5 power on left hip flexion  Some variability in effort on dorsiflexion and plantarflexion though this seems to be somewhat antalgic in nature  Reports diminished light touch sensation in a stocking distribution on the left  No clonus  Walks with a stooped forward posture favoring the left leg  Appears to have some restriction range of motion of the left hip  Psychiatric:         Mood and Affect: Mood normal          Behavior: Behavior normal        Neurologic Exam     Mental Status   Oriented to person, place, and time  Received report from Terese Kelley. pt A&Ox4 able to follow all commands. Pulse, motor, sensation present and equal in all 4 extremities. pt Breathing spontaneous and unlabored on room air, Skin warm and dry and of color appropriate for ethnicity , moves all extremities, speech clear. additional labs ordered drawn and sent to lab. pending radiology/lab results. no further nurse intervention needed at this time.

## 2024-05-22 ENCOUNTER — TELEPHONE (OUTPATIENT)
Dept: FAMILY MEDICINE CLINIC | Facility: CLINIC | Age: 64
End: 2024-05-22

## 2024-06-14 ENCOUNTER — APPOINTMENT (OUTPATIENT)
Dept: LAB | Facility: CLINIC | Age: 64
End: 2024-06-14
Payer: COMMERCIAL

## 2024-06-14 ENCOUNTER — TRANSCRIBE ORDERS (OUTPATIENT)
Dept: LAB | Facility: CLINIC | Age: 64
End: 2024-06-14

## 2024-06-14 DIAGNOSIS — Z00.8 HEALTH EXAMINATION IN POPULATION SURVEYS: Primary | ICD-10-CM

## 2024-06-14 DIAGNOSIS — E78.2 MIXED HYPERLIPIDEMIA: ICD-10-CM

## 2024-06-14 LAB
ALBUMIN SERPL BCP-MCNC: 4 G/DL (ref 3.5–5)
ALP SERPL-CCNC: 64 U/L (ref 34–104)
ALT SERPL W P-5'-P-CCNC: 21 U/L (ref 7–52)
ANION GAP SERPL CALCULATED.3IONS-SCNC: 6 MMOL/L (ref 4–13)
AST SERPL W P-5'-P-CCNC: 17 U/L (ref 13–39)
BILIRUB SERPL-MCNC: 0.64 MG/DL (ref 0.2–1)
BUN SERPL-MCNC: 16 MG/DL (ref 5–25)
CALCIUM SERPL-MCNC: 8.6 MG/DL (ref 8.4–10.2)
CHLORIDE SERPL-SCNC: 105 MMOL/L (ref 96–108)
CHOLEST SERPL-MCNC: 101 MG/DL
CO2 SERPL-SCNC: 30 MMOL/L (ref 21–32)
CREAT SERPL-MCNC: 0.92 MG/DL (ref 0.6–1.3)
ERYTHROCYTE [DISTWIDTH] IN BLOOD BY AUTOMATED COUNT: 13.2 % (ref 11.6–15.1)
EST. AVERAGE GLUCOSE BLD GHB EST-MCNC: 114 MG/DL
GFR SERPL CREATININE-BSD FRML MDRD: 88 ML/MIN/1.73SQ M
GLUCOSE P FAST SERPL-MCNC: 100 MG/DL (ref 65–99)
HBA1C MFR BLD: 5.6 %
HCT VFR BLD AUTO: 40.7 % (ref 36.5–49.3)
HDLC SERPL-MCNC: 42 MG/DL
HGB BLD-MCNC: 13.5 G/DL (ref 12–17)
LDLC SERPL CALC-MCNC: 47 MG/DL (ref 0–100)
MCH RBC QN AUTO: 31.7 PG (ref 26.8–34.3)
MCHC RBC AUTO-ENTMCNC: 33.2 G/DL (ref 31.4–37.4)
MCV RBC AUTO: 96 FL (ref 82–98)
NONHDLC SERPL-MCNC: 59 MG/DL
PLATELET # BLD AUTO: 184 THOUSANDS/UL (ref 149–390)
PMV BLD AUTO: 10.5 FL (ref 8.9–12.7)
POTASSIUM SERPL-SCNC: 4.1 MMOL/L (ref 3.5–5.3)
PROT SERPL-MCNC: 6.1 G/DL (ref 6.4–8.4)
RBC # BLD AUTO: 4.26 MILLION/UL (ref 3.88–5.62)
SODIUM SERPL-SCNC: 141 MMOL/L (ref 135–147)
TRIGL SERPL-MCNC: 60 MG/DL
WBC # BLD AUTO: 6.28 THOUSAND/UL (ref 4.31–10.16)

## 2024-06-14 PROCEDURE — 83036 HEMOGLOBIN GLYCOSYLATED A1C: CPT

## 2024-06-14 PROCEDURE — 80053 COMPREHEN METABOLIC PANEL: CPT

## 2024-06-14 PROCEDURE — 36415 COLL VENOUS BLD VENIPUNCTURE: CPT

## 2024-06-20 ENCOUNTER — TELEPHONE (OUTPATIENT)
Dept: CARDIOLOGY CLINIC | Facility: CLINIC | Age: 64
End: 2024-06-20

## 2024-06-20 NOTE — TELEPHONE ENCOUNTER
----- Message from Tony Rayo DO sent at 6/19/2024  9:53 PM EDT -----  Can you please let the patient know blood work was normal

## 2024-06-26 ENCOUNTER — OFFICE VISIT (OUTPATIENT)
Dept: FAMILY MEDICINE CLINIC | Facility: CLINIC | Age: 64
End: 2024-06-26
Payer: COMMERCIAL

## 2024-06-26 VITALS
BODY MASS INDEX: 35.42 KG/M2 | SYSTOLIC BLOOD PRESSURE: 118 MMHG | WEIGHT: 253 LBS | HEART RATE: 80 BPM | TEMPERATURE: 98.3 F | RESPIRATION RATE: 18 BRPM | DIASTOLIC BLOOD PRESSURE: 68 MMHG | HEIGHT: 71 IN

## 2024-06-26 DIAGNOSIS — F41.9 ANXIETY: ICD-10-CM

## 2024-06-26 DIAGNOSIS — Z12.5 SCREENING FOR PROSTATE CANCER: ICD-10-CM

## 2024-06-26 DIAGNOSIS — F33.0 MILD EPISODE OF RECURRENT MAJOR DEPRESSIVE DISORDER (HCC): ICD-10-CM

## 2024-06-26 DIAGNOSIS — E78.2 MIXED HYPERLIPIDEMIA: ICD-10-CM

## 2024-06-26 DIAGNOSIS — Z00.00 WELL ADULT EXAM: Primary | ICD-10-CM

## 2024-06-26 DIAGNOSIS — R73.09 ABNORMAL GLUCOSE: ICD-10-CM

## 2024-06-26 DIAGNOSIS — M19.90 ARTHRITIS: ICD-10-CM

## 2024-06-26 PROCEDURE — 99396 PREV VISIT EST AGE 40-64: CPT | Performed by: FAMILY MEDICINE

## 2024-06-26 RX ORDER — ALPRAZOLAM 0.25 MG/1
0.25 TABLET ORAL
Qty: 20 TABLET | Refills: 0 | Status: SHIPPED | OUTPATIENT
Start: 2024-06-26

## 2024-06-26 RX ORDER — MELOXICAM 15 MG/1
15 TABLET ORAL DAILY
Qty: 30 TABLET | Refills: 2 | Status: SHIPPED | OUTPATIENT
Start: 2024-06-26 | End: 2024-09-24

## 2024-06-26 NOTE — PROGRESS NOTES
FAMILY PRACTICE HEALTH MAINTENANCE OFFICE VISIT  [facility]    NAME: Kee Bardales III  AGE: 64 y.o. SEX: male  : 1960     DATE: 2024    Assessment and Plan     1. Well adult exam  2. Mixed hyperlipidemia  -     CBC; Future  -     Comprehensive metabolic panel; Future; Expected date: 2024  -     Lipid Panel with Direct LDL reflex; Future; Expected date: 2024  3. Arthritis  -     RF Screen w/ Reflex to Titer; Future  -     Cyclic citrul peptide antibody, IgG; Future  -     Ambulatory Referral to Rheumatology; Future  -     meloxicam (MOBIC) 15 mg tablet; Take 1 tablet (15 mg total) by mouth daily As needed  -     CBC; Future  -     Comprehensive metabolic panel; Future; Expected date: 2024  -     Lipid Panel with Direct LDL reflex; Future; Expected date: 2024  4. Abnormal glucose  -     CBC; Future  -     Comprehensive metabolic panel; Future; Expected date: 2024  -     Lipid Panel with Direct LDL reflex; Future; Expected date: 2024  5. Mild episode of recurrent major depressive disorder (HCC)  Assessment & Plan:  Depression is ok but pt finds he gets outbursts  Feels the pristique is ok  He is working on it - thinking about trying xanax  6. Anxiety  -     ALPRAZolam (XANAX) 0.25 mg tablet; Take 1 tablet (0.25 mg total) by mouth daily at bedtime as needed for anxiety for up to 20 doses  7. Screening for prostate cancer  -     PSA, Total Screen; Future    Patient Counseling:   Nutrition: Stressed importance of a well balanced diet, moderation of sodium/saturated fat, caloric balance and sufficient intake of fiber  Exercise: Stressed the importance of regular exercise with a goal of 150 minutes per week  Dental Health: Discussed daily flossing and brushing and regular dental visits     Immunizations reviewed: Up To Date  Discussed benefits of:  Colon Cancer Screening.  BMI Counseling: Body mass index is 35.79 kg/m². Discussed  with patient's BMI with him. The BMI is above normal. Nutrition recommendations include reducing portion sizes.    No follow-ups on file.        Chief Complaint     Chief Complaint   Patient presents with   • Physical Exam     Sas/cma       History of Present Illness     Pt is sched for a full physical  Recently retired      Pt states for the past 6 months he has beemn dealing with swelling and pain in his hands.  His mom had RA        Well Adult Physical   Patient here for a comprehensive physical exam.      Diet and Physical Activity  Diet: well balanced diet  Exercise: frequently      Depression Screen  PHQ-2/9 Depression Screening    Little interest or pleasure in doing things: 0 - not at all  Feeling down, depressed, or hopeless: 1 - several days  Trouble falling or staying asleep, or sleeping too much: 0 - not at all  Feeling tired or having little energy: 1 - several days  Poor appetite or overeatin - not at all  Feeling bad about yourself - or that you are a failure or have let yourself or your family down: 2 - more than half the days  Trouble concentrating on things, such as reading the newspaper or watching television: 0 - not at all  Moving or speaking so slowly that other people could have noticed. Or the opposite - being so fidgety or restless that you have been moving around a lot more than usual: 1 - several days  Thoughts that you would be better off dead, or of hurting yourself in some way: 0 - not at all  PHQ-9 Score: 5  PHQ-9 Interpretation: Mild depression          General Health  Hearing: Normal:  bilateral  Vision: wears glasses  Dental: regular dental visits    Reproductive Health  No issues       The following portions of the patient's history were reviewed and updated as appropriate: allergies, current medications, past family history, past medical history, past social history, past surgical history and problem list.    Review of Systems     Review of Systems   Constitutional:  Negative  for activity change, appetite change, chills, diaphoresis, fatigue, fever and unexpected weight change.   HENT:  Negative for congestion, dental problem, ear pain, mouth sores, sinus pressure, sinus pain, sore throat and trouble swallowing.    Eyes:  Negative for photophobia, discharge and itching.   Respiratory:  Negative for apnea, chest tightness and shortness of breath.    Cardiovascular:  Negative for chest pain, palpitations and leg swelling.   Gastrointestinal:  Negative for abdominal distention, abdominal pain, blood in stool, nausea and vomiting.   Endocrine: Negative for cold intolerance, heat intolerance, polydipsia, polyphagia and polyuria.   Genitourinary:  Negative for difficulty urinating.   Musculoskeletal:  Positive for back pain. Negative for arthralgias.   Skin:  Negative for color change and wound.   Neurological:  Negative for dizziness, syncope, speech difficulty and headaches.   Hematological:  Negative for adenopathy.   Psychiatric/Behavioral:  Negative for agitation and behavioral problems.        Past Medical History     Past Medical History:   Diagnosis Date   • Coronary artery disease    • Depression    • Dermoid cyst of face     resolved 04/19/2016   • Hyperlipidemia    • Myocardial infarction (HCC) 04/15/2016    99% RCA block   • Presenile dementia, depressed type (HCC)     onst 11/03/2009, resolved 07/21/2016   • Psychiatric disorder     depression       Past Surgical History     Past Surgical History:   Procedure Laterality Date   • APPENDECTOMY     • COLONOSCOPY N/A 12/14/2016    Procedure: COLONOSCOPY;  Surgeon: Norberto Argueta MD;  Location: [facility] GI LAB;  Service:    • CORONARY ANGIOPLASTY WITH STENT PLACEMENT Right 04/15/2016   • EPIDURAL BLOCK INJECTION N/A 03/31/2023    Procedure: L5 S1 LUMBAR epidural steroid injection (79089);  Surgeon: Saroj Rose DO;  Location: [facility] MAIN OR;  Service: Pain Management    • ESOPHAGOGASTRODUODENOSCOPY N/A 12/14/2016    Procedure:  ESOPHAGOGASTRODUODENOSCOPY (EGD);  Surgeon: Norberto Argueta MD;  Location: [facility] GI LAB;  Service:    • LUMBAR EPIDURAL INJECTION N/A 2023    Procedure: L5 S1  LUMBAR epidural steroid injection (68704);  Surgeon: Saroj Rose DO;  Location: [facility] MAIN OR;  Service: Pain Management    • UPPER GASTROINTESTINAL ENDOSCOPY         Social History     Social History     Socioeconomic History   • Marital status: /Civil Union     Spouse name: None   • Number of children: None   • Years of education: None   • Highest education level: None   Occupational History   • None   Tobacco Use   • Smoking status: Former     Current packs/day: 0.00     Average packs/day: 0.5 packs/day for 15.3 years (7.6 ttl pk-yrs)     Types: Cigarettes     Start date:      Quit date: 4/15/2016     Years since quittin.2     Passive exposure: Past   • Smokeless tobacco: Never   Vaping Use   • Vaping status: Never Used   Substance and Sexual Activity   • Alcohol use: Yes     Comment: 2-3 beers a week per pt 24   • Drug use: Yes     Frequency: 2.0 times per week     Types: Marijuana     Comment: daily    • Sexual activity: None   Other Topics Concern   • None   Social History Narrative    Lack of exercise    Pets/animals: Dog    Sleeps 8-10 hours a day             Social Determinants of Health     Financial Resource Strain: Not on file   Food Insecurity: Not on file   Transportation Needs: Not on file   Physical Activity: Not on file   Stress: Not on file   Social Connections: Not on file   Intimate Partner Violence: Not on file   Housing Stability: Not on file       Family History     Family History   Problem Relation Age of Onset   • Arthritis Mother    • Hypertension Mother    • Hypertension Father    • Heart disease Father    • Coronary artery disease Father    • Depression Father    • Hypertension Brother    • Cancer Maternal Grandfather        Current Medications       Current Outpatient Medications:   •   "ALPRAZolam (XANAX) 0.25 mg tablet, Take 1 tablet (0.25 mg total) by mouth daily at bedtime as needed for anxiety for up to 20 doses, Disp: 20 tablet, Rfl: 0  •  meloxicam (MOBIC) 15 mg tablet, Take 1 tablet (15 mg total) by mouth daily As needed, Disp: 30 tablet, Rfl: 2  •  aspirin 81 mg chewable tablet, Chew 81 mg daily, Disp: , Rfl:   •  desvenlafaxine (PRISTIQ) 100 mg 24 hr tablet, Take 1 tablet (100 mg total) by mouth daily, Disp: 90 tablet, Rfl: 1  •  Multiple Vitamins-Minerals (MULTIVITAMIN ADULTS 50+ PO), Take by mouth, Disp: , Rfl:   •  pantoprazole (PROTONIX) 40 mg tablet, Take 1 tablet (40 mg total) by mouth daily, Disp: 30 tablet, Rfl: 1  •  rosuvastatin (CRESTOR) 20 MG tablet, Take 1 tablet (20 mg total) by mouth daily, Disp: 90 tablet, Rfl: 1  •  Saw Palmetto, Serenoa repens, (SAW PALMETTO PO), Take by mouth Unsure of dosage, Disp: , Rfl:   •  tadalafil (CIALIS) 20 MG tablet, Take 1 tablet (20 mg total) by mouth daily as needed for erectile dysfunction, Disp: 10 tablet, Rfl: 1     Allergies     Allergies   Allergen Reactions   • Amoxicillin Itching   • Lipitor [Atorvastatin]        Objective     /68   Pulse 80   Temp 98.3 °F (36.8 °C)   Resp 18   Ht 5' 10.5\" (1.791 m)   Wt 115 kg (253 lb)   BMI 35.79 kg/m²      Physical Exam  Vitals and nursing note reviewed.   Constitutional:       General: He is not in acute distress.     Appearance: He is well-developed. He is not diaphoretic.   HENT:      Head: Normocephalic and atraumatic.      Right Ear: External ear normal.      Left Ear: External ear normal.      Nose: Nose normal.      Mouth/Throat:      Pharynx: No oropharyngeal exudate.   Eyes:      General: No scleral icterus.        Right eye: No discharge.         Left eye: No discharge.      Pupils: Pupils are equal, round, and reactive to light.   Neck:      Thyroid: No thyromegaly.   Cardiovascular:      Rate and Rhythm: Normal rate.      Heart sounds: Normal heart sounds. No murmur " heard.  Pulmonary:      Effort: Pulmonary effort is normal. No respiratory distress.      Breath sounds: Normal breath sounds. No wheezing.   Abdominal:      General: Bowel sounds are normal. There is no distension.      Palpations: Abdomen is soft. There is no mass.      Tenderness: There is no abdominal tenderness. There is no guarding or rebound.   Genitourinary:     Prostate: Normal.   Musculoskeletal:         General: Normal range of motion.   Skin:     General: Skin is warm and dry.      Findings: No erythema or rash.   Neurological:      Mental Status: He is alert.      Coordination: Coordination normal.      Deep Tendon Reflexes: Reflexes normal.   Psychiatric:         Behavior: Behavior normal.           Vision Screening    Right eye Left eye Both eyes   Without correction 20/30 20/25 20/25   With correction              Akhil Lombardi, DO [facility]

## 2024-06-26 NOTE — ASSESSMENT & PLAN NOTE
Depression is ok but pt finds he gets outbursts  Feels the pristique is ok  He is working on it - thinking about trying xanax

## 2024-07-03 ENCOUNTER — TELEPHONE (OUTPATIENT)
Dept: UROLOGY | Facility: CLINIC | Age: 64
End: 2024-07-03

## 2024-07-03 NOTE — TELEPHONE ENCOUNTER
Appt mao. Not seen since 2021. Please reschedule with any aP for follow up and inquire what he needs to follow up for as it is not indicated in appt note

## 2024-07-03 NOTE — TELEPHONE ENCOUNTER
Called and LVM for pt that Dr schedule had to change and his appt has been cancelled for the 17th and need to reschedule. Please call back to reschedule.    When pt calls back reschedule with an AP per RN. And not sure what this is a follow up for?

## 2024-07-05 NOTE — TELEPHONE ENCOUNTER
CALLED PT AGAIN AND SCHEDULED APPT FOR 7/12/24 AT 1:15PM. HE IS TO CALL BACK AND CONFIRM. ALSO, WHEN PT CALLS BACK ASK WHAT HE NEEDS TO BE SEEN FOR.

## 2024-07-08 ENCOUNTER — TELEPHONE (OUTPATIENT)
Dept: VASCULAR SURGERY | Facility: CLINIC | Age: 64
End: 2024-07-08

## 2024-07-12 NOTE — TELEPHONE ENCOUNTER
Patient's spouse Bessy called this AM to inform the office that the patient needs to cancel 7/12 1 PM appt with ESTHER Rajput. Patient's mom had a stroke and is requiring emergency surgery.    Pt will call back to reschedule.    No further action needed at this time.

## 2024-07-24 DIAGNOSIS — R13.10 DYSPHAGIA, UNSPECIFIED TYPE: ICD-10-CM

## 2024-07-24 RX ORDER — PANTOPRAZOLE SODIUM 40 MG/1
40 TABLET, DELAYED RELEASE ORAL DAILY
Qty: 30 TABLET | Refills: 1 | Status: SHIPPED | OUTPATIENT
Start: 2024-07-24

## 2024-07-25 DIAGNOSIS — I21.11 ST ELEVATION (STEMI) MYOCARDIAL INFARCTION INVOLVING RIGHT CORONARY ARTERY (HCC): ICD-10-CM

## 2024-07-25 DIAGNOSIS — I25.10 ARTERIOSCLEROTIC HEART DISEASE: ICD-10-CM

## 2024-07-25 DIAGNOSIS — F33.0 MILD EPISODE OF RECURRENT MAJOR DEPRESSIVE DISORDER (HCC): ICD-10-CM

## 2024-07-25 RX ORDER — DESVENLAFAXINE 100 MG/1
100 TABLET, EXTENDED RELEASE ORAL DAILY
Qty: 100 TABLET | Refills: 1 | Status: SHIPPED | OUTPATIENT
Start: 2024-07-25

## 2024-07-25 RX ORDER — ROSUVASTATIN CALCIUM 20 MG/1
20 TABLET, COATED ORAL DAILY
Qty: 100 TABLET | Refills: 1 | Status: SHIPPED | OUTPATIENT
Start: 2024-07-25

## 2024-07-25 NOTE — TELEPHONE ENCOUNTER
Reason for call: Not a duplicate pharmacy changed   [x] Refill   [] Prior Auth  [] Other:     Office:   [x] PCP/Provider - DR Lombardi   [] Specialty/Provider -     Medication: rosuvastatin     Dose/Frequency: 20 mg     Quantity: 90D    Pharmacy: [facility]    Does the patient have enough for 3 days?   [x] Yes   [] No - Send as HP to POD

## 2024-08-30 ENCOUNTER — APPOINTMENT (OUTPATIENT)
Dept: LAB | Facility: CLINIC | Age: 64
End: 2024-08-30
Payer: COMMERCIAL

## 2024-08-30 DIAGNOSIS — Z12.5 SCREENING FOR PROSTATE CANCER: ICD-10-CM

## 2024-08-30 DIAGNOSIS — R73.09 ABNORMAL GLUCOSE: ICD-10-CM

## 2024-08-30 DIAGNOSIS — E78.2 MIXED HYPERLIPIDEMIA: ICD-10-CM

## 2024-08-30 DIAGNOSIS — M19.90 ARTHRITIS: ICD-10-CM

## 2024-08-30 LAB
ERYTHROCYTE [DISTWIDTH] IN BLOOD BY AUTOMATED COUNT: 13.2 % (ref 11.6–15.1)
HCT VFR BLD AUTO: 40.9 % (ref 36.5–49.3)
HGB BLD-MCNC: 13.7 G/DL (ref 12–17)
MCH RBC QN AUTO: 31.5 PG (ref 26.8–34.3)
MCHC RBC AUTO-ENTMCNC: 33.5 G/DL (ref 31.4–37.4)
MCV RBC AUTO: 94 FL (ref 82–98)
PLATELET # BLD AUTO: 193 THOUSANDS/UL (ref 149–390)
PMV BLD AUTO: 10.8 FL (ref 8.9–12.7)
PSA SERPL-MCNC: 0.41 NG/ML (ref 0–4)
RBC # BLD AUTO: 4.35 MILLION/UL (ref 3.88–5.62)
WBC # BLD AUTO: 6.22 THOUSAND/UL (ref 4.31–10.16)

## 2024-08-30 PROCEDURE — G0103 PSA SCREENING: HCPCS

## 2024-08-30 PROCEDURE — 86430 RHEUMATOID FACTOR TEST QUAL: CPT

## 2024-08-30 PROCEDURE — 86200 CCP ANTIBODY: CPT

## 2024-08-30 PROCEDURE — 36415 COLL VENOUS BLD VENIPUNCTURE: CPT

## 2024-08-30 PROCEDURE — 85027 COMPLETE CBC AUTOMATED: CPT

## 2024-08-31 LAB — CCP AB SER IA-ACNC: 0.5

## 2024-09-02 LAB — RHEUMATOID FACT SER QL LA: NEGATIVE

## 2024-09-27 ENCOUNTER — CONSULT (OUTPATIENT)
Dept: RHEUMATOLOGY | Facility: CLINIC | Age: 64
End: 2024-09-27
Payer: COMMERCIAL

## 2024-09-27 VITALS
BODY MASS INDEX: 36.82 KG/M2 | OXYGEN SATURATION: 95 % | WEIGHT: 263 LBS | HEIGHT: 71 IN | SYSTOLIC BLOOD PRESSURE: 120 MMHG | HEART RATE: 61 BPM | DIASTOLIC BLOOD PRESSURE: 80 MMHG

## 2024-09-27 DIAGNOSIS — M19.90 ARTHRITIS: Primary | ICD-10-CM

## 2024-09-27 DIAGNOSIS — M54.16 LUMBAR RADICULOPATHY: ICD-10-CM

## 2024-09-27 PROCEDURE — 99244 OFF/OP CNSLTJ NEW/EST MOD 40: CPT | Performed by: STUDENT IN AN ORGANIZED HEALTH CARE EDUCATION/TRAINING PROGRAM

## 2024-09-27 NOTE — PROGRESS NOTES
Ambulatory Visit  Name: Kee Bardales III      : 1960      MRN: [id number]  Encounter Provider: Misael Oropeza DO  Encounter Date: 2024   Encounter department: [facility] [place]    Assessment & Plan  Arthritis  Hand arthritis - some inflammatory symptoms (?dactylitis, lateral epicondylitis), some noninflammatory (no significant AM stiffness) but will investigate further. If workup suspicious for an inflammatory arthritis then will see back sooner  Orders:    Ambulatory Referral to Rheumatology    XR hand 3+ vw left; Future    XR hand 3+ vw right; Future    XR wrist 2 vw left; Future    XR wrist 2 vw right; Future    HLA-B27 antigen; Future    Sedimentation rate, automated; Future    C-reactive protein; Future    Lumbar radiculopathy  This is more concerning to me with his MRI findings and objective weakness, and ambulatory dysfunction. Not having inflammatory characteristics of his back pain.  Orders:    US MSK limited; Future    Ambulatory Referral to Neurosurgery; Future      History of Present Illness       Ref reason: arthritis    Known degen changes spine including severe bilateral foraminal narrowing    Lots of pain in the hands, have been going on for about a year and a half. Noticed a lump on one of the DIPs. Getting a lot of pain in the L base of the thumb    Right handed    Minor AM stiffness in the hands, lasts for a few minutes once he gets moving    Gets swelling in the hands, particularly the L 2nd finger.    Sometimes gets severe pain in the L lat epicondyle    Denies:  Fever  Rash  Oral/nasal/genital ulcers  Muscle weakness  Uveitis  Dysphagia/odynophagia  CP  GARCIA  SOB at rest  Pleurisy  Stomach pain  Hematochezia  Gross hematuria  Foamy urine  Raynaud's  Bowel/bladder incontinence  Saddle anesthesia  Joint issues other than noted above    Back issues: has been losing balance and has difficulty walking due to severity    Brother might have PsA, mother has  "severe arthritis    Objective     /80 (BP Location: Right arm, Patient Position: Sitting, Cuff Size: Adult)   Pulse 61   Ht 5' 10.5\" (1.791 m)   Wt 119 kg (263 lb)   SpO2 95%   BMI 37.20 kg/m²     General: Well appearing, in no distress.   Eyes: Sclera non-icteric. EOMI  Extremities: Warm, well perfused, no edema. No pitting of fingernails.  Neuro: Alert and oriented. No gross focal neurological deficits.   Skin: No rashes.  MSK exam: Mild tenderness to palpation L lat epicondyle, bilateral 2nd DIPs. Some soft tissue swelling L 2nd finger. No spinal tenderness to palpation    Muscle strength   Right   Left    Shoulder abduction 5/5  Shoulder abduction 5/5   Shoulder adduction 5/5  Shoulder adduction 5/5   Shoulder internal rotation 5/5  Shoulder internal rotation 5/5   Shoulder external rotation 5/5  Shoulder external rotation 5/5   Elbow flexion 5/5  Elbow flexion 5/5   Elbow extension 5/5  Elbow extension 5/5   Wrist flexion 5/5  Wrist flexion 5/5   Wrist extension 5/5  Wrist extension 5/5   Hip flexion 5/5  Hip flexion 3/5   Hip extension 5/5  Hip extension 4/5   Hip abduction 5/5  Hip abduction 4/5   Hip adduction 5/5  Hip adduction 5/5   Knee flexion 5/5  Knee flexion 4/5   Knee extension 5/5  Knee extension 4/5   Dorsiflexion 5/5  Dorsiflexion 5/5   Plantarflexion 5/5  Plantarflexion 5/5          Neck   Flexion 5/5   Extension 5/5   R sidebending 5/5   L sidebending 5/5        Latest Reference Range & Units Most Recent   Sed Rate 0 - 10 mm/hour 26 (H)  9/7/17 11:17   CYCLIC CITRULLINATED PEPTIDE ANTIBODY See comment  0.5  8/30/24 10:17   RHEUMATOID FACTOR Negative  Negative  8/30/24 10:17   (H): Data is abnormally high    I have independently reviewed the following labs/images and interpret them as follows.  Sed Rate of 26 is NOT elevated when corrected for age/sex  "

## 2024-09-27 NOTE — PATIENT INSTRUCTIONS
Please get blood work and x-rays    You should be contacted to set up an appointment for ultrasound. Please avoid antiinflammatories including NSAIDs and steroids for 3-5 days prior to the ultrasound.    You should be contacted to set up a neurosurgery appointment. If you develop incontinence of your bowel or bladder, or you get numbness in the groin area, please go to the ER immediately.

## 2024-09-27 NOTE — ASSESSMENT & PLAN NOTE
This is more concerning to me with his MRI findings and objective weakness, and ambulatory dysfunction. Not having inflammatory characteristics of his back pain.  Orders:    US MSK limited; Future    Ambulatory Referral to Neurosurgery; Future

## 2024-10-02 ENCOUNTER — OFFICE VISIT (OUTPATIENT)
Dept: NEUROSURGERY | Facility: CLINIC | Age: 64
End: 2024-10-02
Payer: COMMERCIAL

## 2024-10-02 VITALS
TEMPERATURE: 97.6 F | WEIGHT: 263 LBS | DIASTOLIC BLOOD PRESSURE: 88 MMHG | BODY MASS INDEX: 36.82 KG/M2 | SYSTOLIC BLOOD PRESSURE: 156 MMHG | HEART RATE: 94 BPM | HEIGHT: 71 IN | OXYGEN SATURATION: 98 %

## 2024-10-02 DIAGNOSIS — R26.81 GAIT INSTABILITY: Primary | ICD-10-CM

## 2024-10-02 DIAGNOSIS — M54.16 LUMBAR RADICULOPATHY: ICD-10-CM

## 2024-10-02 PROCEDURE — 99215 OFFICE O/P EST HI 40 MIN: CPT | Performed by: PHYSICIAN ASSISTANT

## 2024-10-02 RX ORDER — METHOCARBAMOL 500 MG/1
500 TABLET, FILM COATED ORAL
Qty: 40 TABLET | Refills: 0 | Status: SHIPPED | OUTPATIENT
Start: 2024-10-02

## 2024-10-02 NOTE — PROGRESS NOTES
Neurosurgery Office Note  Kee Bardales III 64 y.o. male MRN: [id number]      Assessment & Plan     Patient is a 64 years old gentleman with past medical history of atherosclerotic heart disease status post stent placements about 5 to 6 years ago, dysphagia, venous insufficiency, arthritis, anxiety, depressive disorder, hyperlipidemia, obesity, and chronic lower back pain known to our service.  He is here today referred by his rheumatologist for evaluation of lower back pain with left leg radiculopathy.   saw the patient a year ago, and recommended conservative treatment.  Patient tried physical therapy and injections with some improvement but denies any follow-up with pain management or PT lately.  Reports his lumbar radiculopathy in the left outer calf region with numbness and also left quads weakness, gait instability and feeling dizziness, denies any neck pain, radicular symptoms in the upper extremities,   weakness or dropping objects.  No bowel or bladder issues or saddle anesthesia.  Tried chiropractics with slight improvement.  Denies taking any muscle relaxer or membrane stabilizing agents.    Exam-patient is alert and oriented x 3.  Moves all extremities.  Left quad strength is 4/5, also left flexion 4+/5, left knee extension 4/5, sensation to light touch decreased over the left outer calf region, gait unstable, that if vibration in bilateral tibial prominence.  DTR depressed throughout.  Negative Balderas's tests and clonus negative bilaterally.  Nontender on palpation of posterior cervical spine slight tenderness noted in the left lower paraspinal region.  Patient denies history of diabetes mellitus, hypertension, stroke, seizures, or bleeding disorder.  He takes baby aspirin.  Remote cigarette smoker drinks alcohol socially.    Imaging :  patient does not have any recent image.  MRI of lumbar spine in 2023 demonstrates multilevel degenerative disease more prominent in the lower lumbar  "spine at L4-5 and L5-S1 with bilateral neuroforaminal narrowing at L4-5 and moderate to severe left foraminal narrowing at L5-S1.  No significant central canal stenosis.    Hx, Exam and previous images reviewed with the patient.  Management plan discussed.  The patient has left bar radiculopathy, left leg weakness noted, additionally he has gait issues.  Unsure if this is related to his left leg weakness, for his dizziness, loss of vibration in his knees and balance problem,I would recommend follow-up with his PCP for workup, I ordered blood work for vitamin B12 level.  Referral to PT and pain management order placed.  Will see the patient once he tried conservative treatments and upon completion of his lumbar spine MRI, shared visit with spine surgeon.  Questions and concerns were answered.  He understands instructions and agreed with the plan.    Plan:  MRI of lumbar spine without contrast  Ambulatory referral to pain management  Ambulatory referral to PT  Vitamin B12 workup  Advised patient to follow-up with his PCP for gait dysfunction and dizziness workups  Prescription for Robaxin sent to his pharmacy  Follow-up after conservative treatment and with image completion, shared visit with spine surgeon  Call with question or concern.        History of Present Illness     C/C: \" 64 y.o. year old male here today referred for Lumbar raidc eval\"    HPI    See detailed discussion above        REVIEW OF SYSTEMS    Review of Systems   Constitutional: Negative.    HENT: Negative.     Eyes: Negative.    Respiratory: Negative.     Cardiovascular: Negative.    Gastrointestinal: Negative.    Endocrine: Negative.    Genitourinary: Negative.    Musculoskeletal:  Positive for back pain, gait problem and myalgias (b/l leg cramping).         (B) Lbp radiates to to b/l legs wrapping around the thighs x 2 yrs.   Also has pain into left groin. No inciting event  + N/T/W on BLE  and difficulty walking      Pain 7/10 Intermittent, " [place], [place]  PM 2019  Inj 6/14/23, 3/31/23   PT 2021  Aspirin/former smoker/ no previous back sx   Skin: Negative.    Allergic/Immunologic: Negative.    Neurological:  Positive for weakness and numbness.   Hematological: Negative.    Psychiatric/Behavioral:  Positive for sleep disturbance.    All other systems reviewed and are negative.      ROS obtained by MA. Reviewed. See HPI.     Meds/Allergies     Current Outpatient Medications   Medication Sig Dispense Refill    ALPRAZolam (XANAX) 0.25 mg tablet Take 1 tablet (0.25 mg total) by mouth daily at bedtime as needed for anxiety for up to 20 doses 20 tablet 0    aspirin 81 mg chewable tablet Chew 81 mg daily      desvenlafaxine (PRISTIQ) 100 mg 24 hr tablet Take 1 tablet (100 mg total) by mouth daily 100 tablet 1    meloxicam (MOBIC) 15 mg tablet Take 1 tablet (15 mg total) by mouth daily As needed 30 tablet 2    Multiple Vitamins-Minerals (MULTIVITAMIN ADULTS 50+ PO) Take by mouth      pantoprazole (PROTONIX) 40 mg tablet TAKE 1 TABLET BY MOUTH EVERY DAY (Patient not taking: Reported on 9/27/2024) 30 tablet 1    rosuvastatin (CRESTOR) 20 MG tablet Take 1 tablet (20 mg total) by mouth daily 100 tablet 1    Saw Palmetto, Serenoa repens, (SAW PALMETTO PO) Take by mouth Unsure of dosage      tadalafil (CIALIS) 20 MG tablet Take 1 tablet (20 mg total) by mouth daily as needed for erectile dysfunction 10 tablet 1     No current facility-administered medications for this visit.       Allergies   Allergen Reactions    Amoxicillin Itching    Lipitor [Atorvastatin]        PAST HISTORY    Past Medical History:   Diagnosis Date    Coronary artery disease     Depression     Dermoid cyst of face     resolved 04/19/2016    Hyperlipidemia     Myocardial infarction (HCC) 04/15/2016    99% RCA block    Presenile dementia, depressed type (HCC)     onst 11/03/2009, resolved 07/21/2016    Psychiatric disorder     depression       Past Surgical History:   Procedure Laterality Date     APPENDECTOMY      COLONOSCOPY N/A 2016    Procedure: COLONOSCOPY;  Surgeon: Norberto Argueta MD;  Location: [facility] GI [facility];  Service:     CORONARY ANGIOPLASTY WITH STENT PLACEMENT Right 04/15/2016    EPIDURAL BLOCK INJECTION N/A 2023    Procedure: L5 S1 LUMBAR epidural steroid injection (49964);  Surgeon: Saroj Rose DO;  Location: [facility] MAIN OR;  Service: Pain Management     ESOPHAGOGASTRODUODENOSCOPY N/A 2016    Procedure: ESOPHAGOGASTRODUODENOSCOPY (EGD);  Surgeon: Norberto Argueta MD;  Location: [facility] GI [facility];  Service:     LUMBAR EPIDURAL INJECTION N/A 2023    Procedure: L5 S1  LUMBAR epidural steroid injection (48813);  Surgeon: Saroj Rose DO;  Location: [facility] MAIN OR;  Service: Pain Management     UPPER GASTROINTESTINAL ENDOSCOPY         Social History     Tobacco Use    Smoking status: Former     Current packs/day: 0.00     Average packs/day: 0.5 packs/day for 15.3 years (7.6 ttl pk-yrs)     Types: Cigarettes     Start date:      Quit date: 4/15/2016     Years since quittin.4     Passive exposure: Past    Smokeless tobacco: Never   Vaping Use    Vaping status: Never Used   Substance Use Topics    Alcohol use: Yes     Comment: 2-3 beers a week per pt 24    Drug use: Yes     Frequency: 2.0 times per week     Types: Marijuana     Comment: daily        Family History   Problem Relation Age of Onset    Arthritis Mother     Hypertension Mother     Hypertension Father     Heart disease Father     Coronary artery disease Father     Depression Father     Hypertension Brother     Cancer Maternal Grandfather          Above history personally reviewed.       EXAM    Vitals:There were no vitals taken for this visit.,There is no height or weight on file to calculate BMI.     Physical Exam  Constitutional:       Appearance: He is obese.   Eyes:      Extraocular Movements: Extraocular movements intact.      Pupils: Pupils are equal, round, and reactive to light.    Pulmonary:      Effort: Pulmonary effort is normal.   Musculoskeletal:         General: Tenderness present.      Cervical back: Normal range of motion.   Neurological:      Mental Status: He is alert and oriented to person, place, and time.      Sensory: Sensory deficit present.      Motor: Weakness present.      Coordination: Finger-Nose-Finger Test normal.      Gait: Gait abnormal.      Deep Tendon Reflexes:      Reflex Scores:       Tricep reflexes are 1+ on the right side and 1+ on the left side.       Bicep reflexes are 1+ on the right side and 1+ on the left side.       Brachioradialis reflexes are 1+ on the right side and 1+ on the left side.       Patellar reflexes are 1+ on the right side and 1+ on the left side.       Achilles reflexes are 1+ on the right side and 1+ on the left side.  Psychiatric:         Speech: Speech normal.         Neurologic Exam     Mental Status   Oriented to person, place, and time.   Speech: speech is normal   Level of consciousness: alert    Cranial Nerves     CN III, IV, VI   Pupils are equal, round, and reactive to light.  Right pupil: Size: 3 mm. Shape: regular. Reactivity: brisk.   Left pupil: Size: 3 mm. Shape: regular. Reactivity: brisk.   Nystagmus: none     CN XI   CN XI normal.     Motor Exam   Muscle bulk: normal  Overall muscle tone: normal  Right arm tone: normal  Left arm tone: normal  Right arm pronator drift: absent  Left arm pronator drift: absent  Right leg tone: normal  Left leg tone: normal    Sensory Exam   Right arm light touch: normal  Left arm light touch: normal  Right leg light touch: normal  Left leg light touch: decreased from knee  Right leg vibration: decreased from knee  Left leg vibration: decreased from knee    Gait, Coordination, and Reflexes     Coordination   Finger to nose coordination: normal    Reflexes   Right brachioradialis: 1+  Left brachioradialis: 1+  Right biceps: 1+  Left biceps: 1+  Right triceps: 1+  Left triceps: 1+  Right  patellar: 1+  Left patellar: 1+  Right achilles: 1+  Left achilles: 1+  Right : 1+  Left : 1+  Right Balderas: absent  Left Balderas: absent  Right ankle clonus: absent  Left ankle clonus: absent        MEDICAL DECISION MAKING    Imaging Studies:     No results found.    Results Review Statement: I personally reviewed the following image studies in PACS and associated radiology reports: XR and MRI spine. My interpretation of the radiology images/reports is: Findings as described in the assessment section above.

## 2024-10-02 NOTE — LETTER
October 2, 2024     Misael Johnna, DO  [address]  [address]  [place] [address]    Patient: Kee Bardales III   YOB: 1960   Date of Visit: 10/2/2024       Dear Dr. Oropeza:    Thank you for referring Kee Bardales to me for evaluation. Below are my notes for this consultation.    If you have questions, please do not hesitate to call me. I look forward to following your patient along with you.         Sincerely,        Shivam Rodriguez PA-C        CC: No Recipients    Shivam Rodriguez PA-C  10/2/2024  8:18 AM  Incomplete  Neurosurgery Office Note  Kee Bardales III 64 y.o. male MRN: [id number]      Assessment & Plan    Patient is a 64 years old gentleman with past medical history of atherosclerotic heart disease status post stent placements about 5 to 6 years ago, dysphagia, venous insufficiency, arthritis, anxiety, depressive disorder, hyperlipidemia, obesity, and chronic lower back pain known to our service.  He is here today referred by his rheumatologist for evaluation of lower back pain with left leg radiculopathy.   saw the patient a year ago, and recommended conservative treatment.  Patient tried physical therapy and injections with some improvement but denies any follow-up with pain management or PT lately.  Reports his lumbar radiculopathy in the left outer calf region with numbness and also left quads weakness, gait instability and feeling dizziness, denies any neck pain, radicular symptoms in the upper extremities,   weakness or dropping objects.  No bowel or bladder issues or saddle anesthesia.  Tried chiropractics with slight improvement.  Denies taking any muscle relaxer or membrane stabilizing agents.    Exam-patient is alert and oriented x 3.  Moves all extremities.  Left quad strength is 4/5, also left flexion 4+/5, left knee extension 4/5, sensation to light touch decreased over the left outer calf region, gait unstable, that if vibration in bilateral tibial  "prominence.  DTR depressed throughout.  Negative Balderas's tests and clonus negative bilaterally.  Nontender on palpation of posterior cervical spine slight tenderness noted in the left lower paraspinal region.  Patient denies history of diabetes mellitus, hypertension, stroke, seizures, or bleeding disorder.  He takes baby aspirin.  Remote cigarette smoker drinks alcohol socially.    Imaging :  patient does not have any recent image.  MRI of lumbar spine in 2023 demonstrates multilevel degenerative disease more prominent in the lower lumbar spine at L4-5 and L5-S1 with bilateral neuroforaminal narrowing at L4-5 and moderate to severe left foraminal narrowing at L5-S1.  No significant central canal stenosis.    Hx, Exam and previous images reviewed with the patient.  Management plan discussed.  The patient has left bar radiculopathy, left leg weakness noted, additionally he has gait issues.  Unsure if this is related to his left leg weakness, for his dizziness, loss of vibration in his knees and balance problem,I would recommend follow-up with his PCP for workup, I ordered blood work for vitamin B12 level.  Referral to PT and pain management order placed.  Will see the patient once he tried conservative treatments and upon completion of his lumbar spine MRI, shared visit with spine surgeon.  Questions and concerns were answered.  He understands instructions and agreed with the plan.    Plan:  MRI of lumbar spine without contrast  Ambulatory referral to pain management  Ambulatory referral to PT  Vitamin B12 workup  Advised patient to follow-up with his PCP for gait dysfunction and dizziness workups  Prescription for Robaxin sent to his pharmacy  Follow-up after conservative treatment and with image completion, shared visit with spine surgeon  Call with question or concern.        History of Present Illness    C/C: \" 64 y.o. year old male here today referred for Lumbar raidc eval\"    HPI    See detailed discussion " above        REVIEW OF SYSTEMS    Review of Systems   Constitutional: Negative.    HENT: Negative.     Eyes: Negative.    Respiratory: Negative.     Cardiovascular: Negative.    Gastrointestinal: Negative.    Endocrine: Negative.    Genitourinary: Negative.    Musculoskeletal:  Positive for back pain, gait problem and myalgias (b/l leg cramping).         (B) Lbp radiates to to b/l legs wrapping around the thighs x 2 yrs.   Also has pain into left groin. No inciting event  + N/T/W on BLE  and difficulty walking      Pain 7/10 Intermittent, sharp, shooting  PM 2019  Inj 6/14/23, 3/31/23   PT 2021  Aspirin/former smoker/ no previous back sx   Skin: Negative.    Allergic/Immunologic: Negative.    Neurological:  Positive for weakness and numbness.   Hematological: Negative.    Psychiatric/Behavioral:  Positive for sleep disturbance.    All other systems reviewed and are negative.      ROS obtained by MA. Reviewed. See HPI.     Meds/Allergies    Current Outpatient Medications   Medication Sig Dispense Refill    ALPRAZolam (XANAX) 0.25 mg tablet Take 1 tablet (0.25 mg total) by mouth daily at bedtime as needed for anxiety for up to 20 doses 20 tablet 0    aspirin 81 mg chewable tablet Chew 81 mg daily      desvenlafaxine (PRISTIQ) 100 mg 24 hr tablet Take 1 tablet (100 mg total) by mouth daily 100 tablet 1    meloxicam (MOBIC) 15 mg tablet Take 1 tablet (15 mg total) by mouth daily As needed 30 tablet 2    Multiple Vitamins-Minerals (MULTIVITAMIN ADULTS 50+ PO) Take by mouth      pantoprazole (PROTONIX) 40 mg tablet TAKE 1 TABLET BY MOUTH EVERY DAY (Patient not taking: Reported on 9/27/2024) 30 tablet 1    rosuvastatin (CRESTOR) 20 MG tablet Take 1 tablet (20 mg total) by mouth daily 100 tablet 1    Saw Palmetto, Serenoa repens, (SAW PALMETTO PO) Take by mouth Unsure of dosage      tadalafil (CIALIS) 20 MG tablet Take 1 tablet (20 mg total) by mouth daily as needed for erectile dysfunction 10 tablet 1     No current  facility-administered medications for this visit.       Allergies   Allergen Reactions    Amoxicillin Itching    Lipitor [Atorvastatin]        PAST HISTORY    Past Medical History:   Diagnosis Date    Coronary artery disease     Depression     Dermoid cyst of face     resolved 2016    Hyperlipidemia     Myocardial infarction (HCC) 04/15/2016    99% RCA block    Presenile dementia, depressed type (HCC)     onst 2009, resolved 2016    Psychiatric disorder     depression       Past Surgical History:   Procedure Laterality Date    APPENDECTOMY      COLONOSCOPY N/A 2016    Procedure: COLONOSCOPY;  Surgeon: Norberto Argueta MD;  Location: [facility] GI LAB;  Service:     CORONARY ANGIOPLASTY WITH STENT PLACEMENT Right 04/15/2016    EPIDURAL BLOCK INJECTION N/A 2023    Procedure: L5 S1 LUMBAR epidural steroid injection (76199);  Surgeon: Saroj Rose DO;  Location: [facility] MAIN OR;  Service: Pain Management     ESOPHAGOGASTRODUODENOSCOPY N/A 2016    Procedure: ESOPHAGOGASTRODUODENOSCOPY (EGD);  Surgeon: Norberto Argueta MD;  Location: [facility] GI LAB;  Service:     LUMBAR EPIDURAL INJECTION N/A 2023    Procedure: L5 S1  LUMBAR epidural steroid injection ([id number]);  Surgeon: Saroj Rose DO;  Location: [facility] MAIN OR;  Service: Pain Management     UPPER GASTROINTESTINAL ENDOSCOPY         Social History     Tobacco Use    Smoking status: Former     Current packs/day: 0.00     Average packs/day: 0.5 packs/day for 15.3 years (7.6 ttl pk-yrs)     Types: Cigarettes     Start date:      Quit date: 4/15/2016     Years since quittin.4     Passive exposure: Past    Smokeless tobacco: Never   Vaping Use    Vaping status: Never Used   Substance Use Topics    Alcohol use: Yes     Comment: 2-3 beers a week per pt 24    Drug use: Yes     Frequency: 2.0 times per week     Types: Marijuana     Comment: daily        Family History   Problem Relation Age of Onset    Arthritis Mother      Hypertension Mother     Hypertension Father     Heart disease Father     Coronary artery disease Father     Depression Father     Hypertension Brother     Cancer Maternal Grandfather          Above history personally reviewed.       EXAM    Vitals:There were no vitals taken for this visit.,There is no height or weight on file to calculate BMI.     Physical Exam  Constitutional:       Appearance: He is obese.   Eyes:      Extraocular Movements: Extraocular movements intact.      Pupils: Pupils are equal, round, and reactive to light.   Pulmonary:      Effort: Pulmonary effort is normal.   Musculoskeletal:         General: Tenderness present.      Cervical back: Normal range of motion.   Neurological:      Mental Status: He is alert and oriented to person, place, and time.      Sensory: Sensory deficit present.      Motor: Weakness present.      Coordination: Finger-Nose-Finger Test normal.      Gait: Gait abnormal.      Deep Tendon Reflexes:      Reflex Scores:       Tricep reflexes are 1+ on the right side and 1+ on the left side.       Bicep reflexes are 1+ on the right side and 1+ on the left side.       Brachioradialis reflexes are 1+ on the right side and 1+ on the left side.       Patellar reflexes are 1+ on the right side and 1+ on the left side.       Achilles reflexes are 1+ on the right side and 1+ on the left side.  Psychiatric:         Speech: Speech normal.         Neurologic Exam     Mental Status   Oriented to person, place, and time.   Speech: speech is normal   Level of consciousness: alert    Cranial Nerves     CN III, IV, VI   Pupils are equal, round, and reactive to light.  Right pupil: Size: 3 mm. Shape: regular. Reactivity: brisk.   Left pupil: Size: 3 mm. Shape: regular. Reactivity: brisk.   Nystagmus: none     CN XI   CN XI normal.     Motor Exam   Muscle bulk: normal  Overall muscle tone: normal  Right arm tone: normal  Left arm tone: normal  Right arm pronator drift: absent  Left arm  pronator drift: absent  Right leg tone: normal  Left leg tone: normal    Sensory Exam   Right arm light touch: normal  Left arm light touch: normal  Right leg light touch: normal  Left leg light touch: decreased from knee  Right leg vibration: decreased from knee  Left leg vibration: decreased from knee    Gait, Coordination, and Reflexes     Coordination   Finger to nose coordination: normal    Reflexes   Right brachioradialis: 1+  Left brachioradialis: 1+  Right biceps: 1+  Left biceps: 1+  Right triceps: 1+  Left triceps: 1+  Right patellar: 1+  Left patellar: 1+  Right achilles: 1+  Left achilles: 1+  Right : 1+  Left : 1+  Right Balderas: absent  Left Balderas: absent  Right ankle clonus: absent  Left ankle clonus: absent        MEDICAL DECISION MAKING    Imaging Studies:     No results found.    Results Review Statement: I personally reviewed the following image studies in PACS and associated radiology reports: XR and MRI spine. My interpretation of the radiology images/reports is: Findings as described in the assessment section above.    Shivam Rodriguez PA-C  10/2/2024  7:59 AM  Sign when Signing Visit  Neurosurgery Office Note  Kee Bardales III 64 y.o. male MRN: [id number]      Assessment & Plan    No problem-specific Assessment & Plan notes found for this encounter.       {Assess/PlanSmartLinks:01896}      I have spent a total time of *** minutes on 10/02/24 in caring for this patient including {AMB Counseling Topics:[id number]}.      CHIEF COMPLAINT    No chief complaint on file.      HISTORY    History of Present Illness    64 y.o. year old male     HPI    See Discussion    REVIEW OF SYSTEMS    Review of Systems   Constitutional: Negative.    HENT: Negative.     Eyes: Negative.    Respiratory: Negative.     Cardiovascular: Negative.    Gastrointestinal: Negative.    Endocrine: Negative.    Genitourinary: Negative.    Musculoskeletal:  Positive for back pain, gait problem and myalgias (b/l leg  cramping).         (B) Lbp radiates to to b/l legs wrapping around the thighs x 2 yrs.   Also has pain into left groin. No inciting event  + N/T/W on BLE  and difficulty walking      Pain 7/10 Intermittent, sharp, shooting  PM 2019  Inj 6/14/23, 3/31/23   PT 2021  Aspirin/former smoker/ no previous back sx   Skin: Negative.    Allergic/Immunologic: Negative.    Neurological:  Positive for weakness and numbness.   Hematological: Negative.    Psychiatric/Behavioral:  Positive for sleep disturbance.    All other systems reviewed and are negative.      ROS obtained by MA. Reviewed. See HPI.     Meds/Allergies    Current Outpatient Medications   Medication Sig Dispense Refill    ALPRAZolam (XANAX) 0.25 mg tablet Take 1 tablet (0.25 mg total) by mouth daily at bedtime as needed for anxiety for up to 20 doses 20 tablet 0    aspirin 81 mg chewable tablet Chew 81 mg daily      desvenlafaxine (PRISTIQ) 100 mg 24 hr tablet Take 1 tablet (100 mg total) by mouth daily 100 tablet 1    meloxicam (MOBIC) 15 mg tablet Take 1 tablet (15 mg total) by mouth daily As needed 30 tablet 2    Multiple Vitamins-Minerals (MULTIVITAMIN ADULTS 50+ PO) Take by mouth      pantoprazole (PROTONIX) 40 mg tablet TAKE 1 TABLET BY MOUTH EVERY DAY (Patient not taking: Reported on 9/27/2024) 30 tablet 1    rosuvastatin (CRESTOR) 20 MG tablet Take 1 tablet (20 mg total) by mouth daily 100 tablet 1    Saw Palmetto, Serenoa repens, (SAW PALMETTO PO) Take by mouth Unsure of dosage      tadalafil (CIALIS) 20 MG tablet Take 1 tablet (20 mg total) by mouth daily as needed for erectile dysfunction 10 tablet 1     No current facility-administered medications for this visit.       Allergies   Allergen Reactions    Amoxicillin Itching    Lipitor [Atorvastatin]        PAST HISTORY    Past Medical History:   Diagnosis Date    Coronary artery disease     Depression     Dermoid cyst of face     resolved 04/19/2016    Hyperlipidemia     Myocardial infarction (HCC)  04/15/2016    99% RCA block    Presenile dementia, depressed type (HCC)     onst 2009, resolved 2016    Psychiatric disorder     depression       Past Surgical History:   Procedure Laterality Date    APPENDECTOMY      COLONOSCOPY N/A 2016    Procedure: COLONOSCOPY;  Surgeon: Norberto Argueta MD;  Location: [facility] GI LAB;  Service:     CORONARY ANGIOPLASTY WITH STENT PLACEMENT Right 04/15/2016    EPIDURAL BLOCK INJECTION N/A 2023    Procedure: L5 S1 LUMBAR epidural steroid injection ([id number]);  Surgeon: Saroj Rose DO;  Location: [facility] MAIN OR;  Service: Pain Management     ESOPHAGOGASTRODUODENOSCOPY N/A 2016    Procedure: ESOPHAGOGASTRODUODENOSCOPY (EGD);  Surgeon: Norberto Argueta MD;  Location: [facility] GI LAB;  Service:     LUMBAR EPIDURAL INJECTION N/A 2023    Procedure: L5 S1  LUMBAR epidural steroid injection ([id number]);  Surgeon: Saroj Rose DO;  Location: [facility] MAIN OR;  Service: Pain Management     UPPER GASTROINTESTINAL ENDOSCOPY         Social History     Tobacco Use    Smoking status: Former     Current packs/day: 0.00     Average packs/day: 0.5 packs/day for 15.3 years (7.6 ttl pk-yrs)     Types: Cigarettes     Start date:      Quit date: 4/15/2016     Years since quittin.4     Passive exposure: Past    Smokeless tobacco: Never   Vaping Use    Vaping status: Never Used   Substance Use Topics    Alcohol use: Yes     Comment: 2-3 beers a week per pt 24    Drug use: Yes     Frequency: 2.0 times per week     Types: Marijuana     Comment: daily        Family History   Problem Relation Age of Onset    Arthritis Mother     Hypertension Mother     Hypertension Father     Heart disease Father     Coronary artery disease Father     Depression Father     Hypertension Brother     Cancer Maternal Grandfather          Above history personally reviewed.       EXAM    Vitals:There were no vitals taken for this visit.,There is no height or weight on file to  "calculate BMI.     Physical Exam    Neurologic Exam      MEDICAL DECISION MAKING    Imaging Studies:     No results found.    {Results Review Statement:93976::\"No pertinent imaging studies reviewed.\"}  "

## 2024-10-02 NOTE — LETTER
October 2, 2024     Misael Johnna, DO  [address]  [address]  [place] [address]    Patient: Kee Bardales III   YOB: 1960   Date of Visit: 10/2/2024       Dear Dr. Oropeza:    Thank you for referring Kee Bardales to me for evaluation. Below are my notes for this consultation.    If you have questions, please do not hesitate to call me. I look forward to following your patient along with you.         Sincerely,        Shivam Rodriguez PA-C        CC: No Recipients    Shivam Rodriguez PA-C  10/2/2024  8:21 AM  Sign when Signing Visit  Neurosurgery Office Note  Kee Bardales III 64 y.o. male MRN: [id number]      Assessment & Plan    Patient is a 64 years old gentleman with past medical history of atherosclerotic heart disease status post stent placements about 5 to 6 years ago, dysphagia, venous insufficiency, arthritis, anxiety, depressive disorder, hyperlipidemia, obesity, and chronic lower back pain known to our service.  He is here today referred by his rheumatologist for evaluation of lower back pain with left leg radiculopathy.   saw the patient a year ago, and recommended conservative treatment.  Patient tried physical therapy and injections with some improvement but denies any follow-up with pain management or PT lately.  Reports his lumbar radiculopathy in the left outer calf region with numbness and also left quads weakness, gait instability and feeling dizziness, denies any neck pain, radicular symptoms in the upper extremities,   weakness or dropping objects.  No bowel or bladder issues or saddle anesthesia.  Tried chiropractics with slight improvement.  Denies taking any muscle relaxer or membrane stabilizing agents.    Exam-patient is alert and oriented x 3.  Moves all extremities.  Left quad strength is 4/5, also left flexion 4+/5, left knee extension 4/5, sensation to light touch decreased over the left outer calf region, gait unstable, that if vibration in  "bilateral tibial prominence.  DTR depressed throughout.  Negative Balderas's tests and clonus negative bilaterally.  Nontender on palpation of posterior cervical spine slight tenderness noted in the left lower paraspinal region.  Patient denies history of diabetes mellitus, hypertension, stroke, seizures, or bleeding disorder.  He takes baby aspirin.  Remote cigarette smoker drinks alcohol socially.    Imaging :  patient does not have any recent image.  MRI of lumbar spine in 2023 demonstrates multilevel degenerative disease more prominent in the lower lumbar spine at L4-5 and L5-S1 with bilateral neuroforaminal narrowing at L4-5 and moderate to severe left foraminal narrowing at L5-S1.  No significant central canal stenosis.    Hx, Exam and previous images reviewed with the patient.  Management plan discussed.  The patient has left bar radiculopathy, left leg weakness noted, additionally he has gait issues.  Unsure if this is related to his left leg weakness, for his dizziness, loss of vibration in his knees and balance problem,I would recommend follow-up with his PCP for workup, I ordered blood work for vitamin B12 level.  Referral to PT and pain management order placed.  Will see the patient once he tried conservative treatments and upon completion of his lumbar spine MRI, shared visit with spine surgeon.  Questions and concerns were answered.  He understands instructions and agreed with the plan.    Plan:  MRI of lumbar spine without contrast  Ambulatory referral to pain management  Ambulatory referral to PT  Vitamin B12 workup  Advised patient to follow-up with his PCP for gait dysfunction and dizziness workups  Prescription for Robaxin sent to his pharmacy  Follow-up after conservative treatment and with image completion, shared visit with spine surgeon  Call with question or concern.        History of Present Illness    C/C: \" 64 y.o. year old male here today referred for Lumbar raidc eval\"    HPI    See " detailed discussion above        REVIEW OF SYSTEMS    Review of Systems   Constitutional: Negative.    HENT: Negative.     Eyes: Negative.    Respiratory: Negative.     Cardiovascular: Negative.    Gastrointestinal: Negative.    Endocrine: Negative.    Genitourinary: Negative.    Musculoskeletal:  Positive for back pain, gait problem and myalgias (b/l leg cramping).         (B) Lbp radiates to to b/l legs wrapping around the thighs x 2 yrs.   Also has pain into left groin. No inciting event  + N/T/W on BLE  and difficulty walking      Pain 7/10 Intermittent, sharp, shooting  PM 2019  Inj 6/14/23, 3/31/23   PT 2021  Aspirin/former smoker/ no previous back sx   Skin: Negative.    Allergic/Immunologic: Negative.    Neurological:  Positive for weakness and numbness.   Hematological: Negative.    Psychiatric/Behavioral:  Positive for sleep disturbance.    All other systems reviewed and are negative.      ROS obtained by MA. Reviewed. See HPI.     Meds/Allergies    Current Outpatient Medications   Medication Sig Dispense Refill   • ALPRAZolam (XANAX) 0.25 mg tablet Take 1 tablet (0.25 mg total) by mouth daily at bedtime as needed for anxiety for up to 20 doses 20 tablet 0   • aspirin 81 mg chewable tablet Chew 81 mg daily     • desvenlafaxine (PRISTIQ) 100 mg 24 hr tablet Take 1 tablet (100 mg total) by mouth daily 100 tablet 1   • meloxicam (MOBIC) 15 mg tablet Take 1 tablet (15 mg total) by mouth daily As needed 30 tablet 2   • Multiple Vitamins-Minerals (MULTIVITAMIN ADULTS 50+ PO) Take by mouth     • pantoprazole (PROTONIX) 40 mg tablet TAKE 1 TABLET BY MOUTH EVERY DAY (Patient not taking: Reported on 9/27/2024) 30 tablet 1   • rosuvastatin (CRESTOR) 20 MG tablet Take 1 tablet (20 mg total) by mouth daily 100 tablet 1   • Saw Palmetto, Serenoa repens, (SAW PALMETTO PO) Take by mouth Unsure of dosage     • tadalafil (CIALIS) 20 MG tablet Take 1 tablet (20 mg total) by mouth daily as needed for erectile dysfunction 10  tablet 1     No current facility-administered medications for this visit.       Allergies   Allergen Reactions   • Amoxicillin Itching   • Lipitor [Atorvastatin]        PAST HISTORY    Past Medical History:   Diagnosis Date   • Coronary artery disease    • Depression    • Dermoid cyst of face     resolved 2016   • Hyperlipidemia    • Myocardial infarction (HCC) 04/15/2016    99% RCA block   • Presenile dementia, depressed type (HCC)     onst 2009, resolved 2016   • Psychiatric disorder     depression       Past Surgical History:   Procedure Laterality Date   • APPENDECTOMY     • COLONOSCOPY N/A 2016    Procedure: COLONOSCOPY;  Surgeon: Norberto Argueta MD;  Location: [facility] GI LAB;  Service:    • CORONARY ANGIOPLASTY WITH STENT PLACEMENT Right 04/15/2016   • EPIDURAL BLOCK INJECTION N/A 2023    Procedure: L5 S1 LUMBAR epidural steroid injection (94547);  Surgeon: Saroj Rose DO;  Location: [facility] MAIN OR;  Service: Pain Management    • ESOPHAGOGASTRODUODENOSCOPY N/A 2016    Procedure: ESOPHAGOGASTRODUODENOSCOPY (EGD);  Surgeon: Norberto Argueta MD;  Location: [facility] GI LAB;  Service:    • LUMBAR EPIDURAL INJECTION N/A 2023    Procedure: L5 S1  LUMBAR epidural steroid injection ([id number]);  Surgeon: Saroj Rose DO;  Location: [facility] MAIN OR;  Service: Pain Management    • UPPER GASTROINTESTINAL ENDOSCOPY         Social History     Tobacco Use   • Smoking status: Former     Current packs/day: 0.00     Average packs/day: 0.5 packs/day for 15.3 years (7.6 ttl pk-yrs)     Types: Cigarettes     Start date:      Quit date: 4/15/2016     Years since quittin.4     Passive exposure: Past   • Smokeless tobacco: Never   Vaping Use   • Vaping status: Never Used   Substance Use Topics   • Alcohol use: Yes     Comment: 2-3 beers a week per pt 24   • Drug use: Yes     Frequency: 2.0 times per week     Types: Marijuana     Comment: daily        Family History   Problem  Relation Age of Onset   • Arthritis Mother    • Hypertension Mother    • Hypertension Father    • Heart disease Father    • Coronary artery disease Father    • Depression Father    • Hypertension Brother    • Cancer Maternal Grandfather          Above history personally reviewed.       EXAM    Vitals:There were no vitals taken for this visit.,There is no height or weight on file to calculate BMI.     Physical Exam  Constitutional:       Appearance: He is obese.   Eyes:      Extraocular Movements: Extraocular movements intact.      Pupils: Pupils are equal, round, and reactive to light.   Pulmonary:      Effort: Pulmonary effort is normal.   Musculoskeletal:         General: Tenderness present.      Cervical back: Normal range of motion.   Neurological:      Mental Status: He is alert and oriented to person, place, and time.      Sensory: Sensory deficit present.      Motor: Weakness present.      Coordination: Finger-Nose-Finger Test normal.      Gait: Gait abnormal.      Deep Tendon Reflexes:      Reflex Scores:       Tricep reflexes are 1+ on the right side and 1+ on the left side.       Bicep reflexes are 1+ on the right side and 1+ on the left side.       Brachioradialis reflexes are 1+ on the right side and 1+ on the left side.       Patellar reflexes are 1+ on the right side and 1+ on the left side.       Achilles reflexes are 1+ on the right side and 1+ on the left side.  Psychiatric:         Speech: Speech normal.         Neurologic Exam     Mental Status   Oriented to person, place, and time.   Speech: speech is normal   Level of consciousness: alert    Cranial Nerves     CN III, IV, VI   Pupils are equal, round, and reactive to light.  Right pupil: Size: 3 mm. Shape: regular. Reactivity: brisk.   Left pupil: Size: 3 mm. Shape: regular. Reactivity: brisk.   Nystagmus: none     CN XI   CN XI normal.     Motor Exam   Muscle bulk: normal  Overall muscle tone: normal  Right arm tone: normal  Left arm tone:  normal  Right arm pronator drift: absent  Left arm pronator drift: absent  Right leg tone: normal  Left leg tone: normal    Sensory Exam   Right arm light touch: normal  Left arm light touch: normal  Right leg light touch: normal  Left leg light touch: decreased from knee  Right leg vibration: decreased from knee  Left leg vibration: decreased from knee    Gait, Coordination, and Reflexes     Coordination   Finger to nose coordination: normal    Reflexes   Right brachioradialis: 1+  Left brachioradialis: 1+  Right biceps: 1+  Left biceps: 1+  Right triceps: 1+  Left triceps: 1+  Right patellar: 1+  Left patellar: 1+  Right achilles: 1+  Left achilles: 1+  Right : 1+  Left : 1+  Right Balderas: absent  Left Balderas: absent  Right ankle clonus: absent  Left ankle clonus: absent        MEDICAL DECISION MAKING    Imaging Studies:     No results found.    Results Review Statement: I personally reviewed the following image studies in PACS and associated radiology reports: XR and MRI spine. My interpretation of the radiology images/reports is: Findings as described in the assessment section above.

## 2024-10-02 NOTE — LETTER
October 2, 2024     Misael Johnna, DO  [address]  [address]  [place] [address]    Patient: Kee Bardales III   YOB: 1960   Date of Visit: 10/2/2024       Dear Dr. Oropeza:    Thank you for referring Kee Bardales to me for evaluation. Below are my notes for this consultation.    If you have questions, please do not hesitate to call me. I look forward to following your patient along with you.         Sincerely,        Shivam Rodriguez PA-C        CC: No Recipients    Shivam Rodriguez PA-C  10/2/2024  8:18 AM  Incomplete  Neurosurgery Office Note  Kee Bardales III 64 y.o. male MRN: [id number]      Assessment & Plan    Patient is a 64 years old gentleman with past medical history of atherosclerotic heart disease status post stent placements about 5 to 6 years ago, dysphagia, venous insufficiency, arthritis, anxiety, depressive disorder, hyperlipidemia, obesity, and chronic lower back pain known to our service.  He is here today referred by his rheumatologist for evaluation of lower back pain with left leg radiculopathy.   saw the patient a year ago, and recommended conservative treatment.  Patient tried physical therapy and injections with some improvement but denies any follow-up with pain management or PT lately.  Reports his lumbar radiculopathy in the left outer calf region with numbness and also left quads weakness, gait instability and feeling dizziness, denies any neck pain, radicular symptoms in the upper extremities,   weakness or dropping objects.  No bowel or bladder issues or saddle anesthesia.  Tried chiropractics with slight improvement.  Denies taking any muscle relaxer or membrane stabilizing agents.    Exam-patient is alert and oriented x 3.  Moves all extremities.  Left quad strength is 4/5, also left flexion 4+/5, left knee extension 4/5, sensation to light touch decreased over the left outer calf region, gait unstable, that if vibration in bilateral tibial  "prominence.  DTR depressed throughout.  Negative Balderas's tests and clonus negative bilaterally.  Nontender on palpation of posterior cervical spine slight tenderness noted in the left lower paraspinal region.  Patient denies history of diabetes mellitus, hypertension, stroke, seizures, or bleeding disorder.  He takes baby aspirin.  Remote cigarette smoker drinks alcohol socially.    Imaging :  patient does not have any recent image.  MRI of lumbar spine in 2023 demonstrates multilevel degenerative disease more prominent in the lower lumbar spine at L4-5 and L5-S1 with bilateral neuroforaminal narrowing at L4-5 and moderate to severe left foraminal narrowing at L5-S1.  No significant central canal stenosis.    Hx, Exam and previous images reviewed with the patient.  Management plan discussed.  The patient has left bar radiculopathy, left leg weakness noted, additionally he has gait issues.  Unsure if this is related to his left leg weakness, for his dizziness, loss of vibration in his knees and balance problem,I would recommend follow-up with his PCP for workup, I ordered blood work for vitamin B12 level.  Referral to PT and pain management order placed.  Will see the patient once he tried conservative treatments and upon completion of his lumbar spine MRI, shared visit with spine surgeon.  Questions and concerns were answered.  He understands instructions and agreed with the plan.    Plan:  MRI of lumbar spine without contrast  Ambulatory referral to pain management  Ambulatory referral to PT  Vitamin B12 workup  Advised patient to follow-up with his PCP for gait dysfunction and dizziness workups  Prescription for Robaxin sent to his pharmacy  Follow-up after conservative treatment and with image completion, shared visit with spine surgeon  Call with question or concern.        History of Present Illness    C/C: \" 64 y.o. year old male here today referred for Lumbar raidc eval\"    HPI    See detailed discussion " above        REVIEW OF SYSTEMS    Review of Systems   Constitutional: Negative.    HENT: Negative.     Eyes: Negative.    Respiratory: Negative.     Cardiovascular: Negative.    Gastrointestinal: Negative.    Endocrine: Negative.    Genitourinary: Negative.    Musculoskeletal:  Positive for back pain, gait problem and myalgias (b/l leg cramping).         (B) Lbp radiates to to b/l legs wrapping around the thighs x 2 yrs.   Also has pain into left groin. No inciting event  + N/T/W on BLE  and difficulty walking      Pain 7/10 Intermittent, sharp, shooting  PM 2019  Inj 6/14/23, 3/31/23   PT 2021  Aspirin/former smoker/ no previous back sx   Skin: Negative.    Allergic/Immunologic: Negative.    Neurological:  Positive for weakness and numbness.   Hematological: Negative.    Psychiatric/Behavioral:  Positive for sleep disturbance.    All other systems reviewed and are negative.      ROS obtained by MA. Reviewed. See HPI.     Meds/Allergies    Current Outpatient Medications   Medication Sig Dispense Refill    ALPRAZolam (XANAX) 0.25 mg tablet Take 1 tablet (0.25 mg total) by mouth daily at bedtime as needed for anxiety for up to 20 doses 20 tablet 0    aspirin 81 mg chewable tablet Chew 81 mg daily      desvenlafaxine (PRISTIQ) 100 mg 24 hr tablet Take 1 tablet (100 mg total) by mouth daily 100 tablet 1    meloxicam (MOBIC) 15 mg tablet Take 1 tablet (15 mg total) by mouth daily As needed 30 tablet 2    Multiple Vitamins-Minerals (MULTIVITAMIN ADULTS 50+ PO) Take by mouth      pantoprazole (PROTONIX) 40 mg tablet TAKE 1 TABLET BY MOUTH EVERY DAY (Patient not taking: Reported on 9/27/2024) 30 tablet 1    rosuvastatin (CRESTOR) 20 MG tablet Take 1 tablet (20 mg total) by mouth daily 100 tablet 1    Saw Palmetto, Serenoa repens, (SAW PALMETTO PO) Take by mouth Unsure of dosage      tadalafil (CIALIS) 20 MG tablet Take 1 tablet (20 mg total) by mouth daily as needed for erectile dysfunction 10 tablet 1     No current  facility-administered medications for this visit.       Allergies   Allergen Reactions    Amoxicillin Itching    Lipitor [Atorvastatin]        PAST HISTORY    Past Medical History:   Diagnosis Date    Coronary artery disease     Depression     Dermoid cyst of face     resolved 2016    Hyperlipidemia     Myocardial infarction (HCC) 04/15/2016    99% RCA block    Presenile dementia, depressed type (HCC)     onst 2009, resolved 2016    Psychiatric disorder     depression       Past Surgical History:   Procedure Laterality Date    APPENDECTOMY      COLONOSCOPY N/A 2016    Procedure: COLONOSCOPY;  Surgeon: Norberto Argueta MD;  Location: [facility] GI LAB;  Service:     CORONARY ANGIOPLASTY WITH STENT PLACEMENT Right 04/15/2016    EPIDURAL BLOCK INJECTION N/A 2023    Procedure: L5 S1 LUMBAR epidural steroid injection (90489);  Surgeon: Saroj Rose DO;  Location: [facility] MAIN OR;  Service: Pain Management     ESOPHAGOGASTRODUODENOSCOPY N/A 2016    Procedure: ESOPHAGOGASTRODUODENOSCOPY (EGD);  Surgeon: Norberto Argueta MD;  Location: [facility] GI LAB;  Service:     LUMBAR EPIDURAL INJECTION N/A 2023    Procedure: L5 S1  LUMBAR epidural steroid injection ([id number]);  Surgeon: Saroj Rose DO;  Location: [facility] MAIN OR;  Service: Pain Management     UPPER GASTROINTESTINAL ENDOSCOPY         Social History     Tobacco Use    Smoking status: Former     Current packs/day: 0.00     Average packs/day: 0.5 packs/day for 15.3 years (7.6 ttl pk-yrs)     Types: Cigarettes     Start date:      Quit date: 4/15/2016     Years since quittin.4     Passive exposure: Past    Smokeless tobacco: Never   Vaping Use    Vaping status: Never Used   Substance Use Topics    Alcohol use: Yes     Comment: 2-3 beers a week per pt 24    Drug use: Yes     Frequency: 2.0 times per week     Types: Marijuana     Comment: daily        Family History   Problem Relation Age of Onset    Arthritis Mother      Hypertension Mother     Hypertension Father     Heart disease Father     Coronary artery disease Father     Depression Father     Hypertension Brother     Cancer Maternal Grandfather          Above history personally reviewed.       EXAM    Vitals:There were no vitals taken for this visit.,There is no height or weight on file to calculate BMI.     Physical Exam  Constitutional:       Appearance: He is obese.   Eyes:      Extraocular Movements: Extraocular movements intact.      Pupils: Pupils are equal, round, and reactive to light.   Pulmonary:      Effort: Pulmonary effort is normal.   Musculoskeletal:         General: Tenderness present.      Cervical back: Normal range of motion.   Neurological:      Mental Status: He is alert and oriented to person, place, and time.      Sensory: Sensory deficit present.      Motor: Weakness present.      Coordination: Finger-Nose-Finger Test normal.      Gait: Gait abnormal.      Deep Tendon Reflexes:      Reflex Scores:       Tricep reflexes are 1+ on the right side and 1+ on the left side.       Bicep reflexes are 1+ on the right side and 1+ on the left side.       Brachioradialis reflexes are 1+ on the right side and 1+ on the left side.       Patellar reflexes are 1+ on the right side and 1+ on the left side.       Achilles reflexes are 1+ on the right side and 1+ on the left side.  Psychiatric:         Speech: Speech normal.         Neurologic Exam     Mental Status   Oriented to person, place, and time.   Speech: speech is normal   Level of consciousness: alert    Cranial Nerves     CN III, IV, VI   Pupils are equal, round, and reactive to light.  Right pupil: Size: 3 mm. Shape: regular. Reactivity: brisk.   Left pupil: Size: 3 mm. Shape: regular. Reactivity: brisk.   Nystagmus: none     CN XI   CN XI normal.     Motor Exam   Muscle bulk: normal  Overall muscle tone: normal  Right arm tone: normal  Left arm tone: normal  Right arm pronator drift: absent  Left arm  pronator drift: absent  Right leg tone: normal  Left leg tone: normal    Sensory Exam   Right arm light touch: normal  Left arm light touch: normal  Right leg light touch: normal  Left leg light touch: decreased from knee  Right leg vibration: decreased from knee  Left leg vibration: decreased from knee    Gait, Coordination, and Reflexes     Coordination   Finger to nose coordination: normal    Reflexes   Right brachioradialis: 1+  Left brachioradialis: 1+  Right biceps: 1+  Left biceps: 1+  Right triceps: 1+  Left triceps: 1+  Right patellar: 1+  Left patellar: 1+  Right achilles: 1+  Left achilles: 1+  Right : 1+  Left : 1+  Right Balderas: absent  Left Balderas: absent  Right ankle clonus: absent  Left ankle clonus: absent        MEDICAL DECISION MAKING    Imaging Studies:     No results found.    Results Review Statement: I personally reviewed the following image studies in PACS and associated radiology reports: XR and MRI spine. My interpretation of the radiology images/reports is: Findings as described in the assessment section above.    Shivam Rodriguez PA-C  10/2/2024  7:59 AM  Sign when Signing Visit  Neurosurgery Office Note  Kee Bardales III 64 y.o. male MRN: [id number]      Assessment & Plan    No problem-specific Assessment & Plan notes found for this encounter.       {Assess/PlanSmartLinks:91843}      I have spent a total time of *** minutes on 10/02/24 in caring for this patient including {AMB Counseling Topics:[id number]}.      CHIEF COMPLAINT    No chief complaint on file.      HISTORY    History of Present Illness    64 y.o. year old male     HPI    See Discussion    REVIEW OF SYSTEMS    Review of Systems   Constitutional: Negative.    HENT: Negative.     Eyes: Negative.    Respiratory: Negative.     Cardiovascular: Negative.    Gastrointestinal: Negative.    Endocrine: Negative.    Genitourinary: Negative.    Musculoskeletal:  Positive for back pain, gait problem and myalgias (b/l leg  cramping).         (B) Lbp radiates to to b/l legs wrapping around the thighs x 2 yrs.   Also has pain into left groin. No inciting event  + N/T/W on BLE  and difficulty walking      Pain 7/10 Intermittent, sharp, shooting  PM 2019  Inj 6/14/23, 3/31/23   PT 2021  Aspirin/former smoker/ no previous back sx   Skin: Negative.    Allergic/Immunologic: Negative.    Neurological:  Positive for weakness and numbness.   Hematological: Negative.    Psychiatric/Behavioral:  Positive for sleep disturbance.    All other systems reviewed and are negative.      ROS obtained by MA. Reviewed. See HPI.     Meds/Allergies    Current Outpatient Medications   Medication Sig Dispense Refill    ALPRAZolam (XANAX) 0.25 mg tablet Take 1 tablet (0.25 mg total) by mouth daily at bedtime as needed for anxiety for up to 20 doses 20 tablet 0    aspirin 81 mg chewable tablet Chew 81 mg daily      desvenlafaxine (PRISTIQ) 100 mg 24 hr tablet Take 1 tablet (100 mg total) by mouth daily 100 tablet 1    meloxicam (MOBIC) 15 mg tablet Take 1 tablet (15 mg total) by mouth daily As needed 30 tablet 2    Multiple Vitamins-Minerals (MULTIVITAMIN ADULTS 50+ PO) Take by mouth      pantoprazole (PROTONIX) 40 mg tablet TAKE 1 TABLET BY MOUTH EVERY DAY (Patient not taking: Reported on 9/27/2024) 30 tablet 1    rosuvastatin (CRESTOR) 20 MG tablet Take 1 tablet (20 mg total) by mouth daily 100 tablet 1    Saw Palmetto, Serenoa repens, (SAW PALMETTO PO) Take by mouth Unsure of dosage      tadalafil (CIALIS) 20 MG tablet Take 1 tablet (20 mg total) by mouth daily as needed for erectile dysfunction 10 tablet 1     No current facility-administered medications for this visit.       Allergies   Allergen Reactions    Amoxicillin Itching    Lipitor [Atorvastatin]        PAST HISTORY    Past Medical History:   Diagnosis Date    Coronary artery disease     Depression     Dermoid cyst of face     resolved 04/19/2016    Hyperlipidemia     Myocardial infarction (HCC)  04/15/2016    99% RCA block    Presenile dementia, depressed type (HCC)     onst 2009, resolved 2016    Psychiatric disorder     depression       Past Surgical History:   Procedure Laterality Date    APPENDECTOMY      COLONOSCOPY N/A 2016    Procedure: COLONOSCOPY;  Surgeon: Norberto Argueta MD;  Location: [facility] GI LAB;  Service:     CORONARY ANGIOPLASTY WITH STENT PLACEMENT Right 04/15/2016    EPIDURAL BLOCK INJECTION N/A 2023    Procedure: L5 S1 LUMBAR epidural steroid injection ([id number]);  Surgeon: Saroj Rose DO;  Location: [facility] MAIN OR;  Service: Pain Management     ESOPHAGOGASTRODUODENOSCOPY N/A 2016    Procedure: ESOPHAGOGASTRODUODENOSCOPY (EGD);  Surgeon: Norberto Argueta MD;  Location: [facility] GI LAB;  Service:     LUMBAR EPIDURAL INJECTION N/A 2023    Procedure: L5 S1  LUMBAR epidural steroid injection ([id number]);  Surgeon: Saroj Rose DO;  Location: [facility] MAIN OR;  Service: Pain Management     UPPER GASTROINTESTINAL ENDOSCOPY         Social History     Tobacco Use    Smoking status: Former     Current packs/day: 0.00     Average packs/day: 0.5 packs/day for 15.3 years (7.6 ttl pk-yrs)     Types: Cigarettes     Start date:      Quit date: 4/15/2016     Years since quittin.4     Passive exposure: Past    Smokeless tobacco: Never   Vaping Use    Vaping status: Never Used   Substance Use Topics    Alcohol use: Yes     Comment: 2-3 beers a week per pt 24    Drug use: Yes     Frequency: 2.0 times per week     Types: Marijuana     Comment: daily        Family History   Problem Relation Age of Onset    Arthritis Mother     Hypertension Mother     Hypertension Father     Heart disease Father     Coronary artery disease Father     Depression Father     Hypertension Brother     Cancer Maternal Grandfather          Above history personally reviewed.       EXAM    Vitals:There were no vitals taken for this visit.,There is no height or weight on file to  "calculate BMI.     Physical Exam    Neurologic Exam      MEDICAL DECISION MAKING    Imaging Studies:     No results found.    {Results Review Statement:63712::\"No pertinent imaging studies reviewed.\"}  "

## 2024-10-07 ENCOUNTER — TELEPHONE (OUTPATIENT)
Dept: VASCULAR SURGERY | Facility: CLINIC | Age: 64
End: 2024-10-07

## 2024-10-07 NOTE — TELEPHONE ENCOUNTER
Called pt and lmom to inform pt FEY will not be in the office for their appt 10/9 due to an emergency and we are calling to reschedule. Please schedule with VS

## 2024-10-08 NOTE — TELEPHONE ENCOUNTER
Called pt and lmom to inform pt that ROB will not be in the office for their appt 10/9 due to an emergency and we need to reschedule. Advised Melva GRESHAM, pt can see AP or VS. Will need LEVDR ordered, which can be done by the AP or can wait for VS because of EVLT

## 2024-10-09 ENCOUNTER — EVALUATION (OUTPATIENT)
Dept: PHYSICAL THERAPY | Facility: CLINIC | Age: 64
End: 2024-10-09
Payer: COMMERCIAL

## 2024-10-09 DIAGNOSIS — M54.16 LUMBAR RADICULOPATHY: Primary | ICD-10-CM

## 2024-10-09 DIAGNOSIS — R26.81 GAIT INSTABILITY: ICD-10-CM

## 2024-10-09 PROCEDURE — 97163 PT EVAL HIGH COMPLEX 45 MIN: CPT | Performed by: PHYSICAL THERAPIST

## 2024-10-09 PROCEDURE — 97110 THERAPEUTIC EXERCISES: CPT | Performed by: PHYSICAL THERAPIST

## 2024-10-09 NOTE — PROGRESS NOTES
PT Evaluation     Today's date: 10/9/2024  Patient name: Kee Bardales III  : 1960  MRN: [id number]  Referring provider: Lombardi, Frank, DO  Dx:   Encounter Diagnosis     ICD-10-CM    1. Lumbar radiculopathy  M54.16 Ambulatory Referral to Physical Therapy      2. Gait instability  R26.81 Ambulatory Referral to Physical Therapy                     Assessment  Impairments: abnormal gait, abnormal or restricted ROM, activity intolerance, impaired physical strength, lacks appropriate home exercise program, pain with function, poor posture  and poor body mechanics  Symptom irritability: moderate    Assessment details: Kee Bardales III is a 64 y.o. male who presents with pain, decreased strength, decreased ROM, and ambulatory dysfunction. Due to these impairments, patient has difficulty performing ADL's, ambulation, stair negotiation, lifting/carrying, xfers. Patient's clinical presentation is consistent with their referring diagnosis of lumbar radiculopathy and unsteady gait.  Patient has been educated in home exercise program and plan of care. Patient would benefit from skilled physical therapy services to address their aforementioned functional limitations and progress towards prior level of function and independence with home exercise program.     Understanding of Dx/Px/POC: good     Prognosis: good    Goals  Short term goals to be accomplished in 3 weeks:  STG 1: Pt will demo independence with postural management  STG 2: Pt will demo I with HEP to maximize progress between therapy sessions  STG 3: Pt will demo L/S AROM < or = min loss throughout to promote improved functional mobility and body mechanics  STG 4: Pt will demo 1/2 MMT grade L LE strength ot improve safety while amb and improve xfers  STG 5: Pt will reports pain dec freq and intensity 50%      Long term goals to be accomplished in 6 weeks:  LTG 1: Pt will demo good body mech with >75% functional challenges to prevent reinjury  LTG 2: Pt  will be able to return to community ambulation pain free as per PLOF  LTG 3: Pt will demo Good strength L LE to promote carryover with body mech and posture    Plan  Patient would benefit from: PT eval and skilled physical therapy  Planned modality interventions: cryotherapy, thermotherapy: hydrocollator packs, TENS and traction    Planned therapy interventions: manual therapy, neuromuscular re-education, self care, therapeutic activities, therapeutic exercise and home exercise program    Frequency: 2x week  Duration in weeks: 6  Treatment plan discussed with: patient  Plan details:  HEP development, stretching, strengthening, A/AA/PROM, joint mobilizations, posture education, STM/MI as needed to reduce muscle tension, muscle reeducation, PLOC discussed and agreed upon with patient.          Subjective Evaluation    History of Present Illness  Mechanism of injury: Pt reports hjaving sciatica nearly 2 years ago, pretty consistently. Radiates down his L side and he feels weakness significantly and he cannot lift his leg and feels he is dragging his foot while walking. He is tripping over curbs because he cannot lift his foot enough. Pt reports he has been self stretching to try to manage his symptoms as well as core stabilization exercises. Floor xfers are extremely challenging too. He is unable to lay flat cannot reach back of head to ground. Attempted chiropractor however was told he couldn't be aligned. He is modifying activity including stair management and xfers. Pt symptoms L Low back, wrap in L groin, numbness and pain into L knee, does not notice numbness in calf as well his foot/toes. Pt did have an injection approx 2 years ago with only slight relief and has felt the same since second injection.    Symptoms aggravated by rising from a chair, prolonged sitting/driving leaves him at a deficits.   Generally pt feels better when he is up and moving.     Previously very active hiking and walking regularly.  Cycling also. He is concerned about his weight gain with reduced activity. He is not needing walking poles to walk because of balance impairment.   Quality of life: good    Pain  Current pain ratin  At best pain ratin (tightness and achey and numbness)  At worst pain rating: 10          Objective     Concurrent Complaints  Negative for disturbed sleep    Postural Observations  Seated posture: fair  Standing posture: fair      Neurological Testing     Sensation     Lumbar   Left   Intact: light touch  Paresthesia: light touch    Right   Intact: light touch    Reflexes   Left   Patellar (L4): normal (2+)  Achilles (S1): normal (2+)    Right   Patellar (L4): normal (2+)  Achilles (S1): normal (2+)    Additional Neurological Details  Toe and heel walking very dis coordinated and challenging  SLS inc sway and dec B  SL squat challenging L > R    Active Range of Motion     Lumbar   Flexion:  Restriction level: moderate  Extension: Active lumbar extension: pain into L laterla thigh.  with pain Restriction level: maximal  Left lateral flexion: Active left lumbar lateral flexion: Pain into L lateral thigh and L hip.    with pain Restriction level: maximal  Right lateral flexion:  WFL    Strength/Myotome Testing     Additional Strength Details  <3+/5 L hip MMT    General Comments:      Lumbar Comments  Function:  Xfers dec coordination   Gait: unsteady and antalgic, dec coordination, unsteady changing directions, reported limited confidence      Repeated motions testing:   Supine lying peripheralizes from L low backto L knee  Flexion rotation knees to L sustained 3 minutes C/dec/B- post test baselines neuro screen inc stability and confidence in amb           NO AUTH REQUIRED  Precautions: Standard. Fall risk due to unsteadiness      Visit 1       10/9/24                           Neuro Re-Ed                                                        Ther Ex       Flexion rotation  Supine hips at 90 deg knees to L  manually performed by PT 3 minutes      Seated thoracic rotation  To R x5                                                Ther Activity                     Gait Training                     Modalities

## 2024-10-16 ENCOUNTER — OFFICE VISIT (OUTPATIENT)
Dept: PHYSICAL THERAPY | Facility: CLINIC | Age: 64
End: 2024-10-16
Payer: COMMERCIAL

## 2024-10-16 DIAGNOSIS — R26.81 GAIT INSTABILITY: ICD-10-CM

## 2024-10-16 DIAGNOSIS — M54.16 LUMBAR RADICULOPATHY: Primary | ICD-10-CM

## 2024-10-16 PROCEDURE — 97110 THERAPEUTIC EXERCISES: CPT | Performed by: PHYSICAL THERAPIST

## 2024-10-16 NOTE — PROGRESS NOTES
"Daily Note     Today's date: 10/16/2024  Patient name: Kee Bardales III  : 1960  MRN: [id number]  Referring provider: Lombardi, Frank, DO  Dx:   Encounter Diagnosis     ICD-10-CM    1. Lumbar radiculopathy  M54.16       2. Gait instability  R26.81                      Subjective: Pt is very pleased with his overall progress he does feel much better and more optimistic however he does have ongoing numbness in L lateral thigh that comes and goes during today's session. He feels his forward trunk lean is more habitual.       Objective: See treatment diary below. Pt cont to reduce with repeated motions and postures do affect his L lateral thigh symptoms.       Assessment: Tolerated treatment well. Patient progress to L SGIS at wall to HEP and is encouraged to initiate L LE strengthening exercises. Gait deviations persists with cautiousness however much improved postural awareness and management, leaves clinic upright and ambulating fluidly.       Plan: Continue per plan of care.      NO AUTH REQUIRED  Precautions: Standard. Fall risk due to unsteadiness      Visit 1 2      10/9/24 10/16/24                          Neuro Re-Ed                                                        Ther Ex       Flexion rotation  Supine hips at 90 deg knees to L manually performed by PT 3 minutes 3' with clin OP      Seated thoracic rotation  To R x5 Rev     L SGIS  5-6x10 free standing and shoulder at wall at varying degrees of flexion progressing to upright     Step taps   8\" x15                                 Ther Activity                     Gait Training                     Modalities                          "

## 2024-10-29 ENCOUNTER — APPOINTMENT (OUTPATIENT)
Dept: LAB | Facility: CLINIC | Age: 64
End: 2024-10-29
Payer: COMMERCIAL

## 2024-10-29 DIAGNOSIS — M19.90 ARTHRITIS: ICD-10-CM

## 2024-10-29 DIAGNOSIS — R26.81 GAIT INSTABILITY: ICD-10-CM

## 2024-10-29 LAB
CRP SERPL QL: <1 MG/L
ERYTHROCYTE [SEDIMENTATION RATE] IN BLOOD: 5 MM/HOUR (ref 0–19)
VIT B12 SERPL-MCNC: 229 PG/ML (ref 180–914)

## 2024-10-29 PROCEDURE — 86140 C-REACTIVE PROTEIN: CPT

## 2024-10-29 PROCEDURE — 36415 COLL VENOUS BLD VENIPUNCTURE: CPT

## 2024-10-29 PROCEDURE — 82607 VITAMIN B-12: CPT

## 2024-10-29 PROCEDURE — 85652 RBC SED RATE AUTOMATED: CPT

## 2024-10-29 PROCEDURE — 81374 HLA I TYPING 1 ANTIGEN LR: CPT

## 2024-11-07 LAB — HLA-B27 QL NAA+PROBE: NEGATIVE

## 2024-11-20 ENCOUNTER — HOSPITAL ENCOUNTER (OUTPATIENT)
Dept: RADIOLOGY | Facility: HOSPITAL | Age: 64
Discharge: HOME | End: 2024-11-20
Payer: COMMERCIAL

## 2024-11-20 DIAGNOSIS — M54.16 LUMBAR RADICULOPATHY: ICD-10-CM

## 2024-11-20 DIAGNOSIS — R26.81 GAIT INSTABILITY: ICD-10-CM

## 2024-11-20 PROCEDURE — 72148 MRI LUMBAR SPINE W/O DYE: CPT

## 2024-12-06 ENCOUNTER — TELEPHONE (OUTPATIENT)
Dept: NEUROSURGERY | Facility: CLINIC | Age: 64
End: 2024-12-06

## 2024-12-06 ENCOUNTER — OFFICE VISIT (OUTPATIENT)
Dept: NEUROSURGERY | Facility: CLINIC | Age: 64
End: 2024-12-06
Payer: COMMERCIAL

## 2024-12-06 VITALS
HEIGHT: 71 IN | TEMPERATURE: 97.3 F | BODY MASS INDEX: 37.24 KG/M2 | SYSTOLIC BLOOD PRESSURE: 134 MMHG | OXYGEN SATURATION: 99 % | HEART RATE: 70 BPM | WEIGHT: 266 LBS | DIASTOLIC BLOOD PRESSURE: 82 MMHG

## 2024-12-06 DIAGNOSIS — M54.16 LUMBAR RADICULOPATHY: Primary | ICD-10-CM

## 2024-12-06 PROCEDURE — 99213 OFFICE O/P EST LOW 20 MIN: CPT | Performed by: PHYSICIAN ASSISTANT

## 2024-12-06 RX ORDER — METHOCARBAMOL 500 MG/1
500 TABLET, FILM COATED ORAL 3 TIMES DAILY
Qty: 30 TABLET | Refills: 0 | Status: SHIPPED | OUTPATIENT
Start: 2024-12-06

## 2024-12-06 RX ORDER — GABAPENTIN 100 MG/1
100 CAPSULE ORAL
Qty: 30 CAPSULE | Refills: 0 | Status: SHIPPED | OUTPATIENT
Start: 2024-12-06

## 2024-12-06 NOTE — TELEPHONE ENCOUNTER
Patient checked out. Scheduled EMG next available which was May. Patient will call the office to schedule follow up ALEX Langley/Goldberg once EMG is moved to a sooner date.

## 2024-12-06 NOTE — PROGRESS NOTES
Name: Kee Bardales III      : 1960      MRN: [id number]  Encounter Provider: Craig Robert Goldberg, MD  Encounter Date: 2024   Encounter department: [facility] NEUROSURGICAL [facility]  :  Assessment & Plan    Patient is a 64 years old gentleman with past medical history of atherosclerotic heart disease status post stent placements about 5 to 6 years ago, dysphagia, venous insufficiency, arthritis, anxiety, depressive disorder, hyperlipidemia, obesity, and chronic lower back pain with left LE radiculopathy.    Patient is here today to discuss his up-to-date Lumbar spine MRI results.    Images shows More or less stable Multiple level DJD, with new Right L3-4 new disk herniation.  L5-S1 Disc bulge with superimposed central disc herniation. Marked left with moderate-marked right neuroforaminal narrowing. Mild spinal canal stenosis.  Patient doing PT. He tried GLENDY in the past without much improvement. Not interested to go back for pain Mx.   He reports continuous lower back stiffness and pain radiating to left groin region,  left anterolateral thigh radiculopathy, numbness in the left outer calf region, weakness in left hip flexion, and knee extension. No B/B issues, but hunched over posture.  Exam-A&OX3, Merrick, strength LLE: Quads 3/5, Left KE 4-/5, RLE strength 5/5 in both proximal and distal muscle groups. Sensation to LT decreased in the Left outer calf    Plan:    EMG test of LLE  Prescription for Robaxin and Gabapentin sent to his pharmacy  F/U after EMG test results.  Continue PT  Offered referral to Pmx , but patient not interested ( not effective result with past GLENDY)           History of Present Illness   See discussion above    ROS personally reviewed and updated as follows:     I have personally reviewed the MA's review of systems and made changes as necessary.    ROS:  HEENT-No headache, vision issues  Lungs-No cough , fever, chills, rigors, or chest  "pain  Heart-Negative  Abdomen-Negative  GUT-Negative  Neurology-Numbness and weakness in the left leg.  Musculoskeletal-complains lower back pain with left groin radiation, left leg weakness, and numbness.     Objective     /82 (BP Location: Left arm, Patient Position: Sitting, Cuff Size: Adult)   Pulse 70   Temp (!) 97.3 °F (36.3 °C) (Temporal)   Ht 5' 10.5\" (1.791 m)   Wt 121 kg (266 lb)   SpO2 99%   BMI 37.63 kg/m²     Physical Exam  Constitutional:       Appearance: He is obese.   HENT:      Head: Normocephalic and atraumatic.   Eyes:      General: Lids are normal.      Extraocular Movements: Extraocular movements intact.      Pupils: Pupils are equal, round, and reactive to light.   Pulmonary:      Effort: Pulmonary effort is normal.   Musculoskeletal:         General: Tenderness present.      Cervical back: Normal range of motion.   Neurological:      Mental Status: He is oriented to person, place, and time.      Sensory: Sensory deficit present.      Motor: Weakness present.      Gait: Gait abnormal.   Psychiatric:         Speech: Speech normal.   Neurological Exam  Mental Status  Awake, alert and oriented to person, place and time. Oriented to person, place, time and situation. Oriented to person, place, and time. Recent and remote memory are intact. Speech is normal. Language is fluent with no aphasia. Attention and concentration are normal. Fund of knowledge is appropriate for level of education.    Cranial Nerves  CN II: Visual acuity is normal. Visual fields full to confrontation.  CN III, IV, VI: Extraocular movements intact bilaterally. Normal lids and orbits bilaterally. Pupils equal round and reactive to light bilaterally.  CN V: Facial sensation is normal.  CN VII: Full and symmetric facial movement.  CN VIII: Hearing is normal.  CN IX, X: Palate elevates symmetrically. Normal gag reflex.  CN XI: Shoulder shrug strength is normal.  CN XII: Tongue midline without atrophy or " fasciculations.    Motor  Normal muscle bulk throughout. Strength is 5/5 in all four extremities except as noted.  Left quads, left knee extension weaknesses.    Sensory  Light touch abnormality:   Left outer calf decreased LT sensation.    Gait   Abnormal gait.    Radiology Results Review: I personally reviewed the following image studies in PACS and associated radiology reports: MRI spine. My interpretation of the radiology images/reports is: I reviewed MRI of Lumbar spine with the patient and findings as described in the assessment section above.    Administrative Statements   I have spent a total time of 30 minutes in caring for this patient on the day of the visit/encounter including Diagnostic results, Prognosis, Risks and benefits of tx options, Instructions for management, Patient and family education, Importance of tx compliance, Risk factor reductions, Impressions, Documenting in the medical record, Reviewing / ordering tests, medicine, procedures  , and Obtaining or reviewing history  .

## 2024-12-09 DIAGNOSIS — F41.9 ANXIETY: ICD-10-CM

## 2024-12-10 RX ORDER — ALPRAZOLAM 0.25 MG/1
0.25 TABLET ORAL
Qty: 20 TABLET | Refills: 0 | Status: SHIPPED | OUTPATIENT
Start: 2024-12-10

## 2024-12-24 ENCOUNTER — HOSPITAL ENCOUNTER (OUTPATIENT)
Dept: RADIOLOGY | Facility: HOSPITAL | Age: 64
Discharge: HOME | End: 2024-12-24
Payer: COMMERCIAL

## 2024-12-24 DIAGNOSIS — M54.16 LUMBAR RADICULOPATHY: ICD-10-CM

## 2024-12-24 DIAGNOSIS — M19.90 ARTHRITIS: ICD-10-CM

## 2024-12-24 PROCEDURE — 73130 X-RAY EXAM OF HAND: CPT

## 2024-12-24 PROCEDURE — 76882 US LMTD JT/FCL EVL NVASC XTR: CPT

## 2024-12-24 PROCEDURE — 73100 X-RAY EXAM OF WRIST: CPT

## 2024-12-26 ENCOUNTER — RESULTS FOLLOW-UP (OUTPATIENT)
Dept: RHEUMATOLOGY | Facility: CLINIC | Age: 64
End: 2024-12-26

## 2025-01-02 ENCOUNTER — TELEPHONE (OUTPATIENT)
Dept: NEUROSURGERY | Facility: CLINIC | Age: 65
End: 2025-01-02

## 2025-01-02 NOTE — TELEPHONE ENCOUNTER
12/18/24 Message received from Mary Ellen in the emg dept that she Left v-message for Kee Bardales to call me back.

## 2025-01-29 ENCOUNTER — OFFICE VISIT (OUTPATIENT)
Dept: CARDIOLOGY CLINIC | Facility: CLINIC | Age: 65
End: 2025-01-29
Payer: COMMERCIAL

## 2025-01-29 VITALS
SYSTOLIC BLOOD PRESSURE: 116 MMHG | BODY MASS INDEX: 36.26 KG/M2 | HEIGHT: 71 IN | HEART RATE: 64 BPM | OXYGEN SATURATION: 97 % | WEIGHT: 259 LBS | DIASTOLIC BLOOD PRESSURE: 70 MMHG

## 2025-01-29 DIAGNOSIS — I25.10 ARTERIOSCLEROTIC HEART DISEASE: ICD-10-CM

## 2025-01-29 DIAGNOSIS — E78.2 MIXED HYPERLIPIDEMIA: Primary | ICD-10-CM

## 2025-01-29 DIAGNOSIS — I25.2 HISTORY OF ST ELEVATION MYOCARDIAL INFARCTION (STEMI): ICD-10-CM

## 2025-01-29 PROCEDURE — 93000 ELECTROCARDIOGRAM COMPLETE: CPT | Performed by: INTERNAL MEDICINE

## 2025-01-29 PROCEDURE — 99214 OFFICE O/P EST MOD 30 MIN: CPT | Performed by: INTERNAL MEDICINE

## 2025-01-29 NOTE — PROGRESS NOTES
Cardiology   Tony Rayo DO, [facility]  Kev Simpson MD, [facility]  Gavin Bolivar MD, [facility]  Merlin Rodriguez MD, [facility]  -------------------------------------------------------------------  [facility]  [address], [address], [address]  [place], [place], [address]  [phone]    Cardiology Follow Up  Kee Bardales III  1960  [id number]          Assessment/Plan:    1. Arteriosclerotic heart disease    - He is feeling well without any symptoms - continue current Rx.  - Continue ASA  - Continue Crestor   - POCT ECG    2. Mixed hyperlipidemia (Primary)  - Lipid panel reviewed   - POCT ECG    3. History of ST elevation myocardial infarction (STEMI)  - No angina.    - Discussed diet and exercise in detail    Interval History:     Kee Bardales III is 64 y.o. male here for followup of CAD.  Since his last visit, he has been feeling well.  he denies any palpitations, chest pain, shortness of breath, LE edema, orthopnea or PND.   Diet is overall unchanged.  There has not been a significant change in weight.  He has back pain which has limited activity.       His last blood work done on 6//24 - LDL was 47 mg/dL.  Renal and liver function were normal.      Currently he is using rosuvastatin 20 mg daily and aspirin 81 mg daily.    The patient has a history of CAD.  In April of 2016, he underwent PCI to a 99% mRCA lesion after a STEMI. He was driving and felt tightness in the center part of his chest associated with diaphoresis.       Past Medical History:   Diagnosis Date    Coronary artery disease     Depression     Dermoid cyst of face     resolved 04/19/2016    Hyperlipidemia     Myocardial infarction (HCC) 04/15/2016    99% RCA block    Presenile dementia, depressed type (HCC)     onst 11/03/2009, resolved 07/21/2016    Psychiatric disorder     depression     Social History     Socioeconomic History    Marital status: /Civil Union     Spouse name: Not on file     Number of children: Not on file    Years of education: Not on file    Highest education level: Not on file   Occupational History    Not on file   Tobacco Use    Smoking status: Former     Current packs/day: 0.00     Average packs/day: 0.5 packs/day for 15.3 years (7.6 ttl pk-yrs)     Types: Cigarettes     Start date:      Quit date: 4/15/2016     Years since quittin.7     Passive exposure: Past    Smokeless tobacco: Never   Vaping Use    Vaping status: Never Used   Substance and Sexual Activity    Alcohol use: Yes     Comment: 2-3 beers a week per pt 24    Drug use: Yes     Frequency: 2.0 times per week     Types: Marijuana     Comment: daily     Sexual activity: Not on file   Other Topics Concern    Not on file   Social History Narrative    Lack of exercise    Pets/animals: Dog    Sleeps 8-10 hours a day             Social Drivers of Health     Financial Resource Strain: Not on file   Food Insecurity: Not on file   Transportation Needs: Not on file   Physical Activity: Not on file   Stress: Not on file   Social Connections: Not on file   Intimate Partner Violence: Not on file   Housing Stability: Not on file      Family History   Problem Relation Age of Onset    Arthritis Mother     Hypertension Mother     Hypertension Father     Heart disease Father     Coronary artery disease Father     Depression Father     Hypertension Brother     Cancer Maternal Grandfather      Past Surgical History:   Procedure Laterality Date    APPENDECTOMY      COLONOSCOPY N/A 2016    Procedure: COLONOSCOPY;  Surgeon: Norberto Argueta MD;  Location: [facility] GI LAB;  Service:     CORONARY ANGIOPLASTY WITH STENT PLACEMENT Right 04/15/2016    EPIDURAL BLOCK INJECTION N/A 2023    Procedure: L5 S1 LUMBAR epidural steroid injection (44459);  Surgeon: Saroj Rose DO;  Location: [facility] MAIN OR;  Service: Pain Management     ESOPHAGOGASTRODUODENOSCOPY N/A 2016    Procedure: ESOPHAGOGASTRODUODENOSCOPY (EGD);  " Surgeon: Norberto Argueta MD;  Location: [facility] GI LAB;  Service:     LUMBAR EPIDURAL INJECTION N/A 06/14/2023    Procedure: L5 S1  LUMBAR epidural steroid injection (99621);  Surgeon: Saroj Rose DO;  Location: [facility] MAIN OR;  Service: Pain Management     UPPER GASTROINTESTINAL ENDOSCOPY         Current Outpatient Medications:     ALPRAZolam (XANAX) 0.25 mg tablet, Take 1 tablet (0.25 mg total) by mouth daily at bedtime as needed for anxiety for up to 20 doses, Disp: 20 tablet, Rfl: 0    aspirin 81 mg chewable tablet, Chew 81 mg daily, Disp: , Rfl:     desvenlafaxine (PRISTIQ) 100 mg 24 hr tablet, Take 1 tablet (100 mg total) by mouth daily, Disp: 100 tablet, Rfl: 1    gabapentin (Neurontin) 100 mg capsule, Take 1 capsule (100 mg total) by mouth daily at bedtime, Disp: 30 capsule, Rfl: 0    methocarbamol (ROBAXIN) 500 mg tablet, Take 1 tablet (500 mg total) by mouth 3 (three) times a day, Disp: 30 tablet, Rfl: 0    Multiple Vitamins-Minerals (MULTIVITAMIN ADULTS 50+ PO), Take by mouth, Disp: , Rfl:     pantoprazole (PROTONIX) 40 mg tablet, TAKE 1 TABLET BY MOUTH EVERY DAY (Patient taking differently: Take 40 mg by mouth daily PRN), Disp: 30 tablet, Rfl: 1    rosuvastatin (CRESTOR) 20 MG tablet, Take 1 tablet (20 mg total) by mouth daily, Disp: 100 tablet, Rfl: 1    tadalafil (CIALIS) 20 MG tablet, Take 1 tablet (20 mg total) by mouth daily as needed for erectile dysfunction, Disp: 10 tablet, Rfl: 1        Review of Systems:  Review of Systems   Respiratory:  Negative for shortness of breath.    Cardiovascular:  Negative for chest pain, palpitations and leg swelling.   Musculoskeletal:  Positive for arthralgias and back pain.   All other systems reviewed and are negative.        Physical Exam:  Vitals:  Vitals:    01/29/25 0808   BP: 116/70   BP Location: Right arm   Patient Position: Sitting   Cuff Size: Standard   Pulse: 64   SpO2: 97%   Weight: 117 kg (259 lb)   Height: 5' 10.5\" (1.791 m)     Physical " Exam   Constitutional: He appears healthy. No distress.   Eyes: Pupils are equal, round, and reactive to light. Conjunctivae are normal.   Neck: No JVD present.   Cardiovascular: Normal rate, regular rhythm and normal heart sounds. Exam reveals no gallop and no friction rub.   No murmur heard.  Pulmonary/Chest: Effort normal and breath sounds normal. He has no wheezes. He has no rales.   Musculoskeletal:         General: No tenderness, deformity or edema.      Cervical back: Normal range of motion and neck supple.   Neurological: He is alert and oriented to person, place, and time.   Skin: Skin is warm and dry.        Cardiographics:  EKG: Personally reviewed NSR at 65 bpm  Last known EF: 60% (2017)    This note was completed in part utilizing M-Modal Fluency Direct Software.  Grammatical errors, random word insertions, spelling mistakes, and incomplete sentences can be an occasional consequence of this system secondary to software limitations, ambient noise, and hardware issues.  If you have any questions or concerns about the content, text, or information contained within the body of this dictation, please contact the provider for clarification.

## 2025-04-01 ENCOUNTER — OFFICE VISIT (OUTPATIENT)
Dept: RHEUMATOLOGY | Facility: CLINIC | Age: 65
End: 2025-04-01
Payer: COMMERCIAL

## 2025-04-01 VITALS
SYSTOLIC BLOOD PRESSURE: 134 MMHG | HEART RATE: 67 BPM | OXYGEN SATURATION: 98 % | BODY MASS INDEX: 38.19 KG/M2 | DIASTOLIC BLOOD PRESSURE: 90 MMHG | HEIGHT: 71 IN | WEIGHT: 272.8 LBS

## 2025-04-01 DIAGNOSIS — M15.0 PRIMARY GENERALIZED (OSTEO)ARTHRITIS: Primary | ICD-10-CM

## 2025-04-01 PROCEDURE — 99214 OFFICE O/P EST MOD 30 MIN: CPT | Performed by: STUDENT IN AN ORGANIZED HEALTH CARE EDUCATION/TRAINING PROGRAM

## 2025-04-01 NOTE — PROGRESS NOTES
"Name: Kee Bardales III      : 1960      MRN: [id number]  Encounter Provider: Misael Oropeza DO  Encounter Date: 2025   Encounter department: [facility]  :  Assessment & Plan  Primary generalized (osteo)arthritis  Age of onset + neg HLA-B27 + unremarkable inflammatory markers + imaging including ultrasound with no evidence for an active or chronic inflammatory arthritis - no suspicion for a rheumatologic condition causing symptoms. All consistent with osteoarthritis + lumbar radiculopathy    Discussed that his leg weakness is most likely from his back; zero suspicion for an inflammatory myositis causing his symptoms particularly with unilaterality and unremarkable inflammatory markers    Overall no suspicion for a rheumatologic condition    Patient possibly interested in hand injections, referral to Ortho Hand written  Orders:    Ambulatory Referral to Orthopedic Surgery; Future      No rheumatology follow-up needed, follow up with PCP/Neurosurgery +/- Ortho Hand    History of Present Illness     Still having a lot of hip weakness, radiating pain from the hip into the groin.    Permanent history: First saw me Sep 2024 for hand pain and back pain with radiculopathy. Hand symptoms were more consistent with osteoarthritis, but with some questionable inflammatory symptoms worked up further.     Objective   /90   Pulse 67   Ht 5' 10.5\" (1.791 m)   Wt 124 kg (272 lb 12.8 oz)   SpO2 98%   BMI 38.59 kg/m²      General: Well appearing, in no distress.   Eyes: Sclera non-icteric. EOMI  Extremities: Warm, well perfused, no edema.   Neuro: Alert and oriented. No gross focal neurological deficits.   Skin: No rashes.  MSK exam: significant degen changes bilateral hands. Tenderness to palpation L 1st CMC. No synovitis     Latest Reference Range & Units 10/29/24 11:32   Sed Rate 0 - 19 mm/hour 5   HLA B27  Negative   C-REACTIVE PROTEIN <3.0 mg/L <1.0     Reviewed results " of x-rays and ultrasound ordered at last visit    I have independently reviewed the following labs/images and interpret them as follows.  X-rays bilateral hands and wrists Dec 2024: in addition to noted findings, no evidence for inflammatory damage such as periarticular erosions, juxtaarticular new bone formation, periarticular osteopenia. No appreciated significant chondrocalcinosis. Significant bilateral 1st CMC OA

## 2025-04-01 NOTE — PATIENT INSTRUCTIONS
Fortunately, your joint symptoms are not consistent with any underlying autoimmune condition such as rheumatoid arthritis. Your symptoms, blood work and imaging are all suggestive of wear-and-tear osteoarthritis. Because this is not an autoimmune issue, you do not need to be on medications that suppress your immune system, and you do not need to continue following with a rheumatologist, as we do not manage osteoarthritis long-term.    I have written a referral to Orthopaedics to discuss possible interventions for your hand arthritis such as injections.    Chronic, systemic inflammation is a serious health concern that can be made worse -- or better -- with diet. Here’s what chronic inflammation is, and how what you eat can keep you safer from the chronic diseases inflammation can cause.    What is inflammation?  If you’ve ever cut your finger, bruised a toe or had a throat infection, you have likely experienced at least some of the four signs of inflammation: redness, swelling, pain and heat. Acute inflammation is your body’s natural response to illness, injury or infection and usually resolves on its own.  But there is another kind of inflammation ? the kind that affects the whole body ? which is called systemic. Systemic inflammation can become chronic; it can persist for months, or even years.  Chronic, systemic inflammation is a factor in diseases such as:  Obesity  Metabolic syndrome  Prediabetes  Type 2 diabetes  Heart disease  Inflammatory bowel disease, including Crohn’s disease and ulcerative colitis  Some forms of cancer  Arthritis  Alzheimer’s disease    Inflammatory Foods  What causes inflammation? It can result from exposure to environmental toxins, a lingering virus, aging or chronic stress. But what you eat contributes, too.  What foods cause inflammation? Unfortunately, a lot of them. In particular, experts recommend avoiding these inflammatory foods:  Red meat, such as steak and  hamburgers  Processed meat, such as bologna, moore, sausage and lunchmeat  Commercial baked goods such as snack cakes, pies, cookies and brownies  Bread and pasta made with white flour  Deep fried items such as French fries, fried chicken and donuts  Foods high in added sugar, such as candy, jelly and syrup  Sugar-sweetened beverages such as soda, bottled or canned tea drinks, and sports drinks  Trans fats, found in margarine, microwave popcorn, refrigerated biscuits and dough, and nondairy coffee creamers    Cooking methods can make a difference  When you want to reduce inflammation, baking, steaming or fast stir-frying are preferable to deep frying or grilling.  Cooking meat, especially red meat, on the grill creates compounds associated with cancer. Meat on the grill can drip fat onto the flames and release these compounds, which can end up in the food on your plate. The same is not true of grilled vegetables or low-fat fish, which are safe (and delicious) on the grill.  And don’t feel bad about resorting to the microwave when you’re short on time. Microwaving cooks and heats by activating water molecules, and it is actually healthier than frying or grilling at high heat.  Also, bear in mind that the benefit of healthy items like fish and vegetables can be reversed if you are not careful with sauces and dressings. Many of these condiments and extras are high in inflammatory ingredients such as sugar and trans fat as well as sodium.    Processed Foods: Read the Label  It’s important to check the ingredients list of prepared or processed foods. Many prepared foods contain hidden sugar. It can be hard to tell, since sugar tends to assume aliases: By some estimates, there are over 50 names for added sugar in prepared commercial foods, such as “cane crystals” and “crystalized cane juice,” syrups and many ingredient names that ends in “ose” (chemical shorthand for sugar).  Reading food labels is important. For  instance, any food that lists partially hydrogenated oils as an ingredient should be avoided since these are trans fats.    Anti-inflammatory Foods  While there isn’t one specific anti-inflammatory diet, experts say overall healthy eating patterns can help you get rid of inflammation and stay healthier. In particular, they recommend some food types that can help bring inflammation down:    Omega-3 Fatty Acids  One form of these powerful inflammation fighters is found in fatty fish such as salmon, herring, mackerel, sardines, tuna, striped bass and anchovies. You can get the benefit from eating the fish or by taking fish oil supplements.  Vegetarians and vegans have options, too. Another form of omega-3 is plant based. Eating nuts and seeds and cooking with canola oil can supply these nutrients and vitamin E, another inflammation fighter.    Vitamin C  Ascorbic acid, also known as vitamin C, is a powerful antioxidant. Antioxidants help address cellular wear and tear that can set off inflammation.  You’ll find vitamin C in fruits and vegetables, which are the basis of a healthy diet. In addition to well-known vitamin C sources such as citrus fruit and juice, tasty bell peppers are also packed with the vitamin and may yield fewer calories.    Polyphenols  There is a reason why the Mediterranean diet and other healthy eating plans stress colorful, plant-based foods, whole grains and olive oil.  Polyphenols are naturally occurring compounds in these foods that protect the body from inflammation.  And you can get polyphenol power from your favorite pick-me-up: Coffee, tea and even dark chocolate are rich in these beneficial compounds.    Gut-Healthy Foods  A healthy population of beneficial bacteria (denise) in the intestines can help keep inflammation at bay. To cultivate healthy intestinal denise, enjoy plenty of foods rich in probiotics and prebiotics.  But keep in mind: Not all fermented foods have probiotics. Check the  label and ensure that live microorganisms are listed in the ingredients. Yogurt and cottage cheese will have live active cultures noted on the packaging.  Prebiotics, in the form of dietary fiber, are like food for the helpful bacteria. [place] artichokes and foods rich in inulin, such as asparagus, bananas and chicory, help keep the beneficial denise healthy and plentiful.    How to Reduce Inflammation: One Meal at a Time  In terms of well-known diets, the Mediterranean diet may be the most beneficial in helping people get inflammation under control. It emphasizes omega-3s, vitamin C, polyphenols, fiber-rich foods and other known inflammation fighters.  For those who want to start gradually, one strategy is substitution: finding alternatives to foods that cause inflammation. For example:    Instead of... Try:   Charcuterie boards Vegetable slices with hummus   French fries  Baked sweet potatoes   Sauces with butter or cheese  Olive oil, vinegar and herbs   Grilled burgers Grilled eggplant or portobello mushrooms   Bakery cakes and pies Dark chocolate with raspberries or grilled peaches     Inflammation: You Can Fight It  It can seem challenging at first to eat for lower inflammation with so many inflammatory foods commercially available, but over time, small changes can turn into lasting habits.  While no one food reduces inflammation, building a healthy, holistic dietary pattern can help lower your risk of inflammatory disease and transform your health.    Copyright [facility], accessed 6/28/2024

## 2025-04-09 ENCOUNTER — OFFICE VISIT (OUTPATIENT)
Dept: OBGYN CLINIC | Facility: CLINIC | Age: 65
End: 2025-04-09
Payer: COMMERCIAL

## 2025-04-09 VITALS — WEIGHT: 272 LBS | HEIGHT: 71 IN | BODY MASS INDEX: 38.08 KG/M2

## 2025-04-09 DIAGNOSIS — M19.041 ARTHRITIS OF BOTH HANDS: ICD-10-CM

## 2025-04-09 DIAGNOSIS — M18.12 ARTHRITIS OF CARPOMETACARPAL (CMC) JOINT OF LEFT THUMB: Primary | ICD-10-CM

## 2025-04-09 DIAGNOSIS — M19.042 ARTHRITIS OF BOTH HANDS: ICD-10-CM

## 2025-04-09 PROCEDURE — 99243 OFF/OP CNSLTJ NEW/EST LOW 30: CPT | Performed by: SURGERY

## 2025-04-09 PROCEDURE — 20600 DRAIN/INJ JOINT/BURSA W/O US: CPT | Performed by: SURGERY

## 2025-04-09 RX ORDER — TRIAMCINOLONE ACETONIDE 40 MG/ML
20 INJECTION, SUSPENSION INTRA-ARTICULAR; INTRAMUSCULAR
Status: COMPLETED | OUTPATIENT
Start: 2025-04-09 | End: 2025-04-09

## 2025-04-09 RX ORDER — LIDOCAINE HYDROCHLORIDE 10 MG/ML
1 INJECTION, SOLUTION INFILTRATION; PERINEURAL
Status: COMPLETED | OUTPATIENT
Start: 2025-04-09 | End: 2025-04-09

## 2025-04-09 RX ORDER — BUPIVACAINE HYDROCHLORIDE 2.5 MG/ML
0.5 INJECTION, SOLUTION INFILTRATION; PERINEURAL
Status: COMPLETED | OUTPATIENT
Start: 2025-04-09 | End: 2025-04-09

## 2025-04-09 RX ADMIN — LIDOCAINE HYDROCHLORIDE 1 ML: 10 INJECTION, SOLUTION INFILTRATION; PERINEURAL at 09:00

## 2025-04-09 RX ADMIN — TRIAMCINOLONE ACETONIDE 20 MG: 40 INJECTION, SUSPENSION INTRA-ARTICULAR; INTRAMUSCULAR at 09:00

## 2025-04-09 RX ADMIN — BUPIVACAINE HYDROCHLORIDE 0.5 ML: 2.5 INJECTION, SOLUTION INFILTRATION; PERINEURAL at 09:00

## 2025-04-09 NOTE — PROGRESS NOTES
Michelle Nava M.D.  Attending, Orthopaedic Surgery  Hand, Wrist, and Elbow Surgery  [facility]      ORTHOPAEDIC HAND, WRIST, AND ELBOW OFFICE  VISIT       ASSESSMENT/PLAN:        Assessment & Plan  Arthritis of carpometacarpal (CMC) joint of left thumb  X-rays were reviewed in the office today. Treatment options were discussed in the form of heat, topical creams, and possible steroid injections. I discussed we can consider surgical intervention in the future if he fails all non operative treatment options. The patient was instructed to get heated arthritis gloves OTC. He may use Voltaren Gel. He consented and underwent a left thumb CMC injection in the office today without any complications. He is aware we can repeat these injections every 3 months if needed.  Orders:    Ambulatory Referral to Orthopedic Surgery    Small joint arthrocentesis: L thumb CMC    Arthritis of both hands  The patient consented and underwent a left index DIP joint injection in the office today without any complications. He is aware we can reelat this injection every 3 months if needed.  Orders:    Small joint arthrocentesis: L index DIP      The patient verbalized understanding of exam findings and treatment plan. We engaged in the shared decision-making process and treatment options were discussed at length with the patient. Surgical and conservative management discussed today along with risks and benefits.    Diagnoses and all orders for this visit:    Arthritis of carpometacarpal (CMC) joint of left thumb  -     Ambulatory Referral to Orthopedic Surgery  -     Small joint arthrocentesis: L thumb CMC    Arthritis of both hands  -     Small joint arthrocentesis: L index DIP        Follow Up:  No follow-ups on file.    To Do Next Visit:  Re-evaluation of current issue      General Discussions:  CMC Arthritis: The anatomy and physiology of carpometacarpal joint arthritis was discussed with the patient today in the  office.  Deterioration of the articular cartilage eventually leads to hypermobility at the thumb CMC joint, resulting in joint subluxation, osteophyte formation, cystic changes within the trapezium and base of the first metacarpal, as well as subchondral sclerosis.  Eventually, pain, limited mobility, and compensatory hyperextension at the metacarpophalangeal joint may develop.  While normal activity and usage of the thumb joint may provide a painful experience to the patient, this typically does not result in damage to the thumb or hand.  Treatment options include resting thumb spica splints to decreased joint edema, pain, and inflammation.  Therapy exercises to strengthen the thenar musculature may relieve pain, but do not alter the overall continued development of osteoarthritis.  Oral medications, topical medications, corticosteroid injections may decrease pain and increase overall function.  Eventually, approximately 5% of patients may require surgical intervention.                                                                                                                                                                                                     ____________________________________________________________________________________________________________________________________________      CHIEF COMPLAINT:  Chief Complaint   Patient presents with    Right Hand - Pain    Left Hand - Pain       SUBJECTIVE:  Kee Bardales III is a 64 y.o. year old RHD male who presents to the office today for evaluation of bilateral hand pain. He states his left is worse than his right. He notes pain to the base of his left thumb and bilateral index DIP joints. He notes increased pain with  and holding objects. The patient was evaluated by Rheumatology and work up was negative. He has been using gloves. He has not been taking anything OTC for pain. He has tried topical creams which help some. He denies prior  surgery.      Pain/symptom timing:  Worse during the day when active  Pain/symptom context:  Worse with activites and work  Pain/symptom modifying factors:  Rest makes better, activities make worse  Pain/symptom associated signs/symptoms: none    Prior treatment   NSAIDsNo   Injections No   Bracing/Orthotics No    Physical Therapy No     I have personally reviewed all the relevant PMH, PSH, SH, FH, Medications and allergies      PAST MEDICAL HISTORY:  Past Medical History:   Diagnosis Date    Coronary artery disease     Depression     Dermoid cyst of face     resolved 04/19/2016    Hyperlipidemia     Myocardial infarction (HCC) 04/15/2016    99% RCA block    Presenile dementia, depressed type (HCC)     onst 11/03/2009, resolved 07/21/2016    Psychiatric disorder     depression       PAST SURGICAL HISTORY:  Past Surgical History:   Procedure Laterality Date    APPENDECTOMY      COLONOSCOPY N/A 12/14/2016    Procedure: COLONOSCOPY;  Surgeon: Norberto Argueta MD;  Location: [facility] GI LAB;  Service:     CORONARY ANGIOPLASTY WITH STENT PLACEMENT Right 04/15/2016    EPIDURAL BLOCK INJECTION N/A 03/31/2023    Procedure: L5 S1 LUMBAR epidural steroid injection (80885);  Surgeon: Saroj Rose DO;  Location: [facility] MAIN OR;  Service: Pain Management     ESOPHAGOGASTRODUODENOSCOPY N/A 12/14/2016    Procedure: ESOPHAGOGASTRODUODENOSCOPY (EGD);  Surgeon: Norberto Argueta MD;  Location: [facility] GI LAB;  Service:     LUMBAR EPIDURAL INJECTION N/A 06/14/2023    Procedure: L5 S1  LUMBAR epidural steroid injection ([id number]);  Surgeon: Saroj Rose DO;  Location: [facility] MAIN OR;  Service: Pain Management     UPPER GASTROINTESTINAL ENDOSCOPY         FAMILY HISTORY:  Family History   Problem Relation Age of Onset    Arthritis Mother     Hypertension Mother     Hypertension Father     Heart disease Father     Coronary artery disease Father     Depression Father     Hypertension Brother     Cancer Maternal Grandfather        SOCIAL  HISTORY:  Social History     Tobacco Use    Smoking status: Former     Current packs/day: 0.00     Average packs/day: 0.5 packs/day for 15.3 years (7.6 ttl pk-yrs)     Types: Cigarettes     Start date:      Quit date: 4/15/2016     Years since quittin.9     Passive exposure: Past    Smokeless tobacco: Never   Vaping Use    Vaping status: Never Used   Substance Use Topics    Alcohol use: Yes     Comment: 2-3 beers a week per pt 24    Drug use: Yes     Frequency: 2.0 times per week     Types: Marijuana     Comment: daily        MEDICATIONS:    Current Outpatient Medications:     ALPRAZolam (XANAX) 0.25 mg tablet, Take 1 tablet (0.25 mg total) by mouth daily at bedtime as needed for anxiety for up to 20 doses, Disp: 20 tablet, Rfl: 0    aspirin 81 mg chewable tablet, Chew 81 mg daily, Disp: , Rfl:     desvenlafaxine (PRISTIQ) 100 mg 24 hr tablet, Take 1 tablet (100 mg total) by mouth daily, Disp: 100 tablet, Rfl: 1    methocarbamol (ROBAXIN) 500 mg tablet, Take 1 tablet (500 mg total) by mouth 3 (three) times a day, Disp: 30 tablet, Rfl: 0    Multiple Vitamins-Minerals (MULTIVITAMIN ADULTS 50+ PO), Take by mouth, Disp: , Rfl:     rosuvastatin (CRESTOR) 20 MG tablet, Take 1 tablet (20 mg total) by mouth daily, Disp: 100 tablet, Rfl: 1    tadalafil (CIALIS) 20 MG tablet, Take 1 tablet (20 mg total) by mouth daily as needed for erectile dysfunction, Disp: 10 tablet, Rfl: 1    gabapentin (Neurontin) 100 mg capsule, Take 1 capsule (100 mg total) by mouth daily at bedtime, Disp: 30 capsule, Rfl: 0    pantoprazole (PROTONIX) 40 mg tablet, TAKE 1 TABLET BY MOUTH EVERY DAY (Patient not taking: Reported on 2025), Disp: 30 tablet, Rfl: 1    ALLERGIES:  Allergies   Allergen Reactions    Amoxicillin Itching    Lipitor [Atorvastatin]            REVIEW OF SYSTEMS:  Review of Systems   Constitutional:  Negative for chills and fever.   HENT:  Negative for drooling and sneezing.    Eyes:  Negative for redness.    Respiratory:  Negative for cough and wheezing.    Gastrointestinal:  Negative for nausea and vomiting.   Musculoskeletal:  Positive for arthralgias. Negative for joint swelling and myalgias.   Neurological:  Negative for weakness and numbness.   Psychiatric/Behavioral:  Negative for behavioral problems. The patient is not nervous/anxious.        VITALS:  There were no vitals filed for this visit.    LABS:      _____________________________________________________  PHYSICAL EXAMINATION:  General: well developed and well nourished, alert, oriented times 3, and appears comfortable  Psychiatric: Normal  HEENT: Normocephalic, Atraumatic Trachea Midline, No torticollis  Pulmonary: No audible wheezing or respiratory distress   Abdomen/GI: Non tender, non distended   Cardiovascular: No pitting edema, 2+ radial pulse   Skin: No masses, erythema, lacerations, fluctation, ulcerations  Neurovascular: Sensation Intact to the Median, Ulnar, Radial Nerve, Motor Intact to the Median, Ulnar, Radial Nerve, and Pulses Intact  Musculoskeletal: Normal, except as noted in detailed exam and in HPI.      MUSCULOSKELETAL EXAMINATION:  left CMC Exam:  No adduction contracture  No hyperextension deformity of MCP joint  Positive localized tenderness over radial and dorsal aspect of thumb (CMC joint)  Grind test is Positive for pain and Positive for crepitus  No triggering or tenderness over the A1 pulley  - pain with Finkelstein’s maneuver   TTP DIP joint bilateral index finger    ___________________________________________________  STUDIES REVIEWED:  I have personally reviewed and interpreted  AP lateral and oblique radiographs of bilateral hand  which demonstrate extensive  severe degenerative changes  of multiple small joints       LABS REVIEWED:    HgA1c:   Lab Results   Component Value Date    HGBA1C 5.6 06/14/2024     BMP:   Lab Results   Component Value Date    CALCIUM 8.6 06/14/2024    K 4.1 06/14/2024    CO2 30 06/14/2024      06/14/2024    BUN 16 06/14/2024    CREATININE 0.92 06/14/2024               PROCEDURES PERFORMED:  Small joint arthrocentesis: L thumb CMC  [place] Protocol:  procedure performed by consultantConsent: Verbal consent obtained.  Consent given by: patient  Patient identity confirmed: verbally with patient  Supporting Documentation  Indications: pain   Procedure Details  Location: thumb - L thumb CMC  Needle size: 25 G  Ultrasound guidance: no  Medications administered: 0.5 mL bupivacaine 0.25 %; 1 mL lidocaine 1 %; 20 mg triamcinolone acetonide 40 mg/mL    Patient tolerance: patient tolerated the procedure well with no immediate complications  Dressing:  Sterile dressing applied      Small joint arthrocentesis: L index DIP  [place] Protocol:  procedure performed by consultantConsent: Verbal consent obtained.  Consent given by: patient  Patient identity confirmed: verbally with patient  Supporting Documentation  Indications: pain   Procedure Details  Location: index finger - L index DIP  Needle size: 25 G  Ultrasound guidance: no  Medications administered: 0.5 mL bupivacaine 0.25 %; 1 mL lidocaine 1 %; 20 mg triamcinolone acetonide 40 mg/mL    Patient tolerance: patient tolerated the procedure well with no immediate complications  Dressing:  Sterile dressing applied        -    _____________________________________________________      Scribe Attestation      I,:  Marisa Holloway MA am acting as a scribe while in the presence of the attending physician.:       I,:  Michelle Nava MD personally performed the services described in this documentation    as scribed in my presence.:

## 2025-05-03 DIAGNOSIS — I25.10 ARTERIOSCLEROTIC HEART DISEASE: ICD-10-CM

## 2025-05-03 DIAGNOSIS — F33.0 MILD EPISODE OF RECURRENT MAJOR DEPRESSIVE DISORDER (HCC): ICD-10-CM

## 2025-05-03 DIAGNOSIS — I21.11 ST ELEVATION (STEMI) MYOCARDIAL INFARCTION INVOLVING RIGHT CORONARY ARTERY (HCC): ICD-10-CM

## 2025-05-05 RX ORDER — DESVENLAFAXINE 100 MG/1
100 TABLET, EXTENDED RELEASE ORAL DAILY
Qty: 100 TABLET | Refills: 0 | Status: SHIPPED | OUTPATIENT
Start: 2025-05-05

## 2025-05-05 RX ORDER — ROSUVASTATIN CALCIUM 20 MG/1
20 TABLET, COATED ORAL DAILY
Qty: 100 TABLET | Refills: 0 | Status: SHIPPED | OUTPATIENT
Start: 2025-05-05

## 2025-05-28 ENCOUNTER — HOSPITAL ENCOUNTER (OUTPATIENT)
Dept: NEUROLOGY | Facility: CLINIC | Age: 65
Discharge: HOME | End: 2025-05-28
Payer: COMMERCIAL

## 2025-05-28 DIAGNOSIS — M54.16 LUMBAR RADICULOPATHY: ICD-10-CM

## 2025-05-28 PROCEDURE — 95910 NRV CNDJ TEST 7-8 STUDIES: CPT | Performed by: PSYCHIATRY & NEUROLOGY

## 2025-05-28 PROCEDURE — 95886 MUSC TEST DONE W/N TEST COMP: CPT | Performed by: PSYCHIATRY & NEUROLOGY

## 2025-06-04 ENCOUNTER — OFFICE VISIT (OUTPATIENT)
Dept: VASCULAR SURGERY | Facility: CLINIC | Age: 65
End: 2025-06-04
Payer: COMMERCIAL

## 2025-06-04 VITALS
HEART RATE: 80 BPM | SYSTOLIC BLOOD PRESSURE: 138 MMHG | WEIGHT: 254 LBS | BODY MASS INDEX: 35.43 KG/M2 | DIASTOLIC BLOOD PRESSURE: 60 MMHG | OXYGEN SATURATION: 99 %

## 2025-06-04 DIAGNOSIS — I83.892 SYMPTOMATIC VARICOSE VEINS OF LEFT LOWER EXTREMITY: Primary | ICD-10-CM

## 2025-06-04 PROCEDURE — 99213 OFFICE O/P EST LOW 20 MIN: CPT | Performed by: NURSE PRACTITIONER

## 2025-06-04 NOTE — PROGRESS NOTES
"Name: Kee Bardales III      : 1960      MRN: [id number]  Encounter Provider: OLIVIA Bowser  Encounter Date: 2025   Encounter department: [facility] [place]  :  Assessment & Plan  Symptomatic varicose veins of left lower extremity  65-year-old male former smoker, with HLD, lumbar radiculopathy spinal stenosis, and LLE symptomatic varicose veins presents with complaints of aching, heaviness, numbness and weakness in left leg.    - Presents with complaints of LLE heaviness, aching, numbness and weakness.  Patient has known chronic low back pain and narrowing of L4/5, previously seen in our office by Dr. Ruiz in .  At that time he had superficial and deep venous incompetence without follow-up  -LLE +1 edema and venous stasis changes  -LLE dilated truncal varicosities  -No open wounds or ulcerations  - Patient is compliant with compression 3-4 times a week  - No RLE symptoms.      Plan:  -Multifactorial left lower extremity symptoms including venous insufficiency and MSK/neuropathic.  Advised numbness and weakness most likely not related to venous insufficiency.  -Conservative medical management with daily use of medical grade compression stockings 20-30 mmHg.  On a.m., off in p.m.  Frequent ambulation   Leg elevation at rest   Moisturizer and diligent skin care to maintain skin integrity and prevent skin breakdown  - Will get updated left venous duplex with reflux measurements and have patient return with vascular surgeon to review make further recommendations if indicated.    Orders:    Compression Stocking    VAS Lower extremity venous insufficiency duplex, Single leg w/ measurements; Future          History of Present Illness   Cc: \"Patient present today for Varicose vein. Patient c/o of bulging veins, left leg swelling, weakness in the left leg, and numbness in the left knee.Patient is a non-smoker. Patient wears compression socks 3 to 4 times a week for at least 8 hours.   " "\"  HPI  Kee Bardales III is a 65 y.o. male who presents for vascular follow-up with complaints of LLE symptomatic varicose veins with complaints of heaviness, aching, throbbing and dilated veins.  Patient also reports numbness and weakness in left leg with history of low back pain.    See above assessment and plan.      History obtained from: patient    Review of Systems   Constitutional: Negative.    HENT: Negative.     Eyes: Negative.    Respiratory: Negative.     Cardiovascular:  Positive for leg swelling.   Gastrointestinal: Negative.    Endocrine: Negative.    Genitourinary: Negative.    Musculoskeletal: Negative.    Skin: Negative.    Allergic/Immunologic: Negative.    Neurological: Negative.    Hematological: Negative.    Psychiatric/Behavioral: Negative.       Medical History Reviewed by provider this encounter:  Tobacco  Allergies  Meds  Problems  Med Hx  Surg Hx  Fam Hx     .  Past Medical History   Past Medical History[1]  Past Surgical History[2]  Family History[3]   reports that he quit smoking about 9 years ago. His smoking use included cigarettes. He started smoking about 24 years ago. He has a 7.6 pack-year smoking history. He has been exposed to tobacco smoke. He has never used smokeless tobacco. He reports current alcohol use. He reports current drug use. Frequency: 2.00 times per week. Drug: Marijuana.  Current Outpatient Medications   Medication Instructions    ALPRAZolam (XANAX) 0.25 mg, Oral, Daily at bedtime PRN    aspirin 81 mg, Daily    desvenlafaxine (PRISTIQ) 100 mg, Oral, Daily    gabapentin (NEURONTIN) 100 mg, Oral, Daily at bedtime    methocarbamol (ROBAXIN) 500 mg, Oral, 3 times daily    Multiple Vitamins-Minerals (MULTIVITAMIN ADULTS 50+ PO) Take by mouth    pantoprazole (PROTONIX) 40 mg, Oral, Daily    rosuvastatin (CRESTOR) 20 mg, Oral, Daily    tadalafil (CIALIS) 20 mg, Oral, Daily PRN   Allergies[4]   Medications Ordered Prior to Encounter[5]   Social History[6]   "   Objective   /60 (BP Location: Left arm, Patient Position: Sitting, Cuff Size: Standard)   Pulse 80   Wt 115 kg (254 lb)   SpO2 99%   BMI 35.43 kg/m²      Physical Exam  Vitals reviewed.   Constitutional:       General: He is not in acute distress.     Appearance: Normal appearance. He is obese.   HENT:      Head: Normocephalic and atraumatic.     Cardiovascular:      Rate and Rhythm: Normal rate.      Pulses: Normal pulses.           Dorsalis pedis pulses are 2+ on the right side and 2+ on the left side.   Pulmonary:      Effort: Pulmonary effort is normal. No respiratory distress.     Musculoskeletal:         General: Normal range of motion.      Right lower leg: No edema.      Left lower le+ Edema present.     Skin:     General: Skin is warm and dry.      Capillary Refill: Capillary refill takes less than 2 seconds.      Comments: LLE medial calf and knee dilated varicosities     Neurological:      Mental Status: He is alert and oriented to person, place, and time.      Sensory: No sensory deficit.      Motor: No weakness.     Psychiatric:         Behavior: Behavior normal.         LEFT  Administrative Statements   Discussed: Instructions for management, Patient and family education, Importance of tx compliance, Impressions, Documenting in the medical record, Reviewing/placing orders in the medical record (including tests, medications, and/or procedures), and Obtaining or reviewing history  .       [1]   Past Medical History:  Diagnosis Date    Coronary artery disease     Depression     Dermoid cyst of face     resolved 2016    Hyperlipidemia     Myocardial infarction (HCC) 04/15/2016    99% RCA block    Presenile dementia, depressed type (HCC)     onst 2009, resolved 2016    Psychiatric disorder     depression   [2]   Past Surgical History:  Procedure Laterality Date    APPENDECTOMY      COLONOSCOPY N/A 2016    Procedure: COLONOSCOPY;  Surgeon: Norberto Argueta MD;  Location:  [facility] GI LAB;  Service:     CORONARY ANGIOPLASTY WITH STENT PLACEMENT Right 04/15/2016    EPIDURAL BLOCK INJECTION N/A 03/31/2023    Procedure: L5 S1 LUMBAR epidural steroid injection ([id number]);  Surgeon: Saroj Rose DO;  Location: [facility] MAIN OR;  Service: Pain Management     ESOPHAGOGASTRODUODENOSCOPY N/A 12/14/2016    Procedure: ESOPHAGOGASTRODUODENOSCOPY (EGD);  Surgeon: Norberto Argueta MD;  Location: [facility] GI LAB;  Service:     LUMBAR EPIDURAL INJECTION N/A 06/14/2023    Procedure: L5 S1  LUMBAR epidural steroid injection ([id number]);  Surgeon: Saroj Rose DO;  Location: [facility] MAIN OR;  Service: Pain Management     UPPER GASTROINTESTINAL ENDOSCOPY     [3]   Family History  Problem Relation Name Age of Onset    Arthritis Mother      Hypertension Mother      Hypertension Father      Heart disease Father      Coronary artery disease Father      Depression Father      Hypertension Brother      Cancer Maternal Grandfather     [4]   Allergies  Allergen Reactions    Amoxicillin Itching    Lipitor [Atorvastatin]    [5]   Current Outpatient Medications on File Prior to Visit   Medication Sig Dispense Refill    ALPRAZolam (XANAX) 0.25 mg tablet Take 1 tablet (0.25 mg total) by mouth daily at bedtime as needed for anxiety for up to 20 doses 20 tablet 0    aspirin 81 mg chewable tablet Chew 81 mg in the morning.      desvenlafaxine (PRISTIQ) 100 mg 24 hr tablet Take 1 tablet (100 mg total) by mouth daily 100 tablet 0    gabapentin (Neurontin) 100 mg capsule Take 1 capsule (100 mg total) by mouth daily at bedtime 30 capsule 0    methocarbamol (ROBAXIN) 500 mg tablet Take 1 tablet (500 mg total) by mouth 3 (three) times a day 30 tablet 0    Multiple Vitamins-Minerals (MULTIVITAMIN ADULTS 50+ PO) Take by mouth      rosuvastatin (CRESTOR) 20 MG tablet Take 1 tablet (20 mg total) by mouth daily 100 tablet 0    tadalafil (CIALIS) 20 MG tablet Take 1 tablet (20 mg total) by mouth daily as needed for erectile  dysfunction 10 tablet 1    pantoprazole (PROTONIX) 40 mg tablet TAKE 1 TABLET BY MOUTH EVERY DAY (Patient not taking: Reported on 2025) 30 tablet 1     No current facility-administered medications on file prior to visit.   [6]   Social History  Tobacco Use    Smoking status: Former     Current packs/day: 0.00     Average packs/day: 0.5 packs/day for 15.3 years (7.6 ttl pk-yrs)     Types: Cigarettes     Start date:      Quit date: 4/15/2016     Years since quittin.1     Passive exposure: Past    Smokeless tobacco: Never   Vaping Use    Vaping status: Never Used   Substance and Sexual Activity    Alcohol use: Yes     Comment: 2-3 beers a week per pt 24    Drug use: Yes     Frequency: 2.0 times per week     Types: Marijuana     Comment: daily

## 2025-06-04 NOTE — PATIENT INSTRUCTIONS
Conservative medical management with daily use of medical grade compression stockings 20-30 mmHg.  On a.m., off in p.m.  Frequent ambulation   Leg elevation at rest   Moisturizer and diligent skin care to maintain skin integrity and prevent skin breakdown    Lower extremity venous duplex with reflux measurements- LEFT    Return to office after imaging with vascular surgeon to review and make recommendations as needed

## 2025-06-04 NOTE — ASSESSMENT & PLAN NOTE
65-year-old male former smoker, with HLD, lumbar radiculopathy spinal stenosis, and LLE symptomatic varicose veins presents with complaints of aching, heaviness, numbness and weakness in left leg.    - Presents with complaints of LLE heaviness, aching, numbness and weakness.  Patient has known chronic low back pain and narrowing of L4/5, previously seen in our office by Dr. Ruiz in 2023.  At that time he had superficial and deep venous incompetence without follow-up  -LLE +1 edema and venous stasis changes  -LLE dilated truncal varicosities  -No open wounds or ulcerations  - Patient is compliant with compression 3-4 times a week  - No RLE symptoms.      Plan:  -Multifactorial left lower extremity symptoms including venous insufficiency and MSK/neuropathic.  Advised numbness and weakness most likely not related to venous insufficiency.  -Conservative medical management with daily use of medical grade compression stockings 20-30 mmHg.  On a.m., off in p.m.  Frequent ambulation   Leg elevation at rest   Moisturizer and diligent skin care to maintain skin integrity and prevent skin breakdown  - Will get updated left venous duplex with reflux measurements and have patient return with vascular surgeon to review make further recommendations if indicated.    Orders:    Compression Stocking    VAS Lower extremity venous insufficiency duplex, Single leg w/ measurements; Future

## 2025-07-09 ENCOUNTER — OFFICE VISIT (OUTPATIENT)
Dept: OBGYN CLINIC | Facility: CLINIC | Age: 65
End: 2025-07-09
Payer: COMMERCIAL

## 2025-07-09 VITALS — WEIGHT: 254 LBS | BODY MASS INDEX: 35.56 KG/M2 | HEIGHT: 71 IN

## 2025-07-09 DIAGNOSIS — M15.1 DEGENERATIVE ARTHRITIS OF DISTAL INTERPHALANGEAL JOINT OF INDEX FINGER OF LEFT HAND: ICD-10-CM

## 2025-07-09 DIAGNOSIS — M18.12 ARTHRITIS OF CARPOMETACARPAL (CMC) JOINT OF LEFT THUMB: Primary | ICD-10-CM

## 2025-07-09 DIAGNOSIS — M19.041 ARTHRITIS OF BOTH HANDS: ICD-10-CM

## 2025-07-09 DIAGNOSIS — M19.042 ARTHRITIS OF BOTH HANDS: ICD-10-CM

## 2025-07-09 PROCEDURE — 20600 DRAIN/INJ JOINT/BURSA W/O US: CPT | Performed by: SURGERY

## 2025-07-09 PROCEDURE — 99214 OFFICE O/P EST MOD 30 MIN: CPT | Performed by: SURGERY

## 2025-07-09 RX ORDER — TRIAMCINOLONE ACETONIDE 40 MG/ML
20 INJECTION, SUSPENSION INTRA-ARTICULAR; INTRAMUSCULAR
Status: COMPLETED | OUTPATIENT
Start: 2025-07-09 | End: 2025-07-09

## 2025-07-09 RX ORDER — BUPIVACAINE HYDROCHLORIDE 2.5 MG/ML
0.5 INJECTION, SOLUTION INFILTRATION; PERINEURAL
Status: COMPLETED | OUTPATIENT
Start: 2025-07-09 | End: 2025-07-09

## 2025-07-09 RX ADMIN — TRIAMCINOLONE ACETONIDE 20 MG: 40 INJECTION, SUSPENSION INTRA-ARTICULAR; INTRAMUSCULAR at 08:00

## 2025-07-09 RX ADMIN — BUPIVACAINE HYDROCHLORIDE 0.5 ML: 2.5 INJECTION, SOLUTION INFILTRATION; PERINEURAL at 08:00

## 2025-07-09 NOTE — PROGRESS NOTES
Michelle Nava M.D.  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  Due to acute exacerbation of chronic osteoarthritis in these joints the decision was made to proceed with repeat injections to the left first CMC and index DIP   Injections were tolerated well and may be repeated every 3 months if needed  Continue adjunctive measures with heat, anti-inflammatories (oral and topical), and bracing as needed  Follow up in 3 months or afterward as needed for continued management     Orders:    Small joint arthrocentesis    Small joint arthrocentesis    Arthritis of both hands  Patient is beginning to get the same arthritic pains in the first CMC and index DIP on the right, but would like to hold off on injections for now as this only bothers him with certain activities                 The patient verbalized understanding of exam findings and treatment plan. We engaged in the shared decision-making process and treatment options were discussed at length with the patient. Surgical and conservative management discussed today along with risks and benefits.      Follow Up:  Return in about 3 months (around 10/9/2025), or if symptoms worsen or fail to improve.    To Do Next Visit:  Re-evaluation of current issue      General Discussions:  Osteoarthritis:  The anatomy and physiology of osteoarthritis was discussed with the patient today in the office.  Deterioration of the articular cartilage eventually leads to altered mobility at the joint, resulting in joint subluxation, osteophyte formation, cystic changes, as well as subchondral sclerosis.  Eventually, pain, limited mobility, and compensatory hypermobility at surrounding joints  may develop.  While normal activity and usage of the joint may provide a painful experience to the patient, this typically does not result in damage to the limb.  Treatment options include splints to decreased joint edema, pain, and inflammation.  Therapy exercises to strengthen the surrounding musculature may relieve pain, but do not alter the overall continued development of osteoarthritis.  Oral medications, topical medications, corticosteroid injections may decrease pain and increase overall function.  Eventually, some patients may require surgical intervention.       ____________________________________________________________________________________________________________________________________________      CHIEF COMPLAINT:  Chief Complaint   Patient presents with    Follow-up     Patient would like injections today last injections were 4/9/25        SUBJECTIVE:  Kee Bardales III is a 65 y.o. year old RHD male who presents for follow up evaluation of the bilateral hands. At last visit he underwent left index DIP and left thumb CMC injections which provided good relief. Today he notes pain is worst on the left in the same joints and is starting on the right side. He plays guitar, the left side is more bothersome      I have personally reviewed all the relevant PMH, PSH, SH, FH, Medications and allergies      PAST MEDICAL HISTORY:  Past Medical History[1]    PAST SURGICAL HISTORY:  Past Surgical History[2]    FAMILY HISTORY:  Family History[3]    SOCIAL HISTORY:  Social History[4]    MEDICATIONS:  Current Medications[5]    ALLERGIES:  Allergies[6]        REVIEW OF SYSTEMS:  Review of Systems   Constitutional:  Negative for chills and fever.   HENT:  Negative for ear pain and sore throat.    Eyes:  Negative for pain and visual disturbance.   Respiratory:  Negative for cough and shortness of breath.    Cardiovascular:  Negative for chest pain and palpitations.   Gastrointestinal:  Negative for abdominal pain  and vomiting.   Genitourinary:  Negative for dysuria and hematuria.   Musculoskeletal:  Positive for arthralgias. Negative for back pain.   Skin:  Negative for color change and rash.   Neurological:  Negative for seizures and syncope.   All other systems reviewed and are negative.      VITALS:  There were no vitals filed for this visit.    LABS:      _____________________________________________________  PHYSICAL EXAMINATION:  General: well developed and well nourished, alert, oriented times 3, and appears comfortable  Psychiatric: Normal  HEENT: Normocephalic, Atraumatic Trachea Midline, No torticollis  Pulmonary: No audible wheezing or respiratory distress   Abdomen/GI: Non tender, non distended   Cardiovascular: No pitting edema, 2+ radial pulse   Skin: No masses, erythema, lacerations, fluctation, ulcerations  Neurovascular: Sensation Intact to the Median, Ulnar, Radial Nerve, Motor Intact to the Median, Ulnar, Radial Nerve, and Pulses Intact  Musculoskeletal: Normal, except as noted in detailed exam and in HPI.      MUSCULOSKELETAL EXAMINATION:  left CMC Exam:  No adduction contracture  No hyperextension deformity of MCP joint  Positive localized tenderness over radial and dorsal aspect of thumb (CMC joint)  Grind test is Positive for pain and Negative for crepitus  Metacarpal load shift test positive   No triggering or tenderness over the A1 pulley  No pain with Finkelstein’s maneuver     Left index finger:   Arthritic nodules at the DIP with associated pain   Digital ROM intact      ___________________________________________________  STUDIES REVIEWED:  I have personally reviewed and interpreted  AP lateral and oblique radiographs of the left hand which demonstrates severe arthritic changes at the first CMC and index DIP joints     LABS REVIEWED:    HgA1c:   Lab Results   Component Value Date    HGBA1C 5.6 06/14/2024     BMP:   Lab Results   Component Value Date    CALCIUM 8.6 06/14/2024    K 4.1 06/14/2024  "   CO2 30 06/14/2024     06/14/2024    BUN 16 06/14/2024    CREATININE 0.92 06/14/2024               PROCEDURES PERFORMED:  Small joint arthrocentesis: L thumb CMC    Performed by: Michelle Nava MD  Authorized by: Michelle Nava MD    Sasser Protocol:  procedure performed by consultantConsent: Verbal consent obtained  Risks and benefits: risks, benefits and alternatives were discussed  Consent given by: patient  Time out: Immediately prior to procedure a \"time out\" was called to verify the correct patient, procedure, equipment, support staff and site/side marked as required.  Patient understanding: patient states understanding of the procedure being performed  Site marked: the operative site was marked  Required items: required blood products, implants, devices, and special equipment available  Patient identity confirmed: verbally with patient  Supporting Documentation  Indications: pain   Procedure Details  Location: thumb - L thumb CMC  Needle size: 25 G  Ultrasound guidance: no  Approach: dorsal  Medications administered: 20 mg triamcinolone acetonide 40 mg/mL; 0.5 mL bupivacaine 0.25 %    Patient tolerance: patient tolerated the procedure well with no immediate complications  Dressing:  Sterile dressing applied      Small joint arthrocentesis: L index DIP    Performed by: Michelle Nava MD  Authorized by: Michelle Nava MD    Sasser Protocol:  procedure performed by consultantConsent: Verbal consent obtained  Risks and benefits: risks, benefits and alternatives were discussed  Consent given by: patient  Time out: Immediately prior to procedure a \"time out\" was called to verify the correct patient, procedure, equipment, support staff and site/side marked as required.  Patient understanding: patient states understanding of the procedure being performed  Site marked: the operative site was marked  Required items: required blood products, implants, devices, and special equipment " available  Patient identity confirmed: verbally with patient  Supporting Documentation  Indications: pain   Procedure Details  Location: index finger - L index DIP  Needle size: 25 G  Ultrasound guidance: no  Approach: dorsal  Medications administered: 20 mg triamcinolone acetonide 40 mg/mL; 0.5 mL bupivacaine 0.25 %    Patient tolerance: patient tolerated the procedure well with no immediate complications  Dressing:  Sterile dressing applied            _____________________________________________________      Scribe Attestation      I,:  Tabby Perez PA-C am acting as a scribe while in the presence of the attending physician.:       I,:  Michelle Nava MD personally performed the services described in this documentation    as scribed in my presence.:                          [1]   Past Medical History:  Diagnosis Date    Coronary artery disease     Depression     Dermoid cyst of face     resolved 04/19/2016    Hyperlipidemia     Myocardial infarction (HCC) 04/15/2016    99% RCA block    Presenile dementia, depressed type (HCC)     onst 11/03/2009, resolved 07/21/2016    Psychiatric disorder     depression   [2]   Past Surgical History:  Procedure Laterality Date    APPENDECTOMY      COLONOSCOPY N/A 12/14/2016    Procedure: COLONOSCOPY;  Surgeon: Norberto Argueta MD;  Location: [facility] GI LAB;  Service:     CORONARY ANGIOPLASTY WITH STENT PLACEMENT Right 04/15/2016    EPIDURAL BLOCK INJECTION N/A 03/31/2023    Procedure: L5 S1 LUMBAR epidural steroid injection (57129);  Surgeon: Saroj Rose DO;  Location: [facility] MAIN OR;  Service: Pain Management     ESOPHAGOGASTRODUODENOSCOPY N/A 12/14/2016    Procedure: ESOPHAGOGASTRODUODENOSCOPY (EGD);  Surgeon: Norberto Argueta MD;  Location: [facility] GI LAB;  Service:     LUMBAR EPIDURAL INJECTION N/A 06/14/2023    Procedure: L5 S1  LUMBAR epidural steroid injection ([id number]);  Surgeon: Saroj Rose DO;  Location: [facility] MAIN OR;  Service: Pain Management     UPPER  GASTROINTESTINAL ENDOSCOPY     [3]   Family History  Problem Relation Name Age of Onset    Arthritis Mother      Hypertension Mother      Hypertension Father      Heart disease Father      Coronary artery disease Father      Depression Father      Hypertension Brother      Cancer Maternal Grandfather     [4]   Social History  Tobacco Use    Smoking status: Former     Current packs/day: 0.00     Average packs/day: 0.5 packs/day for 15.3 years (7.6 ttl pk-yrs)     Types: Cigarettes     Start date:      Quit date: 4/15/2016     Years since quittin.2     Passive exposure: Past    Smokeless tobacco: Never   Vaping Use    Vaping status: Never Used   Substance Use Topics    Alcohol use: Yes     Comment: 2-3 beers a week per pt 24    Drug use: Yes     Frequency: 2.0 times per week     Types: Marijuana     Comment: daily    [5]   Current Outpatient Medications:     ALPRAZolam (XANAX) 0.25 mg tablet, Take 1 tablet (0.25 mg total) by mouth daily at bedtime as needed for anxiety for up to 20 doses, Disp: 20 tablet, Rfl: 0    aspirin 81 mg chewable tablet, Chew 81 mg in the morning., Disp: , Rfl:     desvenlafaxine (PRISTIQ) 100 mg 24 hr tablet, Take 1 tablet (100 mg total) by mouth daily, Disp: 100 tablet, Rfl: 0    gabapentin (Neurontin) 100 mg capsule, Take 1 capsule (100 mg total) by mouth daily at bedtime, Disp: 30 capsule, Rfl: 0    methocarbamol (ROBAXIN) 500 mg tablet, Take 1 tablet (500 mg total) by mouth 3 (three) times a day, Disp: 30 tablet, Rfl: 0    Multiple Vitamins-Minerals (MULTIVITAMIN ADULTS 50+ PO), Take by mouth, Disp: , Rfl:     rosuvastatin (CRESTOR) 20 MG tablet, Take 1 tablet (20 mg total) by mouth daily, Disp: 100 tablet, Rfl: 0    tadalafil (CIALIS) 20 MG tablet, Take 1 tablet (20 mg total) by mouth daily as needed for erectile dysfunction, Disp: 10 tablet, Rfl: 1    pantoprazole (PROTONIX) 40 mg tablet, TAKE 1 TABLET BY MOUTH EVERY DAY (Patient not taking: Reported on 2025), Disp: 30  tablet, Rfl: 1  [6]   Allergies  Allergen Reactions    Amoxicillin Itching    Lipitor [Atorvastatin]

## 2025-07-21 ENCOUNTER — OFFICE VISIT (OUTPATIENT)
Dept: FAMILY MEDICINE CLINIC | Facility: CLINIC | Age: 65
End: 2025-07-21
Payer: COMMERCIAL

## 2025-07-21 VITALS
DIASTOLIC BLOOD PRESSURE: 70 MMHG | BODY MASS INDEX: 35.42 KG/M2 | WEIGHT: 253 LBS | OXYGEN SATURATION: 98 % | TEMPERATURE: 97.3 F | HEART RATE: 70 BPM | RESPIRATION RATE: 16 BRPM | SYSTOLIC BLOOD PRESSURE: 138 MMHG | HEIGHT: 71 IN

## 2025-07-21 DIAGNOSIS — R73.09 ABNORMAL GLUCOSE: ICD-10-CM

## 2025-07-21 DIAGNOSIS — E78.2 MIXED HYPERLIPIDEMIA: ICD-10-CM

## 2025-07-21 DIAGNOSIS — F32.2 MODERATELY SEVERE MAJOR DEPRESSION (HCC): ICD-10-CM

## 2025-07-21 DIAGNOSIS — Z13.6 SCREENING FOR CARDIOVASCULAR CONDITION: ICD-10-CM

## 2025-07-21 DIAGNOSIS — F41.9 ANXIETY: ICD-10-CM

## 2025-07-21 DIAGNOSIS — I21.11 ST ELEVATION (STEMI) MYOCARDIAL INFARCTION INVOLVING RIGHT CORONARY ARTERY (HCC): ICD-10-CM

## 2025-07-21 DIAGNOSIS — E66.01 SEVERE OBESITY (BMI 35.0-39.9) WITH COMORBIDITY (HCC): ICD-10-CM

## 2025-07-21 DIAGNOSIS — Z00.00 WELL ADULT EXAM: ICD-10-CM

## 2025-07-21 DIAGNOSIS — Z12.5 SCREENING FOR PROSTATE CANCER: ICD-10-CM

## 2025-07-21 DIAGNOSIS — L72.9 INFECTED CYST OF SKIN: Primary | ICD-10-CM

## 2025-07-21 DIAGNOSIS — R39.9 LOWER URINARY TRACT SYMPTOMS (LUTS): ICD-10-CM

## 2025-07-21 DIAGNOSIS — L08.9 INFECTED CYST OF SKIN: Primary | ICD-10-CM

## 2025-07-21 DIAGNOSIS — F33.0 MILD EPISODE OF RECURRENT MAJOR DEPRESSIVE DISORDER (HCC): ICD-10-CM

## 2025-07-21 DIAGNOSIS — Z00.00 ANNUAL PHYSICAL EXAM: ICD-10-CM

## 2025-07-21 DIAGNOSIS — I25.10 ARTERIOSCLEROTIC HEART DISEASE: ICD-10-CM

## 2025-07-21 PROCEDURE — 99214 OFFICE O/P EST MOD 30 MIN: CPT | Performed by: FAMILY MEDICINE

## 2025-07-21 PROCEDURE — 99396 PREV VISIT EST AGE 40-64: CPT | Performed by: FAMILY MEDICINE

## 2025-07-21 RX ORDER — ALPRAZOLAM 0.25 MG
0.25 TABLET ORAL
Qty: 20 TABLET | Refills: 0 | Status: SHIPPED | OUTPATIENT
Start: 2025-07-21

## 2025-07-21 RX ORDER — TIRZEPATIDE 2.5 MG/.5ML
2.5 INJECTION, SOLUTION SUBCUTANEOUS WEEKLY
Qty: 2 ML | Refills: 0 | Status: SHIPPED | OUTPATIENT
Start: 2025-07-21 | End: 2025-08-18

## 2025-07-21 RX ORDER — SULFAMETHOXAZOLE AND TRIMETHOPRIM 800; 160 MG/1; MG/1
1 TABLET ORAL EVERY 12 HOURS SCHEDULED
Qty: 20 TABLET | Refills: 0 | Status: SHIPPED | OUTPATIENT
Start: 2025-07-21 | End: 2025-07-31

## 2025-07-21 RX ORDER — TAMSULOSIN HYDROCHLORIDE 0.4 MG/1
0.4 CAPSULE ORAL
Qty: 90 CAPSULE | Refills: 1 | Status: SHIPPED | OUTPATIENT
Start: 2025-07-21

## 2025-07-21 RX ORDER — ROSUVASTATIN CALCIUM 20 MG/1
20 TABLET, COATED ORAL DAILY
Qty: 100 TABLET | Refills: 1 | Status: SHIPPED | OUTPATIENT
Start: 2025-07-21

## 2025-07-21 NOTE — PATIENT INSTRUCTIONS
"Patient Education     Routine physical for adults   The Basics   Written by the doctors and editors at [facility]   What is a physical? -- A physical is a routine visit, or \"check-up,\" with your doctor. You might also hear it called a \"wellness visit\" or \"preventive visit.\"  During each visit, the doctor will:   Ask about your physical and mental health   Ask about your habits, behaviors, and lifestyle   Do an exam   Give you vaccines if needed   Talk to you about any medicines you take   Give advice about your health   Answer your questions  Getting regular check-ups is an important part of taking care of your health. It can help your doctor find and treat any problems you have. But it's also important for preventing health problems.  A routine physical is different from a \"sick visit.\" A sick visit is when you see a doctor because of a health concern or problem. Since physicals are scheduled ahead of time, you can think about what you want to ask the doctor.  How often should I get a physical? -- It depends on your age and health. In general, for people age 21 years and older:   If you are younger than 50 years, you might be able to get a physical every 3 years.   If you are 50 years or older, your doctor might recommend a physical every year.  If you have an ongoing health condition, like diabetes or high blood pressure, your doctor will probably want to see you more often.  What happens during a physical? -- In general, each visit will include:   Physical exam - The doctor or nurse will check your height, weight, heart rate, and blood pressure. They will also look at your eyes and ears. They will ask about how you are feeling and whether you have any symptoms that bother you.   Medicines - It's a good idea to bring a list of all the medicines you take to each doctor visit. Your doctor will talk to you about your medicines and answer any questions. Tell them if you are having any side effects that bother you. You " "should also tell them if you are having trouble paying for any of your medicines.   Habits and behaviors - This includes:   Your diet   Your exercise habits   Whether you smoke, drink alcohol, or use drugs   Whether you are sexually active   Whether you feel safe at home  Your doctor will talk to you about things you can do to improve your health and lower your risk of health problems. They will also offer help and support. For example, if you want to quit smoking, they can give you advice and might prescribe medicines. If you want to improve your diet or get more physical activity, they can help you with this, too.   Lab tests, if needed - The tests you get will depend on your age and situation. For example, your doctor might want to check your:   Cholesterol   Blood sugar   Iron level   Vaccines - The recommended vaccines will depend on your age, health, and what vaccines you already had. Vaccines are very important because they can prevent certain serious or deadly infections.   Discussion of screening - \"Screening\" means checking for diseases or other health problems before they cause symptoms. Your doctor can recommend screening based on your age, risk, and preferences. This might include tests to check for:   Cancer, such as breast, prostate, cervical, ovarian, colorectal, prostate, lung, or skin cancer   Sexually transmitted infections, such as chlamydia and gonorrhea   Mental health conditions like depression and anxiety  Your doctor will talk to you about the different types of screening tests. They can help you decide which screenings to have. They can also explain what the results might mean.   Answering questions - The physical is a good time to ask the doctor or nurse questions about your health. If needed, they can refer you to other doctors or specialists, too.  Adults older than 65 years often need other care, too. As you get older, your doctor will talk to you about:   How to prevent falling at " home   Hearing or vision tests   Memory testing   How to take your medicines safely   Making sure that you have the help and support you need at home  All topics are updated as new evidence becomes available and our peer review process is complete.  This topic retrieved from [employer] on: May 02, 2024.  Topic 108331 Version 1.0  Release: 32.4.3 - C32.122  © 2024 UpToDate, Inc. and/or its affiliates. All rights reserved.  Consumer Information Use and Disclaimer   Disclaimer: This generalized information is a limited summary of diagnosis, treatment, and/or medication information. It is not meant to be comprehensive and should be used as a tool to help the user understand and/or assess potential diagnostic and treatment options. It does NOT include all information about conditions, treatments, medications, side effects, or risks that may apply to a specific patient. It is not intended to be medical advice or a substitute for the medical advice, diagnosis, or treatment of a health care provider based on the health care provider's examination and assessment of a patient's specific and unique circumstances. Patients must speak with a health care provider for complete information about their health, medical questions, and treatment options, including any risks or benefits regarding use of medications. This information does not endorse any treatments or medications as safe, effective, or approved for treating a specific patient. UpToDate, Inc. and its affiliates disclaim any warranty or liability relating to this information or the use thereof.The use of this information is governed by the Terms of Use, available at https://www.woltersImmunexpressuwer.com/en/know/clinical-effectiveness-terms. 2024© UpToDate, Inc. and its affiliates and/or licensors. All rights reserved.  Copyright   © 2024 UpToDate, Inc. and/or its affiliates. All rights reserved.    Patient Education     Depression in adults   The Basics   Written by the doctors and  "editors at [employer]   What is depression? -- Depression is a disorder that makes you sad, but it is different from normal sadness. Depression can make it hard for you to work, study, or do everyday tasks.  What causes depression? -- Depression is caused by problems with chemicals in the brain called \"neurotransmitters.\" Some people might be more likely to have depression if it runs in their family. Other things might also play a role, including hormones, certain health problems, medicines, stress, being mistreated as a child, family problems, and problems with friends or at school or work.  How do I know if I am depressed? -- People with depression feel down most of the time for at least 2 weeks. They also have at least 1 of these 2 symptoms:   They no longer enjoy or care about doing the things that they used to like to do.   They feel sad, down, hopeless, or cranky most of the day, almost every day.  People with depression can also have other symptoms. Examples include:   Changes in your appetite or weight. You might eat too little or too much, or gain or lose weight without trying.   Sleeping too much or too little   Feeling tired or like you have no energy   Feeling guilty, helpless, or like you are worth nothing   Trouble with concentration or memory   Acting restless or have trouble staying still, or moving or speaking more slowly than normal   Repeated thoughts of death or killing yourself  If you think that you might be depressed, see your doctor or nurse. Only someone trained in mental health can tell for sure if you are depressed.  How is depression diagnosed? -- Your doctor or nurse will do a physical exam, ask you questions, and might order tests. Depression can have a big impact on your life. Luckily, depression can be treated, and the sooner treatment is started, the better it works.  Get help right away if you are thinking of hurting or killing yourself! -- If you ever feel like you might hurt " "yourself or someone else, help is available:   In the [place], contact the [employer] [facility] Crisis Lifeline:   To speak to someone, call or text [employer].   To talk to someone online, go to www.[employer].org/chat.   Call your doctor or nurse, and tell them it is urgent.   Call for an ambulance (in the [place] and [place], call 9-1-1).   Go to the emergency department at the nearest hospital.  What are the treatments for depression? -- Your doctor or nurse will work with you to make a treatment plan. Treatment can include:   Helping you learn more about depression   Counseling (with a psychiatrist, psychologist, nurse, or )   Medicines that relieve depression   Creating a plan to limit access to items that you might use to harm yourself   Other treatments that pass magnetic waves or electricity into the brain  In addition to treatment, getting regular physical activity can also help you feel better.  People with depression that is not too severe can get better by taking medicines or talking with a counselor. People with severe depression usually need medicines to get better, and might also need to see a counselor.  Another treatment involves placing a device against the scalp to pass magnetic waves into the brain. This is called \"transcranial magnetic stimulation\" (\"TMS\"). Doctors might suggest TMS if medicines and counseling have not helped.  Some people with severe depression might need a treatment called \"electroconvulsive therapy\" (\"ECT\"). During ECT, doctors pass an electric current through a person's brain in a safe way.  When will I feel better? -- Most treatment options take a little while to start working.   Many people who take medicines start to feel better within 2 weeks, but it might be 4 to 8 weeks before the medicine has its full effect.   Many people who see a counselor start to feel better within a few weeks, but it might take 8 to 10 weeks to get the greatest benefit.  If the first treatment you try " does not help you, tell your doctor or nurse, but do not give up. Some people need to try different treatments or combinations of treatments before they find an approach that works. Your doctor, nurse, or counselor can work with you to find the treatment that is right for you. They can also help you figure out how to cope while you search for the right treatment or are waiting for your treatment to start working.  How do I decide which treatment to have? -- You and your doctor or nurse will need to work together to choose a treatment for you. Medicines might work a little faster than counseling. But medicines can also cause side effects. Plus, some people do not like the idea of taking medicine.  Seeing a counselor involves talking about your feelings. That is also hard for some people.  What if I take medicine for depression and I want to have a baby? -- Some depression medicines can cause problems for an unborn baby. But having untreated depression during pregnancy can also cause problems. If you want to get pregnant, tell your doctor but do not stop taking your medicines. Together, you can plan the safest way for you to have your baby.  It's also important to talk with your doctor if you want to breastfeed after your baby is born. Breastfeeding has lots of benefits for both mother and baby. Some depression medicines are safer than others to use while breastfeeding. But having untreated depression after giving birth can also cause problems, so do not stop taking your medicines. Your doctor can work with you to plan the safest way for you to feed your baby.  All topics are updated as new evidence becomes available and our peer review process is complete.  This topic retrieved from [employer] on: Mar 06, 2024.  Topic 81177 Version 22.0  Release: 32.2.4 - C32.64  © 2024 UpToDate, Inc. and/or its affiliates. All rights reserved.  figure 1: Mood disorders caused by problems in the brain     Mooddisorders, such as  "depression and bipolar disorder, are caused by problems with\"neurotransmitters.\" These are chemicals in the brain that can affect your emotions.Treatments for mood disorders seem to work by changing the levels of certainneurotransmitters.  Graphic 76926 Version 4.0  Consumer Information Use and Disclaimer   Disclaimer: This generalized information is a limited summary of diagnosis, treatment, and/or medication information. It is not meant to be comprehensive and should be used as a tool to help the user understand and/or assess potential diagnostic and treatment options. It does NOT include all information about conditions, treatments, medications, side effects, or risks that may apply to a specific patient. It is not intended to be medical advice or a substitute for the medical advice, diagnosis, or treatment of a health care provider based on the health care provider's examination and assessment of a patient's specific and unique circumstances. Patients must speak with a health care provider for complete information about their health, medical questions, and treatment options, including any risks or benefits regarding use of medications. This information does not endorse any treatments or medications as safe, effective, or approved for treating a specific patient. UpToDate, Inc. and its affiliates disclaim any warranty or liability relating to this information or the use thereof.The use of this information is governed by the Terms of Use, available at https://www.woltersClose.iouwer.com/en/know/clinical-effectiveness-terms. 2024© UpToDate, Inc. and its affiliates and/or licensors. All rights reserved.  Copyright   © 2024 UpToDate, Inc. and/or its affiliates. All rights reserved.    "

## 2025-07-21 NOTE — ASSESSMENT & PLAN NOTE
Orders:  •  Ambulatory referral to Psych Services; Future  •  Vortioxetine HBr (TRINTELLIX) 20 MG tablet; Take 1 tablet (20 mg total) by mouth daily  •  ALPRAZolam (XANAX) 0.25 mg tablet; Take 1 tablet (0.25 mg total) by mouth daily at bedtime as needed for anxiety for up to 20 doses

## 2025-07-21 NOTE — PROGRESS NOTES
Adult Annual Physical  Name: Kee Bardales III      : 1960      MRN: [id number]  Encounter Provider: Frank Lombardi, DO  Encounter Date: 2025   Encounter department: [facility]    :  Assessment & Plan  Screening for prostate cancer    Orders:  •  PSA, Total Screen; Future    Screening for cardiovascular condition    Orders:  •  CBC; Future  •  Comprehensive metabolic panel; Future  •  Lipid Panel with Direct LDL reflex; Future    Well adult exam         Abnormal glucose         Mixed hyperlipidemia         Annual physical exam         Mild episode of recurrent major depressive disorder (HCC)      Orders:  •  Ambulatory referral to Psych Services; Future  •  Vortioxetine HBr (TRINTELLIX) 20 MG tablet; Take 1 tablet (20 mg total) by mouth daily    Anxiety    Orders:  •  Ambulatory referral to Psych Services; Future  •  Vortioxetine HBr (TRINTELLIX) 20 MG tablet; Take 1 tablet (20 mg total) by mouth daily  •  ALPRAZolam (XANAX) 0.25 mg tablet; Take 1 tablet (0.25 mg total) by mouth daily at bedtime as needed for anxiety for up to 20 doses    ST elevation (STEMI) myocardial infarction involving right coronary artery (HCC)    Orders:  •  rosuvastatin (CRESTOR) 20 MG tablet; Take 1 tablet (20 mg total) by mouth daily    Arteriosclerotic heart disease    Orders:  •  rosuvastatin (CRESTOR) 20 MG tablet; Take 1 tablet (20 mg total) by mouth daily    Severe obesity (BMI 35.0-39.9) with comorbidity (HCC)      Orders:  •  tirzepatide (Zepbound) 2.5 mg/0.5 mL auto-injector; Inject 0.5 mL (2.5 mg total) under the skin once a week for 28 days    Lower urinary tract symptoms (LUTS)    Orders:  •  tamsulosin (FLOMAX) 0.4 mg; Take 1 capsule (0.4 mg total) by mouth daily with dinner    Infected cyst of skin    Orders:  •  sulfamethoxazole-trimethoprim (BACTRIM DS) 800-160 mg per tablet; Take 1 tablet by mouth every 12 (twelve) hours for 10 days    Moderately severe major depression (HCC)        "        Preventive Screenings:    - Prostate cancer screening: screening up-to-date     Immunizations:  - Immunizations due: Prevnar 20, Tdap and Zoster (Shingrix)         History of Present Illness     Adult Annual Physical:  Patient presents for annual physical. Pt is sched for a full physical  No labs    In addition to his physical pt has a lit of thngs to discuss    Pt states he would like a refill of his p[restique - pt does nort feel the med is working feels neggative  Pt needs refill of crestor - states he needs arefill of xanax    Pt asking about flomax - pt states he will get up 4-5 times at night    Pt states he also has a cyst on his shoulder - states he draimned it states he startted pressing on it and now he feels its infected    Pt states he would like to go on wegovy    Pt denies pancreatic or throid cancer issues - personally or in family.     Diet and Physical Activity:  - Diet/Nutrition: no special diet and consuming 3-5 servings of fruits/vegetables daily.  - Exercise: moderate cardiovascular exercise, 5-7 times a week on average and vigorous cardiovascular exercise.    Depression Screening:    - PHQ-9 Score: 1    General Health:  - Sleep: sleeps well and 7-8 hours of sleep on average.  - Hearing: normal hearing bilateral ears.  - Vision: no vision problems, wears glasses and most recent eye exam > 1 year ago.  - Dental: regular dental visits, brushes teeth twice daily and floss regularly.     Health:  - History of STDs: no.   - Urinary symptoms: urinary frequency and urinary urgency.     Advanced Care Planning:  - Has an advanced directive?: yes    - Has a durable medical POA?: yes      Review of Systems   Skin:         Red swollen red area lerft upper asrm   Psychiatric/Behavioral:  Positive for dysphoric mood. The patient is nervous/anxious.          Objective   /70   Pulse 70   Temp (!) 97.3 °F (36.3 °C)   Resp 16   Ht 5' 11\" (1.803 m)   Wt 115 kg (253 lb)   SpO2 98%   BMI 35.29 " kg/m²           Physical Exam  Vitals and nursing note reviewed.   Constitutional:       General: He is not in acute distress.     Appearance: He is well-developed. He is obese. He is not diaphoretic.   HENT:      Head: Normocephalic and atraumatic.      Right Ear: External ear normal.      Left Ear: External ear normal.      Nose: Nose normal.      Mouth/Throat:      Pharynx: No oropharyngeal exudate.     Eyes:      General: No scleral icterus.        Right eye: No discharge.         Left eye: No discharge.      Pupils: Pupils are equal, round, and reactive to light.     Neck:      Thyroid: No thyromegaly.     Cardiovascular:      Rate and Rhythm: Normal rate.      Heart sounds: Normal heart sounds. No murmur heard.  Pulmonary:      Effort: Pulmonary effort is normal. No respiratory distress.      Breath sounds: Normal breath sounds. No wheezing.   Abdominal:      General: Bowel sounds are normal. There is no distension.      Palpations: Abdomen is soft. There is no mass.      Tenderness: There is no abdominal tenderness. There is no guarding or rebound.   Genitourinary:     Prostate: Normal.     Musculoskeletal:         General: Normal range of motion.     Skin:     General: Skin is warm and dry.      Findings: No erythema or rash.      Comments: Red indurated warm area behind left arm  No drainage currently     Neurological:      Mental Status: He is alert.      Coordination: Coordination normal.      Deep Tendon Reflexes: Reflexes normal.     Psychiatric:         Behavior: Behavior normal.

## 2025-07-21 NOTE — ASSESSMENT & PLAN NOTE
Orders:  •  Ambulatory referral to Psych Services; Future  •  Vortioxetine HBr (TRINTELLIX) 20 MG tablet; Take 1 tablet (20 mg total) by mouth daily

## 2025-07-22 ENCOUNTER — TELEPHONE (OUTPATIENT)
Age: 65
End: 2025-07-22

## 2025-07-22 NOTE — TELEPHONE ENCOUNTER
PA for tirzepatide (Zepbound) 2.5 mg/0.5 mL auto-injector SUBMITTED to [facility]    via    []CMM-KEY:   [x]Surescripts-Case ID # [id number]   []Availity-Auth ID #NDC #   []Faxed to plan   []Other website   []Phone call Case ID #     [x]PA sent as URGENT    All office notes, labs and other pertaining documents and studies sent. Clinical questions answered. Awaiting determination from insurance company.     Turnaround time for your insurance to make a decision on your Prior Authorization can take 7-21 business days.

## 2025-07-25 NOTE — TELEPHONE ENCOUNTER
PA for     tirzepatide (Zepbound) 2.5 mg/0.5 mL auto-injector   APPROVED     Date(s) approved 7/22/2025 - 7/22/2026    Case # [id number]     Patient advised by          []Icarushart Message  []Phone call   [x]LMOM  []L/M to call office as no active Communication consent on file  []Unable to leave detailed message as VM not approved on Communication consent       Pharmacy advised by    [x]Fax  []Phone call  []Secure Chat    Specialty Pharmacy    []      Approval letter scanned into Media No no letter available at time of approval.

## 2025-08-04 ENCOUNTER — HOSPITAL ENCOUNTER (OUTPATIENT)
Dept: RADIOLOGY | Facility: HOSPITAL | Age: 65
Discharge: HOME | End: 2025-08-04
Attending: NURSE PRACTITIONER
Payer: COMMERCIAL

## 2025-08-04 DIAGNOSIS — I83.892 SYMPTOMATIC VARICOSE VEINS OF LEFT LOWER EXTREMITY: ICD-10-CM

## 2025-08-04 PROCEDURE — 93971 EXTREMITY STUDY: CPT

## 2025-08-04 PROCEDURE — 93971 EXTREMITY STUDY: CPT | Performed by: SURGERY

## 2025-08-05 ENCOUNTER — TELEPHONE (OUTPATIENT)
Age: 65
End: 2025-08-05

## 2025-08-06 ENCOUNTER — TELEPHONE (OUTPATIENT)
Age: 65
End: 2025-08-06

## 2025-08-11 ENCOUNTER — APPOINTMENT (OUTPATIENT)
Dept: LAB | Facility: HOSPITAL | Age: 65
End: 2025-08-11
Attending: FAMILY MEDICINE
Payer: COMMERCIAL

## 2025-08-11 ENCOUNTER — APPOINTMENT (OUTPATIENT)
Dept: LAB | Facility: HOSPITAL | Age: 65
End: 2025-08-11
Payer: COMMERCIAL

## 2025-08-11 ENCOUNTER — APPOINTMENT (OUTPATIENT)
Dept: LAB | Facility: HOSPITAL | Age: 65
End: 2025-08-11
Attending: PREVENTIVE MEDICINE
Payer: COMMERCIAL

## 2025-08-12 ENCOUNTER — OFFICE VISIT (OUTPATIENT)
Dept: CARDIOLOGY CLINIC | Facility: CLINIC | Age: 65
End: 2025-08-12
Payer: COMMERCIAL

## 2025-08-14 ENCOUNTER — TELEPHONE (OUTPATIENT)
Dept: VASCULAR SURGERY | Facility: CLINIC | Age: 65
End: 2025-08-14

## 2025-08-14 ENCOUNTER — OFFICE VISIT (OUTPATIENT)
Dept: VASCULAR SURGERY | Facility: CLINIC | Age: 65
End: 2025-08-14
Payer: COMMERCIAL

## 2025-08-19 ENCOUNTER — OFFICE VISIT (OUTPATIENT)
Dept: FAMILY MEDICINE CLINIC | Facility: CLINIC | Age: 65
End: 2025-08-19
Payer: COMMERCIAL

## 2025-08-19 VITALS
DIASTOLIC BLOOD PRESSURE: 72 MMHG | RESPIRATION RATE: 16 BRPM | HEART RATE: 63 BPM | BODY MASS INDEX: 34.83 KG/M2 | SYSTOLIC BLOOD PRESSURE: 140 MMHG | TEMPERATURE: 97.9 F | HEIGHT: 71 IN | WEIGHT: 248.8 LBS

## 2025-08-19 DIAGNOSIS — R73.03 PREDIABETES: ICD-10-CM

## 2025-08-19 DIAGNOSIS — F41.9 ANXIETY: ICD-10-CM

## 2025-08-19 DIAGNOSIS — F33.0 MILD EPISODE OF RECURRENT MAJOR DEPRESSIVE DISORDER (HCC): ICD-10-CM

## 2025-08-19 DIAGNOSIS — E66.01 SEVERE OBESITY (BMI 35.0-39.9) WITH COMORBIDITY (HCC): ICD-10-CM

## 2025-08-19 DIAGNOSIS — E78.2 MIXED HYPERLIPIDEMIA: Primary | ICD-10-CM

## 2025-08-19 PROCEDURE — 99214 OFFICE O/P EST MOD 30 MIN: CPT | Performed by: FAMILY MEDICINE

## 2025-08-19 RX ORDER — TIRZEPATIDE 7.5 MG/.5ML
7.5 INJECTION, SOLUTION SUBCUTANEOUS WEEKLY
Qty: 2 ML | Refills: 0 | Status: SHIPPED | OUTPATIENT
Start: 2025-09-19

## 2025-08-19 RX ORDER — TIRZEPATIDE 5 MG/.5ML
5 INJECTION, SOLUTION SUBCUTANEOUS WEEKLY
Qty: 2 ML | Refills: 0 | Status: SHIPPED | OUTPATIENT
Start: 2025-08-19

## (undated) DEVICE — SUT ETHILON 5-0 P-3 18 IN 698H

## (undated) DEVICE — TRAY PAIN SUPPORT

## (undated) DEVICE — SYRINGE EPI 8ML LUER SLIP LOSS OF RESISTANCE PLASTIC PERFIX

## (undated) DEVICE — NEEDLE 25G X 5/8

## (undated) DEVICE — ADHESIVE SKN CLSR HISTOACRYL FLEX 0.5ML LF

## (undated) DEVICE — GLOVE SRG BIOGEL 7.5

## (undated) DEVICE — RADIOLOGY STERILE LABELS: Brand: CENTURION

## (undated) DEVICE — SKIN MARKER DUAL TIP WITH RULER CAP, FLEXIBLE RULER AND LABELS: Brand: DEVON

## (undated) DEVICE — LABEL STERILE (RSC) (2-SENSOR CAINE 2-LIDOCAINE 2-HEPARIN)

## (undated) DEVICE — SUT CHROMIC 3-0 PS-2 27 1638H

## (undated) DEVICE — INTENDED FOR TISSUE SEPARATION, AND OTHER PROCEDURES THAT REQUIRE A SHARP SURGICAL BLADE TO PUNCTURE OR CUT.: Brand: BARD-PARKER ®  SAFETY SCALPED

## (undated) DEVICE — SMALL NEEDLE COUNTER NEST

## (undated) DEVICE — PLASTIC ADHESIVE BANDAGE: Brand: CURITY

## (undated) DEVICE — GLOVE SRG BIOGEL 8

## (undated) DEVICE — NEEDLE 18 G X 1 1/2

## (undated) DEVICE — SUT ETHILON 4-0 PS-2 18 IN 1667G

## (undated) DEVICE — TRAY EPIDURAL PERIFIX 20GA X 3.5IN TUOHY 8ML

## (undated) DEVICE — SYRINGE 10ML LL

## (undated) DEVICE — CHLORAPREP APPLICATOR TINTED 10.5ML ONE-STEP

## (undated) DEVICE — TOWEL SET X-RAY

## (undated) DEVICE — CAUTERY ACCUTEMP

## (undated) DEVICE — SYRINGE 5ML LL

## (undated) DEVICE — ROCKER SWITCH PENCIL BLADE ELECTRODE, HOLSTER: Brand: EDGE

## (undated) DEVICE — FLEXIBLE ADHESIVE BANDAGE,X-LARGE: Brand: CURITY

## (undated) DEVICE — IV SET EXT SM BORE CARESITE 8IN